# Patient Record
Sex: MALE | Race: ASIAN | URBAN - METROPOLITAN AREA
[De-identification: names, ages, dates, MRNs, and addresses within clinical notes are randomized per-mention and may not be internally consistent; named-entity substitution may affect disease eponyms.]

---

## 2022-09-09 ENCOUNTER — EMERGENCY (EMERGENCY)
Facility: HOSPITAL | Age: 42
LOS: 0 days | Discharge: HOME | End: 2022-09-09
Attending: STUDENT IN AN ORGANIZED HEALTH CARE EDUCATION/TRAINING PROGRAM

## 2022-09-09 VITALS
DIASTOLIC BLOOD PRESSURE: 86 MMHG | RESPIRATION RATE: 16 BRPM | SYSTOLIC BLOOD PRESSURE: 138 MMHG | HEART RATE: 76 BPM | OXYGEN SATURATION: 98 %

## 2022-09-09 VITALS
DIASTOLIC BLOOD PRESSURE: 106 MMHG | HEIGHT: 68 IN | TEMPERATURE: 98 F | HEART RATE: 85 BPM | OXYGEN SATURATION: 100 % | RESPIRATION RATE: 20 BRPM | SYSTOLIC BLOOD PRESSURE: 175 MMHG | WEIGHT: 210.1 LBS

## 2022-09-09 PROCEDURE — 99284 EMERGENCY DEPT VISIT MOD MDM: CPT

## 2022-09-09 NOTE — ED PROVIDER NOTE - OBJECTIVE STATEMENT
41 yo male w/ PMH of HTN, DM presents for overdose.  Snorted 3 g of ketamine, found by family member sitting on ground, no LOC.  Pt has no complaints. Denies trauma, SOB, chest pain, N/V. 43 yo male w/ PMH of HTN, DM presents for drug intoxication.  Snorted 3 g of ketamine, found by family member sitting on ground, no LOC.  Pt has no complaints. Denies trauma, SOB, chest pain, N/V.

## 2022-09-09 NOTE — ED ADULT TRIAGE NOTE - CHIEF COMPLAINT QUOTE
BIBA with complaints of overdose on Ketamine, snorted 3 gms, found by father awake but not really responding/moving. BIBA with complaints of overdose on Ketamine, snorted 3 gms bought from outside,  found by father awake but not really responding/moving. Denies SI

## 2022-09-09 NOTE — ED PROVIDER NOTE - NSFOLLOWUPCLINICS_GEN_ALL_ED_FT
[facility]  Detox Mgmt  [address] [place] [address]  Phone: [phone]  Fax:   Follow Up Time: 1-3 Days    [facility]  Medicine  [address], [place]   Phone: [phone]  Fax:   Follow Up Time: 1-3 Days

## 2022-09-09 NOTE — ED PROVIDER NOTE - ATTENDING CONTRIBUTION TO CARE
42-year-old male past medical history of diabetes presents to the emergency department for evaluation after using ketamine today.  Patient reports he used 3 g of ketamine at around 2 AM.  Patient was found by family member sitting on a chair less responsive than usual prompting ED visit.  No head trauma or LOC.  Patient currently drowsy but asymptomatic requesting detox at this time.    No fever, nausea, vomiting, chest pain, sob, palpitations, diaphoresis, cough, headache, abdominal pain, diarrhea, constipation, urinary symptoms, trauma, edema, calf pain or swelling, or rash.     Vital Signs: I have reviewed the initial vital signs.  Constitutional: Patient in no acute distress.  Integumentary: No rash.  EENT: MMM, white powder to nares bilaterally. +fatigable nystagmus   NECK: Supple.   Cardiovascular: RRR, radial pulses 2/4 bilaterally.   Respiratory: Breath sounds CTA b/l, no wheezing or crackles, good air exchange, good resp effort and excursion, no accessory muscle use, no stridor.  Gastrointestinal: Abdomen soft, non-tender, non-distended, no rebound tenderness or guarding.  Musculoskeletal: FROM, no edema, no calf pain/swelling/erythema.  Neurologic: AAOX3, moves all extremities, follows commands, cooperative. No FND.

## 2022-09-09 NOTE — ED ADULT TRIAGE NOTE - AS TEMP SITE
PMH :  CAD  No h/o HF  Handoff  MEWS Score  Aortic valve stenosis, unspecified etiology  Hyperlipidemia  Smoker  ETOH abuse  HTN (hypertension)  Aortic valve stenosis, unspecified etiology  Coronary artery disease with other form of angina pectoris  Aortic valve stenosis, unspecified etiology  Coronary artery disease with other form of angina pectoris  Ascites  Ileus  Constipation  Cirrhosis of liver not due to alcohol  ETOH abuse  CABG  AVR (aortic valve replacement)    ROS  All other systems reviewed and negative.    ICU Vital Signs Last 24 Hrs  T(C): 36.8, Max: 38.1 (06-09 @ 10:30)  T(F): 98.3, Max: 100.6 (06-09 @ 10:30)  HR: 89 (80 - 98)  BP: 97/44 (97/44 - 152/73)  BP(mean): 57 (57 - 109)  ABP: 158/66 (132/64 - 174/72)  ABP(mean): 102 (90 - 114)  RR: 16 (16 - 36)  SpO2: 100% (98% - 100%)    I&O's Summary  I & Os for 24h ending 09 Jun 2017 07:00  =============================================  IN: 2035 ml / OUT: 2830 ml / NET: -795 ml    I & Os for current day (as of 09 Jun 2017 19:53)  =============================================  IN: 700 ml / OUT: 1015 ml / NET: -315 ml      ABG - ( 09 Jun 2017 03:21 )  pH: 7.43  /  pCO2: 38    /  pO2: 144   / HCO3: 25    / Base Excess: 0.6   /  SaO2: 99                                      8.8    11.4  )-----------( 231      ( 09 Jun 2017 03:53 )             26.7     09 Jun 2017 03:53    141    |  104    |  18     ----------------------------<  97     4.0     |  23     |  0.60     Ca    9.0        09 Jun 2017 03:53  Phos  2.6       09 Jun 2017 03:53  Mg     2.0       09 Jun 2017 03:53    TPro  6.9    /  Alb  3.7    /  TBili  1.5    /  DBili  x      /  AST  50     /  ALT  25     /  AlkPhos  91     09 Jun 2017 03:53    PT/INR - ( 09 Jun 2017 03:38 )   PT: 20.4 sec;   INR: 1.82          PTT - ( 09 Jun 2017 03:38 )  PTT:33.4 sec  MEDICATIONS  (STANDING):  aspirin enteric coated 81milliGRAM(s) Oral daily  sodium chloride 0.45%. 1000milliLiter(s) IV Continuous <Continuous>  insulin lispro (HumaLOG) corrective regimen sliding scale  SubCutaneous every 6 hours  dextrose 5%. 1000milliLiter(s) IV Continuous <Continuous>  dextrose 50% Injectable 12.5Gram(s) IV Push once  dextrose 50% Injectable 25Gram(s) IV Push once  dextrose 50% Injectable 25Gram(s) IV Push once  multivitamin 1Tablet(s) Oral daily  atorvastatin 20milliGRAM(s) Oral at bedtime  heparin  Injectable 5000Unit(s) SubCutaneous every 12 hours  metoprolol 12.5milliGRAM(s) Oral every 6 hours  pantoprazole    Tablet 40milliGRAM(s) Oral before breakfast  folic acid 1milliGRAM(s) Oral daily  thiamine 100milliGRAM(s) Oral daily    PHYSICAL EXAM:  Gen : no acute distress  Neck: No LAD, No JVD  Respiratory: decreased in the bases  Cardiovascular: S1 and S2, RRR, no M/G/R  Gastrointestinal: BS+, soft, NT/ND  Extremities: No peripheral edema  Vascular: 2+ peripheral pulses  Neurological: A/O x 3, no focal deficits  Incision: clean dry/ no sign of infection  Lines: no sign of infection oral

## 2022-09-09 NOTE — ED ADULT NURSE NOTE - CHIEF COMPLAINT QUOTE
BIBA with complaints of overdose on Ketamine, snorted 3 gms bought from outside,  found by father awake but not really responding/moving. Denies SI

## 2022-09-09 NOTE — ED PROVIDER NOTE - PATIENT PORTAL LINK FT
You can access the FollowMyHealth Patient Portal offered by [facility] by registering at the following website: [email or web address]. By joining [email or web address]’s FollowMyHealth portal, you will also be able to view your health information using other applications (apps) compatible with our system.

## 2022-09-09 NOTE — ED PROVIDER NOTE - CLINICAL SUMMARY MEDICAL DECISION MAKING FREE TEXT BOX
42-year-old male presented with ketamine overdose.  Patient endorsed using 3 g of ketamine, found less responsive than baseline in a chair by family member.  No trauma.  Patient observed in the emergency department, on end-tidal CO2 monitoring, asymptomatic.  Patient ambulating without assistance.  Patient requesting to go to detox at this time, provided all information. Patient to be discharged from ED. Any available test results were discussed with patient and/or family. Verbal instructions given, including instructions to return to ED immediately for any new, worsening, or concerning symptoms. Patient endorsed understanding. Written discharge instructions additionally given, including follow-up plan. 42-year-old male presented s/p ketamine use. Patient endorsed using 3 g of ketamine, found less responsive than baseline in a chair by family member.  No trauma.  Patient observed in the emergency department, on end-tidal CO2 monitoring, asymptomatic.  Patient ambulating without assistance.  Patient requesting to go to detox at this time, provided all information. Patient to be discharged from ED. Any available test results were discussed with patient and/or family. Verbal instructions given, including instructions to return to ED immediately for any new, worsening, or concerning symptoms. Patient endorsed understanding. Written discharge instructions additionally given, including follow-up plan.

## 2022-09-09 NOTE — ED PROVIDER NOTE - CHIEF COMPLAINT
The patient is a 42y Male complaining of overdose. The patient is a 42y Male complaining of drug intoxication

## 2022-09-09 NOTE — ED ADULT NURSE NOTE - OBJECTIVE STATEMENT
Pt came s/p overdose on Ketamine, stated he snorted 3 gm that he bought outside on the street, pt has no complaints, ETCO2 is 39, RR-15, vitals are WDL, cooperative, denies trauma, SOB or chest pain.

## 2022-09-09 NOTE — ED PROVIDER NOTE - PHYSICAL EXAMINATION
GENERAL: NAD   SKIN: warm, dry  HEAD: Normocephalic; atraumatic.  EYES: PERRLA, EOMI  ENT: White powder in nares. Oropharynx WNL   CARD: S1, S2 normal; no murmurs, gallops, or rubs. Regular rate and rhythm.   RESP: LCTAB; No wheezes, rales, rhonchi, or stridor.  ABD: soft, nontender, and nondistended  NEURO: Alert, oriented. No focal deficits   PSYCH: Cooperative, appropriate. GENERAL: NAD   SKIN: warm, dry  HEAD: Normocephalic; atraumatic.  EYES: PERRLA  ENT: White powder in nares. Oropharynx WNL   CARD: S1, S2 normal; no murmurs, gallops, or rubs. Regular rate and rhythm.   RESP: LCTAB; No wheezes, rales, rhonchi, or stridor.  ABD: soft, nontender, and nondistended  NEURO: Alert, oriented. No focal deficits   PSYCH: Cooperative, appropriate.

## 2022-09-09 NOTE — ED PROVIDER NOTE - NS ED ROS FT
Constitutional: No fevers, chills, or malaise.  HEENT: No headache, visual changes   Cardiac:  No chest pain, SOB, leg edema, or leg pain.  Respiratory:  No cough, respiratory distress, or hemoptysis.  GI:  No nausea, vomiting, diarrhea, or abdominal pain.  :  No dysuria, frequency, or urgency.  MS:  No myalgia, muscle weakness, joint pain or back pain.  Neuro:  No dizziness, LOC, paralysis, or N/T.  Skin:  No skin rash.   Endocrine: No polyuria, polyphagia, or polydipsia.

## 2022-09-09 NOTE — ED PROVIDER NOTE - NSFOLLOWUPINSTRUCTIONS_ED_ALL_ED_FT
Substance Use Disorder  Substance use disorder happens when a person's repeated use of drugs or alcohol interferes with his or her ability to be productive. This disorder can cause problems with your mental and physical health. It can affect your ability to have healthy relationships, and it can keep you from being able to meet your responsibilities at work, home, or school. It can also lead to addiction.    The most commonly abused substances include:    Alcohol.  Tobacco.  Marijuana.  Stimulants, such as cocaine and methamphetamine.  Hallucinogens, such as LSD and PCP.  Opioids, such as some prescription pain medicines and heroin.    What are the causes?  This condition may develop due to social, psychological, or physical reasons. Causes include:    Stress.  Abuse.  Peer pressure.  Anxiety.  Depression.    What increases the risk?  This condition is more likely to develop in people who:    Are stressed.  Have been abused.  Have a mental health disorder, such as depression.  Are born with certain genes.    What are the signs or symptoms?  Symptoms of this condition include:    Using the substance for longer periods of time or at a higher dosage than what is normal or intended.  Having a lasting desire to use the substance.  Being unable to slow down or stop your use of the substance.  Spending an abnormal amount of time seeking the substance, using the substance, or recovering from using the substance.  Craving the substance.  Substance use that:    Interferes with your work, school, or home life.  Interferes with your personal and social relationships.  Makes you give up activities that you once enjoyed or found important.    Using the substance even though:    You know it is dangerous or bad for your health.  You know it is causing problems in your life.    Needing more and more of the substance to get the same effect (developing tolerance).  Experiencing physical symptoms if you do not use the substance (withdrawal).  Using the substance to avoid withdrawal.    How is this diagnosed?  This condition may be diagnosed based on:    Your history of substance use.  The way in which substance abuse affects your life.  Having at least two symptoms of substance use disorder within a 12-month period.    Your health care provider may also test your blood and urine for alcohol and drugs.    How is this treated?  ImageThis condition may be treated by:    Stopping substance use safely. This may require taking medicines and being closely observed for several days.  Taking part in group and individual counseling from mental health providers who help people with substance use disorder.  Staying at a residential treatment center for several days or weeks.  Attending daily counseling sessions at a treatment center.  Taking medicine as told by your health care provider:    To ease symptoms and prevent complications during withdrawal.  To treat other mental health issues, such as depression or anxiety.  To block cravings by causing the same effects as the substance.  To block the effects of the substance or replace good sensations with unpleasant ones.    Going to a support group to share your experience with others who are going through the same thing. These groups are an important part of long-term recovery for many people. They include 12-step programs like Alcoholics Anonymous and Narcotics Anonymous.    Recovery can be a long process. Many people who undergo treatment start using the substance again after stopping. This is called a relapse. If you have a relapse, that does not mean that treatment will not work.    Follow these instructions at home:  Take over-the-counter and prescription medicines only as told by your health care provider.  Do not use any drugs or alcohol.  Keep all follow-up visits as told by your health care provider. This is important. This includes continuing to work with therapists, health care providers, and support groups.  Contact a health care provider if:  You cannot take your medicines as told.  Your symptoms get worse.  You have trouble resisting the urge to use drugs or alcohol.  Get help right away if:  You have serious thoughts about hurting yourself or someone else.  You have a relapse.  This information is not intended to replace advice given to you by your health care provider. Make sure you discuss any questions you have with your health care provider.    Please check this website for help finding local Buprenorphine (Suboxone) providers or to find out if your PMD can prescribe Buprenorphine (Suboxone).  https://[email or web address]/medication-assisted-treatment/practitioner-program-data/treatment-practitioner-

## 2022-09-09 NOTE — ED PROVIDER NOTE - CARE PLAN
Assessment and plan of treatment:	plan - fs end tidal monitoring reassess   1 Principal Discharge DX:	Drug overdose  Assessment and plan of treatment:	plan - fs end tidal monitoring reassess   Principal Discharge DX:	Drug intoxication  Assessment and plan of treatment:	plan - fs end tidal monitoring reassess

## 2022-09-12 DIAGNOSIS — E11.9 TYPE 2 DIABETES MELLITUS WITHOUT COMPLICATIONS: ICD-10-CM

## 2022-09-12 DIAGNOSIS — I10 ESSENTIAL (PRIMARY) HYPERTENSION: ICD-10-CM

## 2022-09-12 DIAGNOSIS — T41.291A POISONING BY OTHER GENERAL ANESTHETICS, ACCIDENTAL (UNINTENTIONAL), INITIAL ENCOUNTER: ICD-10-CM

## 2022-09-12 DIAGNOSIS — Z86.19 PERSONAL HISTORY OF OTHER INFECTIOUS AND PARASITIC DISEASES: ICD-10-CM
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
## 2022-09-12 DIAGNOSIS — Z91.013 ALLERGY TO SEAFOOD: ICD-10-CM
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
## 2023-04-06 ENCOUNTER — EMERGENCY (EMERGENCY)
Facility: HOSPITAL | Age: 43
LOS: 0 days | Discharge: HOME | End: 2023-04-06
Attending: EMERGENCY MEDICINE
Payer: MEDICAID

## 2023-04-06 VITALS
HEART RATE: 70 BPM | DIASTOLIC BLOOD PRESSURE: 71 MMHG | SYSTOLIC BLOOD PRESSURE: 142 MMHG | WEIGHT: 210.1 LBS | TEMPERATURE: 99 F | OXYGEN SATURATION: 100 % | RESPIRATION RATE: 20 BRPM

## 2023-04-06 VITALS — HEIGHT: 68 IN

## 2023-04-06 DIAGNOSIS — Z91.013 ALLERGY TO SEAFOOD: ICD-10-CM

## 2023-04-06 DIAGNOSIS — I10 ESSENTIAL (PRIMARY) HYPERTENSION: ICD-10-CM

## 2023-04-06 DIAGNOSIS — F19.10 OTHER PSYCHOACTIVE SUBSTANCE ABUSE, UNCOMPLICATED: ICD-10-CM

## 2023-04-06 DIAGNOSIS — F17.210 NICOTINE DEPENDENCE, CIGARETTES, UNCOMPLICATED: ICD-10-CM

## 2023-04-06 DIAGNOSIS — R51.9 HEADACHE, UNSPECIFIED: ICD-10-CM

## 2023-04-06 DIAGNOSIS — Z86.19 PERSONAL HISTORY OF OTHER INFECTIOUS AND PARASITIC DISEASES: ICD-10-CM

## 2023-04-06 DIAGNOSIS — E11.9 TYPE 2 DIABETES MELLITUS WITHOUT COMPLICATIONS: ICD-10-CM

## 2023-04-06 PROBLEM — B19.10 UNSPECIFIED VIRAL HEPATITIS B WITHOUT HEPATIC COMA: Chronic | Status: ACTIVE | Noted: 2022-09-09

## 2023-04-06 LAB
ALBUMIN SERPL ELPH-MCNC: 4.2 G/DL — SIGNIFICANT CHANGE UP (ref 3.5–5.2)
ALP SERPL-CCNC: 136 U/L — HIGH (ref 30–115)
ALT FLD-CCNC: 69 U/L — HIGH (ref 0–41)
ANION GAP SERPL CALC-SCNC: 11 MMOL/L — SIGNIFICANT CHANGE UP (ref 7–14)
APTT BLD: 32.6 SEC — SIGNIFICANT CHANGE UP (ref 27–39.2)
AST SERPL-CCNC: 35 U/L — SIGNIFICANT CHANGE UP (ref 0–41)
BASOPHILS # BLD AUTO: 0.03 K/UL — SIGNIFICANT CHANGE UP (ref 0–0.2)
BASOPHILS NFR BLD AUTO: 0.3 % — SIGNIFICANT CHANGE UP (ref 0–1)
BILIRUB SERPL-MCNC: <0.2 MG/DL — SIGNIFICANT CHANGE UP (ref 0.2–1.2)
BUN SERPL-MCNC: 15 MG/DL — SIGNIFICANT CHANGE UP (ref 10–20)
CALCIUM SERPL-MCNC: 9.3 MG/DL — SIGNIFICANT CHANGE UP (ref 8.4–10.5)
CHLORIDE SERPL-SCNC: 100 MMOL/L — SIGNIFICANT CHANGE UP (ref 98–110)
CO2 SERPL-SCNC: 24 MMOL/L — SIGNIFICANT CHANGE UP (ref 17–32)
CREAT SERPL-MCNC: 0.7 MG/DL — SIGNIFICANT CHANGE UP (ref 0.7–1.5)
EGFR: 118 ML/MIN/1.73M2 — SIGNIFICANT CHANGE UP
EOSINOPHIL # BLD AUTO: 0.37 K/UL — SIGNIFICANT CHANGE UP (ref 0–0.7)
EOSINOPHIL NFR BLD AUTO: 3.8 % — SIGNIFICANT CHANGE UP (ref 0–8)
GLUCOSE SERPL-MCNC: 162 MG/DL — HIGH (ref 70–99)
HCT VFR BLD CALC: 38.2 % — LOW (ref 42–52)
HGB BLD-MCNC: 12.8 G/DL — LOW (ref 14–18)
IMM GRANULOCYTES NFR BLD AUTO: 0.5 % — HIGH (ref 0.1–0.3)
INR BLD: 0.96 RATIO — SIGNIFICANT CHANGE UP (ref 0.65–1.3)
LYMPHOCYTES # BLD AUTO: 2.1 K/UL — SIGNIFICANT CHANGE UP (ref 1.2–3.4)
LYMPHOCYTES # BLD AUTO: 21.4 % — SIGNIFICANT CHANGE UP (ref 20.5–51.1)
MCHC RBC-ENTMCNC: 31.9 PG — HIGH (ref 27–31)
MCHC RBC-ENTMCNC: 33.5 G/DL — SIGNIFICANT CHANGE UP (ref 32–37)
MCV RBC AUTO: 95.3 FL — HIGH (ref 80–94)
MONOCYTES # BLD AUTO: 0.83 K/UL — HIGH (ref 0.1–0.6)
MONOCYTES NFR BLD AUTO: 8.5 % — SIGNIFICANT CHANGE UP (ref 1.7–9.3)
NEUTROPHILS # BLD AUTO: 6.42 K/UL — SIGNIFICANT CHANGE UP (ref 1.4–6.5)
NEUTROPHILS NFR BLD AUTO: 65.5 % — SIGNIFICANT CHANGE UP (ref 42.2–75.2)
NRBC # BLD: 0 /100 WBCS — SIGNIFICANT CHANGE UP (ref 0–0)
PLATELET # BLD AUTO: 358 K/UL — SIGNIFICANT CHANGE UP (ref 130–400)
POTASSIUM SERPL-MCNC: 4.1 MMOL/L — SIGNIFICANT CHANGE UP (ref 3.5–5)
POTASSIUM SERPL-SCNC: 4.1 MMOL/L — SIGNIFICANT CHANGE UP (ref 3.5–5)
PROT SERPL-MCNC: 7.4 G/DL — SIGNIFICANT CHANGE UP (ref 6–8)
PROTHROM AB SERPL-ACNC: 10.9 SEC — SIGNIFICANT CHANGE UP (ref 9.95–12.87)
RBC # BLD: 4.01 M/UL — LOW (ref 4.7–6.1)
RBC # FLD: 12.1 % — SIGNIFICANT CHANGE UP (ref 11.5–14.5)
SODIUM SERPL-SCNC: 135 MMOL/L — SIGNIFICANT CHANGE UP (ref 135–146)
TROPONIN T SERPL-MCNC: <0.01 NG/ML — SIGNIFICANT CHANGE UP
WBC # BLD: 9.8 K/UL — SIGNIFICANT CHANGE UP (ref 4.8–10.8)
WBC # FLD AUTO: 9.8 K/UL — SIGNIFICANT CHANGE UP (ref 4.8–10.8)

## 2023-04-06 PROCEDURE — 36415 COLL VENOUS BLD VENIPUNCTURE: CPT

## 2023-04-06 PROCEDURE — 99285 EMERGENCY DEPT VISIT HI MDM: CPT | Mod: 25

## 2023-04-06 PROCEDURE — 85730 THROMBOPLASTIN TIME PARTIAL: CPT

## 2023-04-06 PROCEDURE — 99285 EMERGENCY DEPT VISIT HI MDM: CPT

## 2023-04-06 PROCEDURE — 80053 COMPREHEN METABOLIC PANEL: CPT

## 2023-04-06 PROCEDURE — 71046 X-RAY EXAM CHEST 2 VIEWS: CPT

## 2023-04-06 PROCEDURE — 93005 ELECTROCARDIOGRAM TRACING: CPT

## 2023-04-06 PROCEDURE — 93010 ELECTROCARDIOGRAM REPORT: CPT

## 2023-04-06 PROCEDURE — 71046 X-RAY EXAM CHEST 2 VIEWS: CPT | Mod: 26

## 2023-04-06 PROCEDURE — 70450 CT HEAD/BRAIN W/O DYE: CPT | Mod: 26,MA

## 2023-04-06 PROCEDURE — 84484 ASSAY OF TROPONIN QUANT: CPT

## 2023-04-06 PROCEDURE — 85025 COMPLETE CBC W/AUTO DIFF WBC: CPT

## 2023-04-06 PROCEDURE — 70450 CT HEAD/BRAIN W/O DYE: CPT | Mod: MA

## 2023-04-06 PROCEDURE — 85610 PROTHROMBIN TIME: CPT

## 2023-04-06 NOTE — SBIRT NOTE ADULT - NSSBIRTDRGBRIEFINTDET_GEN_A_CORE
Provided SBIRT services: Full screen positive. Referral to Treatment Performed. Screening results were reviewed with the patient and patient was provided information about healthy guidelines and potential negative consequences associated with level of risk. Motivation and readiness to reduce or stop use was discussed and goals and activities to make changes were suggested/offered.

## 2023-04-06 NOTE — SBIRT NOTE ADULT - NSSBIRTDRGACTIVEREFTXDET_GEN_A_CORE
Referral for complete assessment and level of care determination at a certified treatment facility was completed by contacting the treatment facility, [facility], [address] [place] [address], [phone]. Appt scheduled for 4/12/23 at 2:30 PM. Provided patient with referral information and he was in agreement with the plan. Patient provided with phone number for telephonic SBIRT ([phone]) for future follow up.

## 2023-04-06 NOTE — ED PROVIDER NOTE - PATIENT PORTAL LINK FT
You can access the FollowMyHealth Patient Portal offered by [facility] by registering at the following website: [email or web address]. By joining [employer]’s FollowMyHealth portal, you will also be able to view your health information using other applications (apps) compatible with our system.

## 2023-04-06 NOTE — ED PROVIDER NOTE - CLINICAL SUMMARY MEDICAL DECISION MAKING FREE TEXT BOX
VSS. Labs reassuring.  Neuro non focal.  Seen by SBIRT team.  Referral for complete assessment and level of care determination at a certified treatment facility was completed by contacting the treatment facility, [facility], [address] [place] [address], [phone]. Appt scheduled for 4/12/23 at 2:30 PM. Provided patient with referral information and he was in agreement with the plan.  DC home.

## 2023-04-06 NOTE — ED PROVIDER NOTE - NS ED ATTENDING STATEMENT MOD
This was a shared visit with the JONNATHAN. I reviewed and verified the documentation and independently performed the documented:

## 2023-04-06 NOTE — ED PROVIDER NOTE - NSFOLLOWUPINSTRUCTIONS_ED_ALL_ED_FT
Please follow up with your primary care physician within 24-72 hours and return immediately if symptoms worsen.    General Headache Without Cause  A headache is pain or discomfort felt around the head or neck area. The specific cause of a headache may not be found. There are many causes and types of headaches. A few common ones are:    Tension headaches.  Migraine headaches.  Cluster headaches.  Chronic daily headaches.    Follow these instructions at home:  Watch your condition for any changes. Take these steps to help with your condition:    Managing pain     Take over-the-counter and prescription medicines only as told by your health care provider.  Lie down in a dark, quiet room when you have a headache.  If directed, apply ice to the head and neck area:    Put ice in a plastic bag.  Place a towel between your skin and the bag.  Leave the ice on for 20 minutes, 2–3 times per day.    Use a heating pad or hot shower to apply heat to the head and neck area as told by your health care provider.  ImageKeep lights dim if bright lights bother you or make your headaches worse.  Eating and drinking     Eat meals on a regular schedule.  Limit alcohol use.  Decrease the amount of caffeine you drink, or stop drinking caffeine.  General instructions     Keep all follow-up visits as told by your health care provider. This is important.  Keep a headache journal to help find out what may trigger your headaches. For example, write down:    What you eat and drink.  How much sleep you get.  Any change to your diet or medicines.    Try massage or other relaxation techniques.  Limit stress.  Sit up straight, and do not tense your muscles.  Do not use tobacco products, including cigarettes, chewing tobacco, or e-cigarettes. If you need help quitting, ask your health care provider.  Exercise regularly as told by your health care provider.  ImageSleep on a regular schedule. Get 7–9 hours of sleep, or the amount recommended by your health care provider.  Contact a health care provider if:  Your symptoms are not helped by medicine.  You have a headache that is different from the usual headache.  You have nausea or you vomit.  You have a fever.  Get help right away if:  Your headache becomes severe.  You have repeated vomiting.  You have a stiff neck.  You have a loss of vision.  You have problems with speech.  You have pain in the eye or ear.  You have muscular weakness or loss of muscle control.  You lose your balance or have trouble walking.  You feel faint or pass out.  You have confusion.  This information is not intended to replace advice given to you by your health care provider. Make sure you discuss any questions you have with your health care provider.        Polysubstance Abuse    WHAT YOU NEED TO KNOW:    Polysubstance abuse is the abuse of 2 or more drugs that cause impairment or distress. Examples include alcohol, nicotine, marijuana, cocaine, heroin, methamphetamine, hallucinogens such as mushrooms, or inhalants such as paint thinner. Prescribed medicines, such as opioids for pain or benzodiazepines for anxiety, are also commonly abused.    DISCHARGE INSTRUCTIONS:    Call your local emergency number (911 in the ) if:    You feel you might harm yourself or others.    Return to the emergency department if:    You have a seizure.    You have chest pain and your heart is beating faster than usual.    You have new shortness of breath.    You are dizzy and lightheaded.  Call your doctor or therapist if:    You are using drugs and think you are pregnant.    You have withdrawal symptoms and want to start using drugs again.    You have questions or concerns about your condition or care.  Risks of polysubstance abuse:    Drug dependence is when you continue to use drugs, even when you know the risks. Polysubstance abuse can damage your heart, brain, lungs, liver, and gastrointestinal tract. You continue even when it causes problems with work, school, or relationships. You may have difficulty finding or keeping a job because of your drug dependence.    Drug tolerance is when you need to use more drugs, or use them more often, to get the effects you want. You may not be able to stop using the drugs. When you try to stop, you may have withdrawal symptoms and strong cravings for the drugs.    Drug overdose can occur when you take more drugs than your body can handle. This may be a small amount or a large amount. You can lose consciousness or have a seizure or stroke. Your heart can stop beating, or you can stop breathing. You may die from a drug overdose.  Medicines:    Withdrawal medicines may be given according to the types of drugs you are abusing. Withdrawal from drugs can cause serious, life-threatening side effects. Certain medicines can help decrease your withdrawal symptoms and your desire for the drug. Ask for more information about the withdrawal medicines you may need.    Mood stabilizers may be given to help prevent or treat depression or anxiety caused by drug abuse and withdrawal.    Take your medicine as directed. Contact your healthcare provider if you think your medicine is not helping or if you have side effects. Tell him or her if you are allergic to any medicine. Keep a list of the medicines, vitamins, and herbs you take. Include the amounts, and when and why you take them. Bring the list or the pill bottles to follow-up visits. Carry your medicine list with you in case of an emergency.  Follow up with your doctor or therapist as directed: You may be referred to a specialist to treat health conditions caused by your drug use. This includes mental health, heart, or lung specialists. Write down your questions so you remember to ask them during your visits.    Therapy: You may need therapy and support to stop using drugs:    Cognitive and behavioral therapy helps you change your thinking and behavior. It can help you develop plans to avoid the situations that make you want to use drugs. It also helps you cope with the feelings of wanting to use drugs. You may have individual or group therapy.    Contingency management helps you set drug-free goals with a therapist. You will decide ways to celebrate your success when you reach a goal.    Family therapy and support groups allow you and your family members to talk to and be encouraged by other people affected by drug abuse. You and your family members may attend together or separately. Ask your healthcare provider for information about programs in your area.  How polysubstance abuse affects unborn or  babies:    If you are pregnant or get pregnant while using drugs, you may have a miscarriage or give birth early. Your baby may be born addicted to the drugs.    Do not breastfeed your baby if you use drugs. Drugs pass from your bloodstream into your breast milk and affect your baby's health. Talk with your healthcare provider if you are using drugs and breastfeeding.

## 2023-04-06 NOTE — ED PROVIDER NOTE - NSFOLLOWUPCLINICS_GEN_ALL_ED_FT
[facility]  Neurology  [address] [place] [address], [facility] [address]  [place] [address]  Phone: [phone]  Fax:   Follow Up Time: 1-3 Days

## 2023-04-06 NOTE — ED PROVIDER NOTE - OBJECTIVE STATEMENT
41 yo male with a pmh of htn, dm, and hepB presents c/o intermittent headaches for the past month. pt states to have pulsatile pain to his temporal region with no radiation or notes aggravation/alleviating factors. 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 pt denies any other symptoms including fevers, chill, recent illness/travel, cough, abdominal pain, chest pain, or SOB.

## 2023-05-23 ENCOUNTER — EMERGENCY (EMERGENCY)
Facility: HOSPITAL | Age: 43
LOS: 0 days | Discharge: HOME | End: 2023-05-23
Attending: EMERGENCY MEDICINE
Payer: COMMERCIAL

## 2023-05-23 VITALS
RESPIRATION RATE: 16 BRPM | HEIGHT: 68 IN | OXYGEN SATURATION: 99 % | SYSTOLIC BLOOD PRESSURE: 141 MMHG | TEMPERATURE: 98 F | DIASTOLIC BLOOD PRESSURE: 88 MMHG | WEIGHT: 199.96 LBS | HEART RATE: 86 BPM

## 2023-05-23 VITALS
OXYGEN SATURATION: 98 % | TEMPERATURE: 98 F | HEART RATE: 74 BPM | RESPIRATION RATE: 17 BRPM | DIASTOLIC BLOOD PRESSURE: 65 MMHG | SYSTOLIC BLOOD PRESSURE: 125 MMHG

## 2023-05-23 DIAGNOSIS — Z91.148 PATIENT'S OTHER NONCOMPLIANCE WITH MEDICATION REGIMEN FOR OTHER REASON: ICD-10-CM

## 2023-05-23 DIAGNOSIS — R68.83 CHILLS (WITHOUT FEVER): ICD-10-CM

## 2023-05-23 DIAGNOSIS — I10 ESSENTIAL (PRIMARY) HYPERTENSION: ICD-10-CM

## 2023-05-23 DIAGNOSIS — E11.65 TYPE 2 DIABETES MELLITUS WITH HYPERGLYCEMIA: ICD-10-CM

## 2023-05-23 DIAGNOSIS — Z91.013 ALLERGY TO SEAFOOD: ICD-10-CM

## 2023-05-23 DIAGNOSIS — T38.3X6A UNDERDOSING OF INSULIN AND ORAL HYPOGLYCEMIC [ANTIDIABETIC] DRUGS, INITIAL ENCOUNTER: ICD-10-CM

## 2023-05-23 LAB
ALBUMIN SERPL ELPH-MCNC: 4 G/DL — SIGNIFICANT CHANGE UP (ref 3.5–5.2)
ALP SERPL-CCNC: 117 U/L — HIGH (ref 30–115)
ALT FLD-CCNC: 76 U/L — HIGH (ref 0–41)
ANION GAP SERPL CALC-SCNC: 9 MMOL/L — SIGNIFICANT CHANGE UP (ref 7–14)
APPEARANCE UR: CLEAR — SIGNIFICANT CHANGE UP
AST SERPL-CCNC: 67 U/L — HIGH (ref 0–41)
B-OH-BUTYR SERPL-SCNC: <0.2 MMOL/L — SIGNIFICANT CHANGE UP
BACTERIA # UR AUTO: NEGATIVE — SIGNIFICANT CHANGE UP
BASE EXCESS BLDV CALC-SCNC: 2.5 MMOL/L — SIGNIFICANT CHANGE UP (ref -2–3)
BASOPHILS # BLD AUTO: 0.04 K/UL — SIGNIFICANT CHANGE UP (ref 0–0.2)
BASOPHILS NFR BLD AUTO: 0.5 % — SIGNIFICANT CHANGE UP (ref 0–1)
BILIRUB SERPL-MCNC: <0.2 MG/DL — SIGNIFICANT CHANGE UP (ref 0.2–1.2)
BILIRUB UR-MCNC: NEGATIVE — SIGNIFICANT CHANGE UP
BUN SERPL-MCNC: 15 MG/DL — SIGNIFICANT CHANGE UP (ref 10–20)
CA-I SERPL-SCNC: 1.2 MMOL/L — SIGNIFICANT CHANGE UP (ref 1.15–1.33)
CALCIUM SERPL-MCNC: 9.3 MG/DL — SIGNIFICANT CHANGE UP (ref 8.4–10.5)
CHLORIDE SERPL-SCNC: 102 MMOL/L — SIGNIFICANT CHANGE UP (ref 98–110)
CO2 SERPL-SCNC: 24 MMOL/L — SIGNIFICANT CHANGE UP (ref 17–32)
COLOR SPEC: SIGNIFICANT CHANGE UP
CREAT SERPL-MCNC: 0.7 MG/DL — SIGNIFICANT CHANGE UP (ref 0.7–1.5)
DIFF PNL FLD: NEGATIVE — SIGNIFICANT CHANGE UP
EGFR: 117 ML/MIN/1.73M2 — SIGNIFICANT CHANGE UP
EOSINOPHIL # BLD AUTO: 0.29 K/UL — SIGNIFICANT CHANGE UP (ref 0–0.7)
EOSINOPHIL NFR BLD AUTO: 3.8 % — SIGNIFICANT CHANGE UP (ref 0–8)
EPI CELLS # UR: 1 /HPF — SIGNIFICANT CHANGE UP (ref 0–5)
GAS PNL BLDV: 134 MMOL/L — LOW (ref 136–145)
GAS PNL BLDV: SIGNIFICANT CHANGE UP
GAS PNL BLDV: SIGNIFICANT CHANGE UP
GLUCOSE SERPL-MCNC: 152 MG/DL — HIGH (ref 70–99)
GLUCOSE UR QL: ABNORMAL
HCO3 BLDV-SCNC: 28 MMOL/L — SIGNIFICANT CHANGE UP (ref 22–29)
HCT VFR BLD CALC: 40.2 % — LOW (ref 42–52)
HCT VFR BLDA CALC: 41 % — SIGNIFICANT CHANGE UP (ref 39–51)
HGB BLD CALC-MCNC: 13.8 G/DL — SIGNIFICANT CHANGE UP (ref 12.6–17.4)
HGB BLD-MCNC: 13.3 G/DL — LOW (ref 14–18)
HYALINE CASTS # UR AUTO: 1 /LPF — SIGNIFICANT CHANGE UP (ref 0–7)
IMM GRANULOCYTES NFR BLD AUTO: 0.5 % — HIGH (ref 0.1–0.3)
KETONES UR-MCNC: NEGATIVE — SIGNIFICANT CHANGE UP
LACTATE BLDV-MCNC: 1.3 MMOL/L — SIGNIFICANT CHANGE UP (ref 0.5–2)
LEUKOCYTE ESTERASE UR-ACNC: NEGATIVE — SIGNIFICANT CHANGE UP
LYMPHOCYTES # BLD AUTO: 1.95 K/UL — SIGNIFICANT CHANGE UP (ref 1.2–3.4)
LYMPHOCYTES # BLD AUTO: 25.8 % — SIGNIFICANT CHANGE UP (ref 20.5–51.1)
MCHC RBC-ENTMCNC: 30.9 PG — SIGNIFICANT CHANGE UP (ref 27–31)
MCHC RBC-ENTMCNC: 33.1 G/DL — SIGNIFICANT CHANGE UP (ref 32–37)
MCV RBC AUTO: 93.3 FL — SIGNIFICANT CHANGE UP (ref 80–94)
MONOCYTES # BLD AUTO: 0.68 K/UL — HIGH (ref 0.1–0.6)
MONOCYTES NFR BLD AUTO: 9 % — SIGNIFICANT CHANGE UP (ref 1.7–9.3)
NEUTROPHILS # BLD AUTO: 4.55 K/UL — SIGNIFICANT CHANGE UP (ref 1.4–6.5)
NEUTROPHILS NFR BLD AUTO: 60.4 % — SIGNIFICANT CHANGE UP (ref 42.2–75.2)
NITRITE UR-MCNC: NEGATIVE — SIGNIFICANT CHANGE UP
NRBC # BLD: 0 /100 WBCS — SIGNIFICANT CHANGE UP (ref 0–0)
PCO2 BLDV: 45 MMHG — SIGNIFICANT CHANGE UP (ref 42–55)
PH BLDV: 7.4 — SIGNIFICANT CHANGE UP (ref 7.32–7.43)
PH UR: 6 — SIGNIFICANT CHANGE UP (ref 5–8)
PLATELET # BLD AUTO: 299 K/UL — SIGNIFICANT CHANGE UP (ref 130–400)
PMV BLD: 9.4 FL — SIGNIFICANT CHANGE UP (ref 7.4–10.4)
PO2 BLDV: 52 MMHG — SIGNIFICANT CHANGE UP
POTASSIUM BLDV-SCNC: 5.4 MMOL/L — HIGH (ref 3.5–5.1)
POTASSIUM SERPL-MCNC: 6.6 MMOL/L — CRITICAL HIGH (ref 3.5–5)
POTASSIUM SERPL-SCNC: 6.6 MMOL/L — CRITICAL HIGH (ref 3.5–5)
PROT SERPL-MCNC: 7.3 G/DL — SIGNIFICANT CHANGE UP (ref 6–8)
PROT UR-MCNC: ABNORMAL
RBC # BLD: 4.31 M/UL — LOW (ref 4.7–6.1)
RBC # FLD: 11.8 % — SIGNIFICANT CHANGE UP (ref 11.5–14.5)
RBC CASTS # UR COMP ASSIST: 0 /HPF — SIGNIFICANT CHANGE UP (ref 0–4)
SAO2 % BLDV: 83.7 % — SIGNIFICANT CHANGE UP
SODIUM SERPL-SCNC: 135 MMOL/L — SIGNIFICANT CHANGE UP (ref 135–146)
SP GR SPEC: 1.02 — SIGNIFICANT CHANGE UP (ref 1.01–1.03)
UROBILINOGEN FLD QL: SIGNIFICANT CHANGE UP
WBC # BLD: 7.55 K/UL — SIGNIFICANT CHANGE UP (ref 4.8–10.8)
WBC # FLD AUTO: 7.55 K/UL — SIGNIFICANT CHANGE UP (ref 4.8–10.8)
WBC UR QL: 1 /HPF — SIGNIFICANT CHANGE UP (ref 0–5)

## 2023-05-23 PROCEDURE — 82010 KETONE BODYS QUAN: CPT

## 2023-05-23 PROCEDURE — 84132 ASSAY OF SERUM POTASSIUM: CPT

## 2023-05-23 PROCEDURE — 99283 EMERGENCY DEPT VISIT LOW MDM: CPT

## 2023-05-23 PROCEDURE — 83605 ASSAY OF LACTIC ACID: CPT

## 2023-05-23 PROCEDURE — 82330 ASSAY OF CALCIUM: CPT

## 2023-05-23 PROCEDURE — 36415 COLL VENOUS BLD VENIPUNCTURE: CPT

## 2023-05-23 PROCEDURE — 85025 COMPLETE CBC W/AUTO DIFF WBC: CPT

## 2023-05-23 PROCEDURE — 99284 EMERGENCY DEPT VISIT MOD MDM: CPT

## 2023-05-23 PROCEDURE — 80053 COMPREHEN METABOLIC PANEL: CPT

## 2023-05-23 PROCEDURE — 81001 URINALYSIS AUTO W/SCOPE: CPT

## 2023-05-23 PROCEDURE — 84295 ASSAY OF SERUM SODIUM: CPT

## 2023-05-23 PROCEDURE — 82803 BLOOD GASES ANY COMBINATION: CPT

## 2023-05-23 PROCEDURE — 85018 HEMOGLOBIN: CPT

## 2023-05-23 PROCEDURE — 82962 GLUCOSE BLOOD TEST: CPT

## 2023-05-23 PROCEDURE — 85014 HEMATOCRIT: CPT

## 2023-05-23 RX ORDER — SODIUM CHLORIDE 9 MG/ML
1000 INJECTION INTRAMUSCULAR; INTRAVENOUS; SUBCUTANEOUS ONCE
Refills: 0 | Status: COMPLETED | OUTPATIENT
Start: 2023-05-23 | End: 2023-05-23

## 2023-05-23 RX ORDER — METFORMIN HYDROCHLORIDE 850 MG/1
1 TABLET ORAL
Qty: 14 | Refills: 1
Start: 2023-05-23 | End: 2023-06-05

## 2023-05-23 RX ADMIN — SODIUM CHLORIDE 2000 MILLILITER(S): 9 INJECTION INTRAMUSCULAR; INTRAVENOUS; SUBCUTANEOUS at 15:44

## 2023-05-23 NOTE — ED ADULT NURSE NOTE - NSFALLUNIVINTERV_ED_ALL_ED
Bed/Stretcher in lowest position, wheels locked, appropriate side rails in place/Call bell, personal items and telephone in reach/Instruct patient to call for assistance before getting out of bed/chair/stretcher/Non-slip footwear applied when patient is off stretcher/[place] to call system/Physically safe environment - no spills, clutter or unnecessary equipment/Purposeful proactive rounding/Room/bathroom lighting operational, light cord in reach

## 2023-05-23 NOTE — ED PROVIDER NOTE - PATIENT PORTAL LINK FT
You can access the FollowMyHealth Patient Portal offered by [facility] by registering at the following website: [email or web address]. By joining [employer]’s FollowMyHealth portal, you will also be able to view your health information using other applications (apps) compatible with our system.

## 2023-05-23 NOTE — ED PROVIDER NOTE - ATTENDING CONTRIBUTION TO CARE
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  Patient reports feeling shaky, says he has not been taking his usual metformin and Lantus. On exam he is drowsy but arousable to a normal mental status, able to have a conversation. VS as noted, normotensive, not tachycardic. PERRL, neck supple, lungs clear, CV S1-S2, abdomen soft, nontender.  Neuro grossly intact.  Patient does not appear to be withdrawing, but he is hyperglycemic.  Will check labs to rule out DKA, hydrate, reassess.

## 2023-05-23 NOTE — ED PROVIDER NOTE - NSFOLLOWUPINSTRUCTIONS_ED_ALL_ED_FT
Please follow-up with PCP in 1 to 3 days. Return precautions explained in full to patient/family.    Please take metformin as prescribed.    D/C Planning:  -Discussed with patient the importance of Carb consistent diet and exercise as tolerated.   -Recommend nutritional consultation  inpt prior to dc  -Discussed glycemic goal of a1c <7% to prevent microvascular complications of diabetes mellitus and reduce the risk of macrovascular complications.  - Make sure patient knows how to inject insulin and check fingersticks with glucometer (ask bedside RN for teaching)  - Discharge on premeal insulin and Lantus. do not dc on correction scale or bedtime scale.  - At home, Patient should check FSBG premeals and bedtime. Pt should call their doctor when FSBG <70 or above >400 and or consistently above 200s as changes in the regimen will have to be made.  -pt should call PMD for DM related questions or concerns until pt is seen by CDE or endocrine  -Recommend annual podiatry and ophthalmology follow up.     -------------------------------------------------------------------------------------------------------------------------------------  DISCHARGE RX:  -Please send Lantus Solostar Pen ___ units SQ qHS as well as  Humalog Kwikpen ___units SQ TID before meals to pharmacy as test script to check insurance coverage. Ok to send with current doses and update prior to d/c  If not covered can use alternative in same class. For basal, alternatives are Basaglar,Toujeo, Tresiba.   For bolus, alternatives are Novolog, Admelog or Apidra.  -Patient will also need glucometer per insurance, test strips, lancets, alcohol pads, and BD ABY PEN NEEDLES 4 x daily    - Glucometer (ACCU_CHECK ken Conenct, Ascensia Contour Next EZ or One, Freestyle Freedome LITE or OneTouch Verio IQ)  - Glucometer test strips and lancets  (make sure compatible w glucometer), Dispense #100 (or #200) use as directed  -  BD aby 4mm pen needles 4x daily     Humalog Flexpen up to ___ dispense 15ml- can try alternating with one of following  if not covered try alternative: novolog/apidra/admelog/fiasp

## 2023-05-23 NOTE — ED PROVIDER NOTE - NSFOLLOWUPCLINICS_GEN_ALL_ED_FT
[facility]  Medicine  [address], [place]   Phone: [phone]  Fax:   Follow Up Time: 7-10 Days

## 2023-05-23 NOTE — ED ADULT TRIAGE NOTE - NSSEPSISSUSPECTED_ED_A_ED
No
Pt to maintain % po intake of meals and snacks over the next 7 days. RD to monitor diet order, energy intake, body composition, glucose profile, NFPF

## 2023-05-23 NOTE — ED PROVIDER NOTE - CLINICAL SUMMARY MEDICAL DECISION MAKING FREE TEXT BOX
Pt mildly hyperglycemic but no anion gap and not acidotic on VBG. Remained comfortable throughout ED stay. Spoke with staff at [address] [place] [address], will restart metformin, hold Lantus for now until followup established. Pt to be transferred back to [address] [place] [address]. Return precautions discussed.

## 2023-05-23 NOTE — ED PROVIDER NOTE - PHYSICAL EXAMINATION
Gen: Alert, NAD, well appearing  Head: NC, AT, EOMI, normal lids/conjunctiva,    Pulm: Bilateral BS, normal resp effort, no wheeze/stridor/retractions  CV: RRR, no M/R/G, +dist pulses  Abd: soft, NT/ND, no hepatosplenomegaly  Skin: no rash, warm/dry  Neuro: AAOx3, no sensory/motor deficits, CN 2-12 grossly intact

## 2023-05-23 NOTE — ED PROVIDER NOTE - OBJECTIVE STATEMENT
43-year-old male past medical history of DM, HTN, appetite is be, as well as drug use disorder brought in by ambulance from [address] [place] [address] with high blood glucose.  At triage fingerstick is 262.  Patient reports that he was heading to detox for ketamine abuse, last snorted 2 to 3 g yesterday, where they noted his high sugars and sent him here.  Patient denies any associated symptoms, does note some mild chills and shakes which she usually gets after not taking ketamine.  Also notes that he has taken metformin and Lantus insulin in the past, stopped taking it due to drug habit. Denies any fever, chills, nausea, vomiting, CP, SOB, changes in urination, or changes in bowel movements.

## 2023-10-19 ENCOUNTER — EMERGENCY (EMERGENCY)
Facility: HOSPITAL | Age: 43
LOS: 0 days | Discharge: HOME | End: 2023-10-19
Attending: EMERGENCY MEDICINE
Payer: MEDICAID

## 2023-10-19 VITALS
RESPIRATION RATE: 16 BRPM | TEMPERATURE: 98 F | HEART RATE: 88 BPM | DIASTOLIC BLOOD PRESSURE: 86 MMHG | WEIGHT: 184.97 LBS | SYSTOLIC BLOOD PRESSURE: 133 MMHG | OXYGEN SATURATION: 99 %

## 2023-10-19 DIAGNOSIS — R79.89 OTHER SPECIFIED ABNORMAL FINDINGS OF BLOOD CHEMISTRY: ICD-10-CM

## 2023-10-19 DIAGNOSIS — Z91.013 ALLERGY TO SEAFOOD: ICD-10-CM

## 2023-10-19 DIAGNOSIS — I10 ESSENTIAL (PRIMARY) HYPERTENSION: ICD-10-CM

## 2023-10-19 DIAGNOSIS — Z79.84 LONG TERM (CURRENT) USE OF ORAL HYPOGLYCEMIC DRUGS: ICD-10-CM

## 2023-10-19 DIAGNOSIS — E11.9 TYPE 2 DIABETES MELLITUS WITHOUT COMPLICATIONS: ICD-10-CM

## 2023-10-19 DIAGNOSIS — Z86.19 PERSONAL HISTORY OF OTHER INFECTIOUS AND PARASITIC DISEASES: ICD-10-CM

## 2023-10-19 DIAGNOSIS — Z86.59 PERSONAL HISTORY OF OTHER MENTAL AND BEHAVIORAL DISORDERS: ICD-10-CM

## 2023-10-19 PROCEDURE — 99282 EMERGENCY DEPT VISIT SF MDM: CPT

## 2023-10-19 PROCEDURE — 99284 EMERGENCY DEPT VISIT MOD MDM: CPT

## 2023-10-19 NOTE — ED PROVIDER NOTE - PATIENT PORTAL LINK FT
You can access the FollowMyHealth Patient Portal offered by [facility] by registering at the following website: [email or web address]. By joining [employer]’s FollowMyHealth portal, you will also be able to view your health information using other applications (apps) compatible with our system.

## 2023-10-19 NOTE — ED PROVIDER NOTE - ATTENDING CONTRIBUTION TO CARE
43 y.o. M, PMH of ketamine use disorder for 7 years, Hep B, DM, HTN, anxiety, depression, BIBA from [place] addiction treatment center for elevated GGT levels. Patient last used ketamine 3-4 days ago, decided he wanted treatment for his addiction, checked himself into [facility] He did blood work yesterday, results came back, has a GGT level of 1830. Otherwise patient denies any acute symptoms. Denies fevers, chills, N/V, hematuria, bloody stools, abd pain, chest pain. On exam, pt in NAD, AAOx3, head NC/AT, CN II-XII intact, PEERL, EOMi, neck (-) midline tenderness, lungs CTA B/L, CV S1S2 regular, abdomen soft/NT/ND/(+)BS, ext (-) edema, motor 5/5x4, sensation intact, ambulating with steady gait. Pt's labs reviewed. No need to work pt up emergently. Will d/c with PMD follow up.

## 2023-10-19 NOTE — ED PROVIDER NOTE - CLINICAL SUMMARY MEDICAL DECISION MAKING FREE TEXT BOX
43 y.o. M, PMH of ketamine use disorder for 7 years, Hep B, DM, HTN, anxiety, depression, BIBA from [place] addiction treatment center for elevated GGT levels. Patient last used ketamine 3-4 days ago, decided he wanted treatment for his addiction, checked himself into [facility]. He did blood work yesterday, results came back, has a GGT level of 1830. Otherwise patient denies any acute symptoms. Denies fevers, chills, N/V, hematuria, bloody stools, abd pain, chest pain. On exam, pt in NAD, AAOx3, head NC/AT, CN II-XII intact, PEERL, EOMi, neck (-) midline tenderness, lungs CTA B/L, CV S1S2 regular, abdomen soft/NT/ND/(+)BS, ext (-) edema, motor 5/5x4, sensation intact, ambulating with steady gait. Pt's labs reviewed. No need to work pt up emergently. Will d/c with PMD follow up.

## 2023-10-19 NOTE — ED PROVIDER NOTE - PROGRESS NOTE DETAILS
BT: Attempted to reach out to [facility] in [place] via two phone numbers provided multiple times, no response. Pt denies any acute symptoms. Will dc

## 2023-10-19 NOTE — ED PROVIDER NOTE - TEST CONSIDERED BUT NOT PERFORMED
labs, RUQ US but work up is not emergent. Will d/c with PMD follow up. Tests Considered But Not Performed

## 2023-10-19 NOTE — ED ADULT NURSE NOTE - NSFALLRISKINTERV_ED_ALL_ED

## 2023-10-19 NOTE — ED PROVIDER NOTE - PHYSICAL EXAMINATION
GENERAL: Well-developed; well-nourished; in no acute distress.  SKIN: warm, well perfused  HEAD: Normocephalic; atraumatic.  EYES: PERRLA, no conjunctival erythema, ocular motions intact and appropriate, no nystagmus  ENT: No nasal discharge; airway clear.  CARD: S1, S2 normal; no murmurs, gallops, or rubs. Regular rate and rhythm.   RESP: LCTAB; No wheezes, rales, rhonchi, or stridor.  ABD: soft, nontender, and nondistended.  Upper EXT: Normal ROM. Rad pulses 2+ symm, cap refill <2 secs, sensation intact and symm,  strength 5/5  Lower EXT: no edema, strength 5/5  PSYCH: Cooperative, appropriate.

## 2023-10-19 NOTE — ED PROVIDER NOTE - OBJECTIVE STATEMENT
Patient is a 42 y/o M, pmhx of ketamine use disorder for 7 years, Hep B, DM, HTN, anxiety, depression, BIBA from [place] addiction treatment center for elevated GGT levels. Patient last used ketamine 3-4 days ago, decided he wanted treatment for his addiction, checked himself into [facility]. He did blood work yesterday, results came back, has a GGT level of 1830. Otherwise patient denies any acute symptoms. Denies fevers, chills, N/V, hematuria, bloody stools, abd pain, chest pain.

## 2024-07-23 NOTE — ED ADULT NURSE NOTE - NS_SISCREENINGSR_GEN_ALL_ED
"HPI:  Dante Crawford is a 24-year-old male who presents today with a few year history of bilateral lumbar radicular pain that occurs once or twice a month.  He states once or twice a month is \"legs turn to Jelly\"and he has pain that extends from the buttocks to slightly below the knee.  This seems to resolve after a few days at a time.  He has numbness and tingling in the same distribution and it feels like \"I am walking on electricity \".  He just started physical therapy yesterday.  He has tried over-the-counter pain medications which do not help.  He denies any neurologic deficit.  No other questions or concerns at time of visit.    ROS:  Reviewed on EMR and patient intake sheet.    PMH/SH:  Reviewed on EMR and patient intake sheet.    Exam:  MSK: Full strength range of motion of lower extremities bilaterally.  Negative straight leg raise bilaterally.  General: No acute distress. Awake and conversant.  Eyes: Normal conjunctiva, anicteric. Round symmetric pupils.  ENT: Hearing grossly intact. No nasal discharge.  Neck: Neck is supple. No masses or thyromegaly.  Respiratory: Respirations are non-labored. No wheezing.  Skin: Warm. No rashes or ulcers.  Psych: Alert and oriented. Cooperative, appropriate mood and affect, normal judgement.  CV: No lower extremity edema.  Neuro: Sensation and CN II-XII grossly normal.    Radiology:     X-rays personally reviewed and demonstrate normal alignment lumbar spine.  No acute fractures or dislocations.  Disc height well-maintained.    Diagnosis:    Lumbar radiculopathy    Assessment and Plan:  Patient was seen today for discussion of lumbar radicular symptoms that have not improved on their own. We discussed conservative management and the patient should continue PT with core strengthening today. We discussed pharmacologic management of Gabapentin. The patient was recommended to follow up in 4-6 weeks after completion of PT or if their symptoms suddenly worsen. I recommended OTC " Negative Tylenol/NSAIDs for pain relief. The next step would be an MRI to further investigate lumbar spine. At that point, we will also discuss pain management versus surgical options. Patient agrees to the plan above.     David Fisher PA-C  Department of Orthopaedic Surgery  9:39 AM  07/23/24    [address] [place] [address]    Voicemail: [phone]   Appts: [phone]  Fax: [phone]

## 2024-11-18 NOTE — ED ADULT TRIAGE NOTE - DOMESTIC TRAVEL HIGH RISK QUESTION
Follow up admission CBC/CMP and p:c ratio unable to calculate  BP monitoring  Systolic (12hrs), Av , Min:111 , Max:160   Diastolic (12hrs), Av, Min:58, Max:82        No

## 2024-12-26 ENCOUNTER — INPATIENT (INPATIENT)
Facility: HOSPITAL | Age: 44
LOS: 20 days | Discharge: HOME | DRG: 190 | End: 2025-01-16
Attending: INTERNAL MEDICINE | Admitting: INTERNAL MEDICINE
Payer: MEDICAID

## 2024-12-26 VITALS
DIASTOLIC BLOOD PRESSURE: 98 MMHG | HEART RATE: 110 BPM | TEMPERATURE: 99 F | RESPIRATION RATE: 26 BRPM | HEIGHT: 68 IN | WEIGHT: 210.1 LBS | OXYGEN SATURATION: 92 % | SYSTOLIC BLOOD PRESSURE: 138 MMHG

## 2024-12-26 DIAGNOSIS — I24.9 ACUTE ISCHEMIC HEART DISEASE, UNSPECIFIED: ICD-10-CM

## 2024-12-26 LAB
ALBUMIN SERPL ELPH-MCNC: 2.8 G/DL — LOW (ref 3.5–5.2)
ALP SERPL-CCNC: 157 U/L — HIGH (ref 30–115)
ALT FLD-CCNC: 30 U/L — SIGNIFICANT CHANGE UP (ref 0–41)
ANION GAP SERPL CALC-SCNC: 11 MMOL/L — SIGNIFICANT CHANGE UP (ref 7–14)
AST SERPL-CCNC: 37 U/L — SIGNIFICANT CHANGE UP (ref 0–41)
BASOPHILS # BLD AUTO: 0.04 K/UL — SIGNIFICANT CHANGE UP (ref 0–0.2)
BASOPHILS NFR BLD AUTO: 0.3 % — SIGNIFICANT CHANGE UP (ref 0–1)
BILIRUB SERPL-MCNC: 0.2 MG/DL — SIGNIFICANT CHANGE UP (ref 0.2–1.2)
BUN SERPL-MCNC: 12 MG/DL — SIGNIFICANT CHANGE UP (ref 10–20)
CALCIUM SERPL-MCNC: 8.2 MG/DL — LOW (ref 8.4–10.5)
CHLORIDE SERPL-SCNC: 98 MMOL/L — SIGNIFICANT CHANGE UP (ref 98–110)
CO2 SERPL-SCNC: 25 MMOL/L — SIGNIFICANT CHANGE UP (ref 17–32)
CREAT SERPL-MCNC: 0.6 MG/DL — LOW (ref 0.7–1.5)
CRP SERPL-MCNC: 91 MG/L — HIGH
D DIMER BLD IA.RAPID-MCNC: 543 NG/ML DDU — HIGH
EGFR: 122 ML/MIN/1.73M2 — SIGNIFICANT CHANGE UP
EOSINOPHIL # BLD AUTO: 0.21 K/UL — SIGNIFICANT CHANGE UP (ref 0–0.7)
EOSINOPHIL NFR BLD AUTO: 1.8 % — SIGNIFICANT CHANGE UP (ref 0–8)
FLUAV AG NPH QL: SIGNIFICANT CHANGE UP
FLUBV AG NPH QL: SIGNIFICANT CHANGE UP
GAS PNL BLDV: SIGNIFICANT CHANGE UP
GLUCOSE SERPL-MCNC: 334 MG/DL — HIGH (ref 70–99)
HCT VFR BLD CALC: 38.1 % — LOW (ref 42–52)
HGB BLD-MCNC: 12.9 G/DL — LOW (ref 14–18)
IMM GRANULOCYTES NFR BLD AUTO: 0.8 % — HIGH (ref 0.1–0.3)
LYMPHOCYTES # BLD AUTO: 1.9 K/UL — SIGNIFICANT CHANGE UP (ref 1.2–3.4)
LYMPHOCYTES # BLD AUTO: 16.1 % — LOW (ref 20.5–51.1)
MCHC RBC-ENTMCNC: 31.5 PG — HIGH (ref 27–31)
MCHC RBC-ENTMCNC: 33.9 G/DL — SIGNIFICANT CHANGE UP (ref 32–37)
MCV RBC AUTO: 93.2 FL — SIGNIFICANT CHANGE UP (ref 80–94)
MONOCYTES # BLD AUTO: 0.77 K/UL — HIGH (ref 0.1–0.6)
MONOCYTES NFR BLD AUTO: 6.5 % — SIGNIFICANT CHANGE UP (ref 1.7–9.3)
NEUTROPHILS # BLD AUTO: 8.76 K/UL — HIGH (ref 1.4–6.5)
NEUTROPHILS NFR BLD AUTO: 74.5 % — SIGNIFICANT CHANGE UP (ref 42.2–75.2)
NRBC # BLD: 0 /100 WBCS — SIGNIFICANT CHANGE UP (ref 0–0)
NT-PROBNP SERPL-SCNC: 4126 PG/ML — HIGH (ref 0–300)
PLATELET # BLD AUTO: 560 K/UL — HIGH (ref 130–400)
PMV BLD: 9.3 FL — SIGNIFICANT CHANGE UP (ref 7.4–10.4)
POTASSIUM SERPL-MCNC: 3.9 MMOL/L — SIGNIFICANT CHANGE UP (ref 3.5–5)
POTASSIUM SERPL-SCNC: 3.9 MMOL/L — SIGNIFICANT CHANGE UP (ref 3.5–5)
PROT SERPL-MCNC: 6.3 G/DL — SIGNIFICANT CHANGE UP (ref 6–8)
RBC # BLD: 4.09 M/UL — LOW (ref 4.7–6.1)
RBC # FLD: 12.4 % — SIGNIFICANT CHANGE UP (ref 11.5–14.5)
RSV RNA NPH QL NAA+NON-PROBE: SIGNIFICANT CHANGE UP
SARS-COV-2 RNA SPEC QL NAA+PROBE: DETECTED
SODIUM SERPL-SCNC: 134 MMOL/L — LOW (ref 135–146)
WBC # BLD: 11.77 K/UL — HIGH (ref 4.8–10.8)
WBC # FLD AUTO: 11.77 K/UL — HIGH (ref 4.8–10.8)

## 2024-12-26 PROCEDURE — 85014 HEMATOCRIT: CPT

## 2024-12-26 PROCEDURE — 83615 LACTATE (LD) (LDH) ENZYME: CPT

## 2024-12-26 PROCEDURE — 71045 X-RAY EXAM CHEST 1 VIEW: CPT

## 2024-12-26 PROCEDURE — 76705 ECHO EXAM OF ABDOMEN: CPT

## 2024-12-26 PROCEDURE — 94640 AIRWAY INHALATION TREATMENT: CPT

## 2024-12-26 PROCEDURE — 86140 C-REACTIVE PROTEIN: CPT

## 2024-12-26 PROCEDURE — 94660 CPAP INITIATION&MGMT: CPT

## 2024-12-26 PROCEDURE — 82330 ASSAY OF CALCIUM: CPT

## 2024-12-26 PROCEDURE — 82803 BLOOD GASES ANY COMBINATION: CPT

## 2024-12-26 PROCEDURE — 97162 PT EVAL MOD COMPLEX 30 MIN: CPT | Mod: GP

## 2024-12-26 PROCEDURE — 84295 ASSAY OF SERUM SODIUM: CPT

## 2024-12-26 PROCEDURE — 71045 X-RAY EXAM CHEST 1 VIEW: CPT | Mod: 26

## 2024-12-26 PROCEDURE — 87641 MR-STAPH DNA AMP PROBE: CPT

## 2024-12-26 PROCEDURE — 84132 ASSAY OF SERUM POTASSIUM: CPT

## 2024-12-26 PROCEDURE — 87640 STAPH A DNA AMP PROBE: CPT

## 2024-12-26 PROCEDURE — 93005 ELECTROCARDIOGRAM TRACING: CPT

## 2024-12-26 PROCEDURE — 99291 CRITICAL CARE FIRST HOUR: CPT

## 2024-12-26 PROCEDURE — 82962 GLUCOSE BLOOD TEST: CPT

## 2024-12-26 PROCEDURE — 80048 BASIC METABOLIC PNL TOTAL CA: CPT

## 2024-12-26 PROCEDURE — 83540 ASSAY OF IRON: CPT

## 2024-12-26 PROCEDURE — 82010 KETONE BODYS QUAN: CPT

## 2024-12-26 PROCEDURE — C1894: CPT

## 2024-12-26 PROCEDURE — 93307 TTE W/O DOPPLER COMPLETE: CPT

## 2024-12-26 PROCEDURE — 83550 IRON BINDING TEST: CPT

## 2024-12-26 PROCEDURE — 85025 COMPLETE CBC W/AUTO DIFF WBC: CPT

## 2024-12-26 PROCEDURE — 83036 HEMOGLOBIN GLYCOSYLATED A1C: CPT

## 2024-12-26 PROCEDURE — 85018 HEMOGLOBIN: CPT

## 2024-12-26 PROCEDURE — 85379 FIBRIN DEGRADATION QUANT: CPT

## 2024-12-26 PROCEDURE — 86850 RBC ANTIBODY SCREEN: CPT

## 2024-12-26 PROCEDURE — 83735 ASSAY OF MAGNESIUM: CPT

## 2024-12-26 PROCEDURE — 83605 ASSAY OF LACTIC ACID: CPT

## 2024-12-26 PROCEDURE — 84100 ASSAY OF PHOSPHORUS: CPT

## 2024-12-26 PROCEDURE — A9500: CPT

## 2024-12-26 PROCEDURE — C1887: CPT

## 2024-12-26 PROCEDURE — 87040 BLOOD CULTURE FOR BACTERIA: CPT

## 2024-12-26 PROCEDURE — 93306 TTE W/DOPPLER COMPLETE: CPT

## 2024-12-26 PROCEDURE — 85027 COMPLETE CBC AUTOMATED: CPT

## 2024-12-26 PROCEDURE — 80053 COMPREHEN METABOLIC PANEL: CPT

## 2024-12-26 PROCEDURE — 71250 CT THORAX DX C-: CPT | Mod: MC

## 2024-12-26 PROCEDURE — 93010 ELECTROCARDIOGRAM REPORT: CPT

## 2024-12-26 PROCEDURE — C1769: CPT

## 2024-12-26 PROCEDURE — 84484 ASSAY OF TROPONIN QUANT: CPT

## 2024-12-26 PROCEDURE — 85610 PROTHROMBIN TIME: CPT

## 2024-12-26 PROCEDURE — 78452 HT MUSCLE IMAGE SPECT MULT: CPT

## 2024-12-26 PROCEDURE — 80061 LIPID PANEL: CPT

## 2024-12-26 PROCEDURE — P9047: CPT | Mod: JZ

## 2024-12-26 PROCEDURE — 87449 NOS EACH ORGANISM AG IA: CPT

## 2024-12-26 PROCEDURE — 84443 ASSAY THYROID STIM HORMONE: CPT

## 2024-12-26 PROCEDURE — 36415 COLL VENOUS BLD VENIPUNCTURE: CPT

## 2024-12-26 PROCEDURE — 85730 THROMBOPLASTIN TIME PARTIAL: CPT

## 2024-12-26 PROCEDURE — 93456 R HRT CORONARY ARTERY ANGIO: CPT

## 2024-12-26 PROCEDURE — 97116 GAIT TRAINING THERAPY: CPT | Mod: GP

## 2024-12-26 PROCEDURE — 86901 BLOOD TYPING SEROLOGIC RH(D): CPT

## 2024-12-26 PROCEDURE — 86900 BLOOD TYPING SEROLOGIC ABO: CPT

## 2024-12-26 PROCEDURE — 84145 PROCALCITONIN (PCT): CPT

## 2024-12-26 PROCEDURE — 82728 ASSAY OF FERRITIN: CPT

## 2024-12-26 RX ORDER — FUROSEMIDE 20 MG
80 TABLET ORAL ONCE
Refills: 0 | Status: COMPLETED | OUTPATIENT
Start: 2024-12-26 | End: 2024-12-26

## 2024-12-26 RX ORDER — KETOROLAC TROMETHAMINE 30 MG/ML
15 INJECTION INTRAMUSCULAR; INTRAVENOUS ONCE
Refills: 0 | Status: DISCONTINUED | OUTPATIENT
Start: 2024-12-26 | End: 2024-12-26

## 2024-12-26 RX ORDER — ASPIRIN 81 MG
325 TABLET, DELAYED RELEASE (ENTERIC COATED) ORAL ONCE
Refills: 0 | Status: COMPLETED | OUTPATIENT
Start: 2024-12-26 | End: 2024-12-26

## 2024-12-26 RX ORDER — ASPIRIN 81 MG
81 TABLET, DELAYED RELEASE (ENTERIC COATED) ORAL DAILY
Refills: 0 | Status: DISCONTINUED | OUTPATIENT
Start: 2024-12-26 | End: 2025-01-04

## 2024-12-26 RX ORDER — AZITHROMYCIN MONOHYDRATE 200 MG/5ML
500 POWDER, FOR SUSPENSION ORAL ONCE
Refills: 0 | Status: DISCONTINUED | OUTPATIENT
Start: 2024-12-26 | End: 2024-12-26

## 2024-12-26 RX ORDER — TICAGRELOR 60 MG/1
180 TABLET ORAL ONCE
Refills: 0 | Status: COMPLETED | OUTPATIENT
Start: 2024-12-26 | End: 2024-12-26

## 2024-12-26 RX ORDER — HEPARIN SODIUM 1000 [USP'U]/ML
INJECTION, SOLUTION INTRAVENOUS; SUBCUTANEOUS
Qty: 25000 | Refills: 0 | Status: DISCONTINUED | OUTPATIENT
Start: 2024-12-26 | End: 2024-12-27

## 2024-12-26 RX ORDER — CEFTRIAXONE SODIUM 1 G/1
2000 INJECTION, POWDER, FOR SOLUTION INTRAMUSCULAR; INTRAVENOUS ONCE
Refills: 0 | Status: DISCONTINUED | OUTPATIENT
Start: 2024-12-26 | End: 2024-12-26

## 2024-12-26 RX ORDER — IPRATROPIUM BROMIDE AND ALBUTEROL SULFATE .5; 2.5 MG/3ML; MG/3ML
3 SOLUTION RESPIRATORY (INHALATION)
Refills: 0 | Status: COMPLETED | OUTPATIENT
Start: 2024-12-26 | End: 2024-12-26

## 2024-12-26 RX ORDER — METHYLPREDNISOLONE 4 MG/1
125 TABLET ORAL ONCE
Refills: 0 | Status: COMPLETED | OUTPATIENT
Start: 2024-12-26 | End: 2024-12-26

## 2024-12-26 RX ORDER — TICAGRELOR 60 MG/1
90 TABLET ORAL EVERY 12 HOURS
Refills: 0 | Status: DISCONTINUED | OUTPATIENT
Start: 2024-12-27 | End: 2025-01-02

## 2024-12-26 RX ADMIN — METHYLPREDNISOLONE 125 MILLIGRAM(S): 4 TABLET ORAL at 22:25

## 2024-12-26 RX ADMIN — IPRATROPIUM BROMIDE AND ALBUTEROL SULFATE 3 MILLILITER(S): .5; 2.5 SOLUTION RESPIRATORY (INHALATION) at 22:26

## 2024-12-26 RX ADMIN — IPRATROPIUM BROMIDE AND ALBUTEROL SULFATE 3 MILLILITER(S): .5; 2.5 SOLUTION RESPIRATORY (INHALATION) at 22:27

## 2024-12-26 RX ADMIN — Medication 80 MILLIGRAM(S): at 23:30

## 2024-12-26 RX ADMIN — Medication 325 MILLIGRAM(S): at 23:27

## 2024-12-26 RX ADMIN — IPRATROPIUM BROMIDE AND ALBUTEROL SULFATE 3 MILLILITER(S): .5; 2.5 SOLUTION RESPIRATORY (INHALATION) at 22:22

## 2024-12-26 RX ADMIN — KETOROLAC TROMETHAMINE 15 MILLIGRAM(S): 30 INJECTION INTRAMUSCULAR; INTRAVENOUS at 22:25

## 2024-12-26 RX ADMIN — TICAGRELOR 180 MILLIGRAM(S): 60 TABLET ORAL at 23:27

## 2024-12-26 NOTE — ED PROVIDER NOTE - PROGRESS NOTE DETAILS
I reviewed the patient's EKG showing concerning ST elevations in V2 V3, nonspecific T wave abnormalities.  This is new and there are some Dynamic changes.  Posted in the STEMI chat given patient having active chest pain.  Cardiology came to bedside immediately.  Evaluating patient.  During evaluation patient's chest x-ray resulted showing bilateral consolidations consistent with pneumonia.  Giving antibiotics FF: cardio consulted and is as bedside. FF; as per cardio fellow can start pt on aspirin, brillinta, heparin drip and admit to ccu under dr. lange. also reports to cancel cta chest and will plan for cath tomorrow.

## 2024-12-26 NOTE — ED ADULT NURSE NOTE - NS ED NURSE TRANSPORT WITH
Cardiac Monitor/Defib/ACLS/Rescue Kit/O2/BVM/bipap Cardiac Monitor/Defib/ACLS/Rescue Kit/O2/BVM/oxygen/bipap

## 2024-12-26 NOTE — ED PROVIDER NOTE - CARE PLAN
1 Principal Discharge DX:	ACS (acute coronary syndrome)  Secondary Diagnosis:	PNA (pneumonia)  Secondary Diagnosis:	Pulmonary edema, acute

## 2024-12-26 NOTE — ED PROVIDER NOTE - CLINICAL SUMMARY MEDICAL DECISION MAKING FREE TEXT BOX
44-year-old male history of smoking, diabetes hypertension presenting with 1 week of shortness of breath.  Also reporting being sick 2 weeks ago with fever cough chills.  Patient reports that shortness of breath is worse when he lays flat.  It is also worse when he is walking and he gets very dyspneic when working.  He is satting well on room air with normal vital signs afebrile.  I reviewed his EKG immediately upon receiving and noted patient to have Q waves ST elevations new T waves returned to be different than his prior EKG.  Concern for acute coronary syndrome.  Activated the cardiology consultant to came to bedside immediately.  On their bedside POCUS they noted wall motion abnormalities.  Chest x-ray resulted with bilateral opacities concerning for possible pneumonia so I ordered antibiotics.  After discussing with the cardiologist it is felt this is more likely flash pulmonary edema.  Cardiology team wanted to dc the antibiotics as this is more likely CHF.  Patient has small leukocytosis no lactate no fever.  Patient will be plan for catheterization tomorrow.  Started on heparin drip possible nitro.  Admitted to the cardiology ICU 44-year-old male history of smoking, diabetes hypertension presenting with 1 week of shortness of breath.  Also reporting being sick 2 weeks ago with fever cough chills.  Patient reports that shortness of breath is worse when he lays flat.  It is also worse when he is walking and he gets very dyspneic when working.  He is satting well on room air with normal vital signs afebrile.  I reviewed his EKG immediately upon receiving and noted patient to have Q waves ST elevations new T waves returned to be different than his prior EKG.  Concern for acute coronary syndrome.  Activated the cardiology consultant to came to bedside immediately.  On their bedside POCUS they noted wall motion abnormalities.  Chest x-ray resulted with bilateral opacities concerning for possible pneumonia so I ordered antibiotics.  After discussing with the cardiologist it is felt this is more likely flash pulmonary edema.  Cardiology team wanted to dc the antibiotics as this is more likely CHF.  Patient has small leukocytosis no lactate no fever.  Patient will be plan for catheterization tomorrow.  Started on heparin drip possible nitro.  Admitted to the cardiology ICU. started on BiPAP    Attending Statement: I have personally provided the amount of critical care time documented below excluding time spent on separate procedures.     Critical Care Time Spent (min) Must be 30 or more minutes to qualify: 35.

## 2024-12-26 NOTE — ED ADULT TRIAGE NOTE - HISTORY OF COVID-19 VACCINATION
Yes Daily Assessment O-T Plasty Text: The defect edges were debeveled with a #15 scalpel blade.  Given the location of the defect, shape of the defect and the proximity to free margins an O-T plasty was deemed most appropriate.  Using a sterile surgical marker, an appropriate O-T plasty was drawn incorporating the defect and placing the expected incisions within the relaxed skin tension lines where possible.    The area thus outlined was incised deep to adipose tissue with a #15 scalpel blade.  The skin margins were undermined to an appropriate distance in all directions utilizing iris scissors.

## 2024-12-26 NOTE — ED PROVIDER NOTE - PHYSICAL EXAMINATION
Physical Exam    Vital Signs: I have reviewed the initial vital signs.  Constitutional: well-nourished, appears stated age, no acute distress  Eyes: Conjunctiva pink, Sclera clear  Cardiovascular: regular rate, regular rhythm, well-perfused extremities, radial pulses equal and 2+ b/l.   Respiratory: (+) labored respiratory effort, (+) decrease breath sounds on the right. pt is speaking full sentences. (+) accessory muscle use. pt o2 sat is 96% on RA and with exertion.   Gastrointestinal: soft, non-tender, nondistended abdomen, no pulsatile mass, no rebound, no guarding  Musculoskeletal: FROM of b/l upper and lower extremities, no lower extremity edema, no calf tenderness  Integumentary: warm, dry, no rash  Neurologic: awake, alert  Psychiatric: appropriate mood, appropriate affect

## 2024-12-26 NOTE — ED ADULT TRIAGE NOTE - CHIEF COMPLAINT QUOTE
I've been sick for about two weeks, cough, fever. I got better, then worse and now I can't breathe - patient  Patient insulin dependent diabetic, smoker

## 2024-12-26 NOTE — ED PROVIDER NOTE - OBJECTIVE STATEMENT
44-year-old male with a past medical history of diabetes, hepatitis B, and hypertension presents to the ED for evaluation of shortness of breath x 1 week.  Patient reports he has pain with inspiration.  Patient reports he has a cough with white and sometimes brown sputum.  Patient reports he has a clear runny nose.  Patient reports he has pain in his chest when he leans back that improves when he leans forward.  Patient is a current daily smoker.  Patient denies fever, chills, sore throat, back pain, neck pain, arm pain, jaw pain, abdominal pain, nausea, vomiting, diarrhea, constipation, urinary symptoms, leg pain, leg swelling, recent travel, recent trauma, recent surgeries, recent hospitalizations, history of cancer, use of hormones, history of blood clots, or family history of CAD.  Patient reports he has occasionally done nirav dust before.

## 2024-12-27 ENCOUNTER — RESULT REVIEW (OUTPATIENT)
Age: 44
End: 2024-12-27

## 2024-12-27 LAB
A1C WITH ESTIMATED AVERAGE GLUCOSE RESULT: 10.8 % — HIGH (ref 4–5.6)
ALBUMIN SERPL ELPH-MCNC: 2.7 G/DL — LOW (ref 3.5–5.2)
ALBUMIN SERPL ELPH-MCNC: 2.9 G/DL — LOW (ref 3.5–5.2)
ALP SERPL-CCNC: 142 U/L — HIGH (ref 30–115)
ALP SERPL-CCNC: 146 U/L — HIGH (ref 30–115)
ALT FLD-CCNC: 29 U/L — SIGNIFICANT CHANGE UP (ref 0–41)
ALT FLD-CCNC: 31 U/L — SIGNIFICANT CHANGE UP (ref 0–41)
ANION GAP SERPL CALC-SCNC: 12 MMOL/L — SIGNIFICANT CHANGE UP (ref 7–14)
ANION GAP SERPL CALC-SCNC: 13 MMOL/L — SIGNIFICANT CHANGE UP (ref 7–14)
APTT BLD: 29.6 SEC — SIGNIFICANT CHANGE UP (ref 27–39.2)
APTT BLD: 63.1 SEC — HIGH (ref 27–39.2)
AST SERPL-CCNC: 29 U/L — SIGNIFICANT CHANGE UP (ref 0–41)
AST SERPL-CCNC: 32 U/L — SIGNIFICANT CHANGE UP (ref 0–41)
BASOPHILS # BLD AUTO: 0.01 K/UL — SIGNIFICANT CHANGE UP (ref 0–0.2)
BASOPHILS NFR BLD AUTO: 0.1 % — SIGNIFICANT CHANGE UP (ref 0–1)
BILIRUB SERPL-MCNC: <0.2 MG/DL — SIGNIFICANT CHANGE UP (ref 0.2–1.2)
BILIRUB SERPL-MCNC: <0.2 MG/DL — SIGNIFICANT CHANGE UP (ref 0.2–1.2)
BUN SERPL-MCNC: 12 MG/DL — SIGNIFICANT CHANGE UP (ref 10–20)
BUN SERPL-MCNC: 16 MG/DL — SIGNIFICANT CHANGE UP (ref 10–20)
CALCIUM SERPL-MCNC: 8.2 MG/DL — LOW (ref 8.4–10.4)
CALCIUM SERPL-MCNC: 8.3 MG/DL — LOW (ref 8.4–10.5)
CHLORIDE SERPL-SCNC: 98 MMOL/L — SIGNIFICANT CHANGE UP (ref 98–110)
CHLORIDE SERPL-SCNC: 98 MMOL/L — SIGNIFICANT CHANGE UP (ref 98–110)
CHOLEST SERPL-MCNC: 226 MG/DL — HIGH
CO2 SERPL-SCNC: 24 MMOL/L — SIGNIFICANT CHANGE UP (ref 17–32)
CO2 SERPL-SCNC: 24 MMOL/L — SIGNIFICANT CHANGE UP (ref 17–32)
CREAT SERPL-MCNC: 0.5 MG/DL — LOW (ref 0.7–1.5)
CREAT SERPL-MCNC: 0.7 MG/DL — SIGNIFICANT CHANGE UP (ref 0.7–1.5)
EGFR: 117 ML/MIN/1.73M2 — SIGNIFICANT CHANGE UP
EGFR: 129 ML/MIN/1.73M2 — SIGNIFICANT CHANGE UP
EOSINOPHIL # BLD AUTO: 0 K/UL — SIGNIFICANT CHANGE UP (ref 0–0.7)
EOSINOPHIL NFR BLD AUTO: 0 % — SIGNIFICANT CHANGE UP (ref 0–8)
ERYTHROCYTE [SEDIMENTATION RATE] IN BLOOD: 120 MM/HR — HIGH (ref 0–10)
ESTIMATED AVERAGE GLUCOSE: 263 MG/DL — HIGH (ref 68–114)
GAS PNL BLDA: SIGNIFICANT CHANGE UP
GLUCOSE BLDC GLUCOMTR-MCNC: 110 MG/DL — HIGH (ref 70–99)
GLUCOSE BLDC GLUCOMTR-MCNC: 160 MG/DL — HIGH (ref 70–99)
GLUCOSE BLDC GLUCOMTR-MCNC: 162 MG/DL — HIGH (ref 70–99)
GLUCOSE BLDC GLUCOMTR-MCNC: 219 MG/DL — HIGH (ref 70–99)
GLUCOSE BLDC GLUCOMTR-MCNC: 258 MG/DL — HIGH (ref 70–99)
GLUCOSE BLDC GLUCOMTR-MCNC: 277 MG/DL — HIGH (ref 70–99)
GLUCOSE BLDC GLUCOMTR-MCNC: 369 MG/DL — HIGH (ref 70–99)
GLUCOSE BLDC GLUCOMTR-MCNC: 375 MG/DL — HIGH (ref 70–99)
GLUCOSE BLDC GLUCOMTR-MCNC: 406 MG/DL — HIGH (ref 70–99)
GLUCOSE SERPL-MCNC: 280 MG/DL — HIGH (ref 70–99)
GLUCOSE SERPL-MCNC: 283 MG/DL — HIGH (ref 70–99)
HCT VFR BLD CALC: 38 % — LOW (ref 42–52)
HDLC SERPL-MCNC: 50 MG/DL — SIGNIFICANT CHANGE UP
HGB BLD-MCNC: 12.9 G/DL — LOW (ref 14–18)
IMM GRANULOCYTES NFR BLD AUTO: 0.7 % — HIGH (ref 0.1–0.3)
INR BLD: 1.07 RATIO — SIGNIFICANT CHANGE UP (ref 0.65–1.3)
LIPID PNL WITH DIRECT LDL SERPL: 159 MG/DL — HIGH
LYMPHOCYTES # BLD AUTO: 0.53 K/UL — LOW (ref 1.2–3.4)
LYMPHOCYTES # BLD AUTO: 4.7 % — LOW (ref 20.5–51.1)
MAGNESIUM SERPL-MCNC: 1.8 MG/DL — SIGNIFICANT CHANGE UP (ref 1.8–2.4)
MAGNESIUM SERPL-MCNC: 1.9 MG/DL — SIGNIFICANT CHANGE UP (ref 1.8–2.4)
MAGNESIUM SERPL-MCNC: 1.9 MG/DL — SIGNIFICANT CHANGE UP (ref 1.8–2.4)
MCHC RBC-ENTMCNC: 31.6 PG — HIGH (ref 27–31)
MCHC RBC-ENTMCNC: 33.9 G/DL — SIGNIFICANT CHANGE UP (ref 32–37)
MCV RBC AUTO: 93.1 FL — SIGNIFICANT CHANGE UP (ref 80–94)
MONOCYTES # BLD AUTO: 0.11 K/UL — SIGNIFICANT CHANGE UP (ref 0.1–0.6)
MONOCYTES NFR BLD AUTO: 1 % — LOW (ref 1.7–9.3)
NEUTROPHILS # BLD AUTO: 10.58 K/UL — HIGH (ref 1.4–6.5)
NEUTROPHILS NFR BLD AUTO: 93.5 % — HIGH (ref 42.2–75.2)
NON HDL CHOLESTEROL: 176 MG/DL — HIGH
NRBC # BLD: 0 /100 WBCS — SIGNIFICANT CHANGE UP (ref 0–0)
PHOSPHATE SERPL-MCNC: 2.8 MG/DL — SIGNIFICANT CHANGE UP (ref 2.1–4.9)
PLATELET # BLD AUTO: 530 K/UL — HIGH (ref 130–400)
PMV BLD: 9.1 FL — SIGNIFICANT CHANGE UP (ref 7.4–10.4)
POTASSIUM SERPL-MCNC: 3.9 MMOL/L — SIGNIFICANT CHANGE UP (ref 3.5–5)
POTASSIUM SERPL-MCNC: 4.1 MMOL/L — SIGNIFICANT CHANGE UP (ref 3.5–5)
POTASSIUM SERPL-SCNC: 3.9 MMOL/L — SIGNIFICANT CHANGE UP (ref 3.5–5)
POTASSIUM SERPL-SCNC: 4.1 MMOL/L — SIGNIFICANT CHANGE UP (ref 3.5–5)
PROT SERPL-MCNC: 6.1 G/DL — SIGNIFICANT CHANGE UP (ref 6–8)
PROT SERPL-MCNC: 6.1 G/DL — SIGNIFICANT CHANGE UP (ref 6–8)
PROTHROM AB SERPL-ACNC: 12.6 SEC — SIGNIFICANT CHANGE UP (ref 9.95–12.87)
RBC # BLD: 4.08 M/UL — LOW (ref 4.7–6.1)
RBC # FLD: 12.3 % — SIGNIFICANT CHANGE UP (ref 11.5–14.5)
SODIUM SERPL-SCNC: 134 MMOL/L — LOW (ref 135–146)
SODIUM SERPL-SCNC: 135 MMOL/L — SIGNIFICANT CHANGE UP (ref 135–146)
TRIGL SERPL-MCNC: 84 MG/DL — SIGNIFICANT CHANGE UP
TROPONIN T, HIGH SENSITIVITY RESULT: 309 NG/L — CRITICAL HIGH (ref 6–21)
TROPONIN T, HIGH SENSITIVITY RESULT: 356 NG/L — CRITICAL HIGH (ref 6–21)
TROPONIN T, HIGH SENSITIVITY RESULT: 400 NG/L — CRITICAL HIGH (ref 6–21)
TSH SERPL-MCNC: 0.99 UIU/ML — SIGNIFICANT CHANGE UP (ref 0.27–4.2)
WBC # BLD: 11.31 K/UL — HIGH (ref 4.8–10.8)
WBC # FLD AUTO: 11.31 K/UL — HIGH (ref 4.8–10.8)

## 2024-12-27 PROCEDURE — 99291 CRITICAL CARE FIRST HOUR: CPT

## 2024-12-27 PROCEDURE — 93010 ELECTROCARDIOGRAM REPORT: CPT

## 2024-12-27 PROCEDURE — 71045 X-RAY EXAM CHEST 1 VIEW: CPT | Mod: 26,77

## 2024-12-27 PROCEDURE — 93306 TTE W/DOPPLER COMPLETE: CPT | Mod: 26

## 2024-12-27 PROCEDURE — 71045 X-RAY EXAM CHEST 1 VIEW: CPT | Mod: 26

## 2024-12-27 RX ORDER — DEXTROSE MONOHYDRATE 25 G/50ML
15 INJECTION, SOLUTION INTRAVENOUS ONCE
Refills: 0 | Status: DISCONTINUED | OUTPATIENT
Start: 2024-12-27 | End: 2024-12-30

## 2024-12-27 RX ORDER — INSULIN LISPRO 100/ML
6 VIAL (ML) SUBCUTANEOUS
Refills: 0 | Status: DISCONTINUED | OUTPATIENT
Start: 2024-12-27 | End: 2024-12-27

## 2024-12-27 RX ORDER — POTASSIUM CHLORIDE 600 MG/1
40 TABLET, FILM COATED, EXTENDED RELEASE ORAL ONCE
Refills: 0 | Status: COMPLETED | OUTPATIENT
Start: 2024-12-27 | End: 2024-12-27

## 2024-12-27 RX ORDER — INSULIN LISPRO 100/ML
VIAL (ML) SUBCUTANEOUS
Refills: 0 | Status: DISCONTINUED | OUTPATIENT
Start: 2024-12-27 | End: 2024-12-27

## 2024-12-27 RX ORDER — DEXTROSE MONOHYDRATE 25 G/50ML
25 INJECTION, SOLUTION INTRAVENOUS ONCE
Refills: 0 | Status: DISCONTINUED | OUTPATIENT
Start: 2024-12-27 | End: 2024-12-30

## 2024-12-27 RX ORDER — SODIUM CHLORIDE 9 MG/ML
1000 INJECTION, SOLUTION INTRAVENOUS
Refills: 0 | Status: DISCONTINUED | OUTPATIENT
Start: 2024-12-27 | End: 2024-12-30

## 2024-12-27 RX ORDER — HEPARIN SODIUM 1000 [USP'U]/ML
8000 INJECTION, SOLUTION INTRAVENOUS; SUBCUTANEOUS EVERY 6 HOURS
Refills: 0 | Status: DISCONTINUED | OUTPATIENT
Start: 2024-12-27 | End: 2025-01-03

## 2024-12-27 RX ORDER — FUROSEMIDE 20 MG
80 TABLET ORAL ONCE
Refills: 0 | Status: DISCONTINUED | OUTPATIENT
Start: 2024-12-27 | End: 2024-12-27

## 2024-12-27 RX ORDER — INSULIN HUMAN 100 [IU]/ML
5 INJECTION, SOLUTION SUBCUTANEOUS
Qty: 100 | Refills: 0 | Status: DISCONTINUED | OUTPATIENT
Start: 2024-12-27 | End: 2024-12-27

## 2024-12-27 RX ORDER — DEXTROSE MONOHYDRATE 25 G/50ML
12.5 INJECTION, SOLUTION INTRAVENOUS ONCE
Refills: 0 | Status: DISCONTINUED | OUTPATIENT
Start: 2024-12-27 | End: 2024-12-30

## 2024-12-27 RX ORDER — INSULIN LISPRO 100/ML
6 VIAL (ML) SUBCUTANEOUS ONCE
Refills: 0 | Status: DISCONTINUED | OUTPATIENT
Start: 2024-12-27 | End: 2024-12-27

## 2024-12-27 RX ORDER — ACETAMINOPHEN 80 MG/.8ML
650 SOLUTION/ DROPS ORAL EVERY 6 HOURS
Refills: 0 | Status: DISCONTINUED | OUTPATIENT
Start: 2024-12-27 | End: 2025-01-16

## 2024-12-27 RX ORDER — INSULIN GLARGINE-YFGN 100 [IU]/ML
18 INJECTION, SOLUTION SUBCUTANEOUS ONCE
Refills: 0 | Status: DISCONTINUED | OUTPATIENT
Start: 2024-12-27 | End: 2024-12-27

## 2024-12-27 RX ORDER — ORAL SEMAGLUTIDE 3 MG/1
2 TABLET ORAL
Refills: 0 | DISCHARGE

## 2024-12-27 RX ORDER — INSULIN LISPRO 100/ML
VIAL (ML) SUBCUTANEOUS
Refills: 0 | Status: DISCONTINUED | OUTPATIENT
Start: 2024-12-27 | End: 2025-01-08

## 2024-12-27 RX ORDER — NICOTINE POLACRILEX 4 MG/1
1 LOZENGE ORAL EVERY 24 HOURS
Refills: 0 | Status: DISCONTINUED | OUTPATIENT
Start: 2024-12-27 | End: 2025-01-16

## 2024-12-27 RX ORDER — ISOSORBIDE DINITRATE 10 MG
10 TABLET ORAL THREE TIMES A DAY
Refills: 0 | Status: DISCONTINUED | OUTPATIENT
Start: 2024-12-27 | End: 2024-12-29

## 2024-12-27 RX ORDER — INSULIN GLARGINE-YFGN 100 [IU]/ML
18 INJECTION, SOLUTION SUBCUTANEOUS AT BEDTIME
Refills: 0 | Status: DISCONTINUED | OUTPATIENT
Start: 2024-12-27 | End: 2024-12-27

## 2024-12-27 RX ORDER — FUROSEMIDE 20 MG
80 TABLET ORAL ONCE
Refills: 0 | Status: COMPLETED | OUTPATIENT
Start: 2024-12-27 | End: 2024-12-27

## 2024-12-27 RX ORDER — CHLORHEXIDINE GLUCONATE 1.2 MG/ML
1 RINSE ORAL DAILY
Refills: 0 | Status: DISCONTINUED | OUTPATIENT
Start: 2024-12-27 | End: 2025-01-16

## 2024-12-27 RX ORDER — MAGNESIUM SULFATE 500 MG/ML
2 INJECTION, SOLUTION INTRAMUSCULAR; INTRAVENOUS ONCE
Refills: 0 | Status: COMPLETED | OUTPATIENT
Start: 2024-12-27 | End: 2024-12-27

## 2024-12-27 RX ORDER — HEPARIN SODIUM 1000 [USP'U]/ML
INJECTION, SOLUTION INTRAVENOUS; SUBCUTANEOUS
Qty: 25000 | Refills: 0 | Status: DISCONTINUED | OUTPATIENT
Start: 2024-12-27 | End: 2025-01-03

## 2024-12-27 RX ORDER — LORAZEPAM 1 MG/1
0.25 TABLET ORAL ONCE
Refills: 0 | Status: DISCONTINUED | OUTPATIENT
Start: 2024-12-27 | End: 2024-12-27

## 2024-12-27 RX ORDER — HEPARIN SODIUM 1000 [USP'U]/ML
4000 INJECTION, SOLUTION INTRAVENOUS; SUBCUTANEOUS EVERY 6 HOURS
Refills: 0 | Status: DISCONTINUED | OUTPATIENT
Start: 2024-12-27 | End: 2025-01-03

## 2024-12-27 RX ORDER — GLUCAGON INJECTION, SOLUTION 0.5 MG/.1ML
1 INJECTION, SOLUTION SUBCUTANEOUS ONCE
Refills: 0 | Status: DISCONTINUED | OUTPATIENT
Start: 2024-12-27 | End: 2025-01-16

## 2024-12-27 RX ORDER — PANTOPRAZOLE 40 MG/1
40 TABLET, DELAYED RELEASE ORAL
Refills: 0 | Status: DISCONTINUED | OUTPATIENT
Start: 2024-12-27 | End: 2025-01-16

## 2024-12-27 RX ADMIN — LORAZEPAM 0.25 MILLIGRAM(S): 1 TABLET ORAL at 00:50

## 2024-12-27 RX ADMIN — TICAGRELOR 90 MILLIGRAM(S): 60 TABLET ORAL at 17:43

## 2024-12-27 RX ADMIN — POTASSIUM CHLORIDE 40 MILLIEQUIVALENT(S): 600 TABLET, FILM COATED, EXTENDED RELEASE ORAL at 23:42

## 2024-12-27 RX ADMIN — NICOTINE POLACRILEX 1 PATCH: 4 LOZENGE ORAL at 20:00

## 2024-12-27 RX ADMIN — Medication 3: at 17:50

## 2024-12-27 RX ADMIN — Medication 10 MILLIGRAM(S): at 02:00

## 2024-12-27 RX ADMIN — INSULIN HUMAN 5 UNIT(S)/HR: 100 INJECTION, SOLUTION SUBCUTANEOUS at 02:00

## 2024-12-27 RX ADMIN — Medication 25 MILLIGRAM(S): at 20:40

## 2024-12-27 RX ADMIN — HEPARIN SODIUM 1800 UNIT(S)/HR: 1000 INJECTION, SOLUTION INTRAVENOUS; SUBCUTANEOUS at 19:58

## 2024-12-27 RX ADMIN — Medication 10 MILLIGRAM(S): at 05:18

## 2024-12-27 RX ADMIN — HEPARIN SODIUM 1700 UNIT(S)/HR: 1000 INJECTION, SOLUTION INTRAVENOUS; SUBCUTANEOUS at 00:59

## 2024-12-27 RX ADMIN — Medication 10 MILLIGRAM(S): at 11:58

## 2024-12-27 RX ADMIN — TICAGRELOR 90 MILLIGRAM(S): 60 TABLET ORAL at 05:18

## 2024-12-27 RX ADMIN — HEPARIN SODIUM 1700 UNIT(S)/HR: 1000 INJECTION, SOLUTION INTRAVENOUS; SUBCUTANEOUS at 06:53

## 2024-12-27 RX ADMIN — Medication 80 MILLIGRAM(S): at 17:42

## 2024-12-27 RX ADMIN — NICOTINE POLACRILEX 1 PATCH: 4 LOZENGE ORAL at 02:00

## 2024-12-27 RX ADMIN — CHLORHEXIDINE GLUCONATE 1 APPLICATION(S): 1.2 RINSE ORAL at 12:01

## 2024-12-27 RX ADMIN — ACETAMINOPHEN 650 MILLIGRAM(S): 80 SOLUTION/ DROPS ORAL at 23:42

## 2024-12-27 RX ADMIN — Medication 10 MILLIGRAM(S): at 17:43

## 2024-12-27 RX ADMIN — POTASSIUM CHLORIDE 40 MILLIEQUIVALENT(S): 600 TABLET, FILM COATED, EXTENDED RELEASE ORAL at 02:19

## 2024-12-27 RX ADMIN — Medication 80 MILLIGRAM(S): at 04:50

## 2024-12-27 RX ADMIN — MAGNESIUM SULFATE 25 GRAM(S): 500 INJECTION, SOLUTION INTRAMUSCULAR; INTRAVENOUS at 23:42

## 2024-12-27 RX ADMIN — PANTOPRAZOLE 40 MILLIGRAM(S): 40 TABLET, DELAYED RELEASE ORAL at 11:58

## 2024-12-27 RX ADMIN — Medication 81 MILLIGRAM(S): at 11:58

## 2024-12-27 NOTE — PROGRESS NOTE ADULT - ASSESSMENT
44M with HTN, IDDM, HLD, active smoker (1/2 PPD x 28 yrs), hepatitis B (s/p treatment) presents with CC of chest pressure and SOB.    IMPRESSION:    #Delayed presentation AW MI  #HTN  #HLD  #IDDM w/ hyperglycemia  #Active smoker  #Occasional alcohol use  #Occasional Timothy Dust use    The patient is not certain about the meds he takes at home, says he takes insulin, metformin, atorvastatin, but unsure of dosages. Also says he takes a med for HTN (does not know name or dose). Please call [facility] pharmacy on [address] to obtain med list.    -atorvastatin 40 QD  -lantus glargine 85u/daily  -ozempic 2mg weekly subQ  -Lispro 40u/daily  -jardiance 25mg QD PO  -metformin 1000 BID  -Losartan 25mg QD (march)  PLAN:    CNS:   - Given one dose of 0.25mg IV lorazepam to attempt to keep BIPAP on.  - No depressants.     HEENT:   - Oral care    PULMONARY:  - VBG noted.  - The patient did not tolerate BIPAP despite counseling.  - He is stable on non-rebreather.    CARDIOVASCULAR:   - Troponin 400, f/u repeat.  - Noted moderate MR on POCUS.  - Started heparin gtt, f/u aPTT and follow ACS protocol.  - S/p ASA 325mg and ticagrelor 180mg --> continue ASA 81 QD and ticagrelor 90mg BID.  - Start isordil 10 TID.  - Avoid beta blocker.  - CXR noted w/ flash pulm edema --> s/p furosemide 80mg IV. If output < 800cc by 3:30am, will start bumetanide 1mg/hr drip.  - Monitor urine output, target -2L over 24 H.  - F/u TTE.  - F/u A1c, lipid, TSH.  - NPO for now for possible cath in AM.    GI:   - GI prophylaxis w/ pantoprazole 40mg IV QD.  - Oral diet pending possible cath.    RENAL:  - Follow up lytes. Correct as needed.    INFECTIOUS DISEASE:  - Monitor off abx.  - CHG 2% daily bath    HEMATOLOGICAL:    - Heparin drip ACS protocol.    ENDOCRINE:    - Has IDDM, unsure of dosing but thinks he takes Lantus 30 at bedtime and Lispro 12 TID pre-meals. Has NOT taken it in 2 weeks. Also, metformin 500 BID.  - Start insulin drip hyperglycemia protocol.    MUSCULOSKELETAL:   - increase ambulation as tolerated    CCU monitoring     44M with HTN, IDDM, HLD, active smoker (1/2 PPD x 28 yrs), hepatitis B (s/p treatment) presents with CC of chest pressure and SOB.    IMPRESSION:    #Delayed presentation AW MI  #HTN  #HLD  #IDDM w/ hyperglycemia  #Active smoker  #Occasional alcohol use  #Occasional Timothy Dust use    T  PLAN:    CNS:   - Given one dose of 0.25mg IV lorazepam to attempt to keep BIPAP on.  - No depressants.     HEENT:   - Oral care    PULMONARY:  - VBG noted.  - The patient did not tolerate BIPAP despite counseling.  - He is stable on non-rebreather.    CARDIOVASCULAR:   - Troponin 400, f/u repeat.  - Noted moderate MR on POCUS.  - Started heparin gtt, f/u aPTT and follow ACS protocol.  - S/p ASA 325mg and ticagrelor 180mg --> continue ASA 81 QD and ticagrelor 90mg BID.  - Start isordil 10 TID.  - Avoid beta blocker.  - CXR noted w/ flash pulm edema --> s/p furosemide 80mg IV. If output < 800cc by 3:30am, will start bumetanide 1mg/hr drip.  - Monitor urine output, target -2L over 24 H.  - F/u TTE.  - F/u A1c, lipid, TSH.  - can eat, NPO once cath is planned    GI:   - GI prophylaxis w/ pantoprazole 40mg IV QD.    RENAL:  - Follow up lytes. Correct as needed.    INFECTIOUS DISEASE:  - Monitor off abx.  - CHG 2% daily bath    HEMATOLOGICAL:    - Heparin drip ACS protocol.    ENDOCRINE:    - Has IDDM, unsure of dosing but thinks he takes Lantus 30 at bedtime and Lispro 12 TID pre-meals. Has NOT taken it in 2 weeks. Also, metformin 500 BID.  - Start insulin drip hyperglycemia protocol.    MUSCULOSKELETAL:   - increase ambulation as tolerated    CCU monitoring

## 2024-12-27 NOTE — H&P ADULT - ASSESSMENT
44M with HTN, IDDM, HLD, active smoker (1/2 PPD x 28 yrs), hepatitis B (s/p treatment) presents with CC of chest pressure and SOB.    IMPRESSION:    #Delayed presentation AW MI  #HTN  #HLD  #IDDM w/ hyperglycemia  #Active smoker  #Occasional alcohol use  #Occasional Timothy Dust use    PLAN:    CNS:   - Given one dose of 0.25mg IV lorazepam to attempt to keep BIPAP on.  - No depressants.     HEENT:   - Oral care    PULMONARY:  - VBG noted.  - The patient did not tolerate BIPAP despite counseling.  - He is stable on non-rebreather.    CARDIOVASCULAR:   - Troponin 400, f/u repeat.  - Noted moderate MR on POCUS.  - Started heparin gtt, f/u aPTT and follow ACS protocol.  - S/p ASA 325mg and ticagrelor 180mg --> continue ASA 81 QD and ticagrelor 90mg BID.  - Start isordil 10 TID.  - Avoid beta blocker.  - CXR noted w/ flash pulm edema --> s/p furosemide 80mg IV. If output < 800cc by 3:30am, will start bumetanide 1mg/hr drip.  - Monitor urine output, target -2L over 24 H.  - F/u TTE.  - F/u A1c, lipid, TSH.  - NPO for now for possible cath in AM.    GI:   - GI prophylaxis w/ pantoprazole 40mg IV QD.  - Oral diet pending possible cath.    RENAL:  - Follow up lytes. Correct as needed.    INFECTIOUS DISEASE:  - Monitor off abx.  - CHG 2% daily bath    HEMATOLOGICAL:    - Heparin drip ACS protocol.    ENDOCRINE:    - Has IDDM, unsure of dosing but thinks he takes Lantus 30 at bedtime and Lispro 12 TID pre-meals. Has NOT taken it in 2 weeks. Also, metformin 500 BID.  - Start insulin drip hyperglycemia protocol.    MUSCULOSKELETAL:   - increase ambulation as tolerated    CCU monitoring 44M with HTN, IDDM, HLD, active smoker (1/2 PPD x 28 yrs), hepatitis B (s/p treatment) presents with CC of chest pressure and SOB.    IMPRESSION:    #Delayed presentation AW MI  #HTN  #HLD  #IDDM w/ hyperglycemia  #Active smoker  #Occasional alcohol use  #Occasional Timothy Dust use    The patient is not certain about the meds he takes at home, says he takes insulin, metformin, atorvastatin, but unsure of dosages. Also says he takes a med for HTN (does not know name or dose). Please call [facility] pharmacy on [address] to obtain med list.    PLAN:    CNS:   - Given one dose of 0.25mg IV lorazepam to attempt to keep BIPAP on.  - No depressants.     HEENT:   - Oral care    PULMONARY:  - VBG noted.  - The patient did not tolerate BIPAP despite counseling.  - He is stable on non-rebreather.    CARDIOVASCULAR:   - Troponin 400, f/u repeat.  - Noted moderate MR on POCUS.  - Started heparin gtt, f/u aPTT and follow ACS protocol.  - S/p ASA 325mg and ticagrelor 180mg --> continue ASA 81 QD and ticagrelor 90mg BID.  - Start isordil 10 TID.  - Avoid beta blocker.  - CXR noted w/ flash pulm edema --> s/p furosemide 80mg IV. If output < 800cc by 3:30am, will start bumetanide 1mg/hr drip.  - Monitor urine output, target -2L over 24 H.  - F/u TTE.  - F/u A1c, lipid, TSH.  - NPO for now for possible cath in AM.    GI:   - GI prophylaxis w/ pantoprazole 40mg IV QD.  - Oral diet pending possible cath.    RENAL:  - Follow up lytes. Correct as needed.    INFECTIOUS DISEASE:  - Monitor off abx.  - CHG 2% daily bath    HEMATOLOGICAL:    - Heparin drip ACS protocol.    ENDOCRINE:    - Has IDDM, unsure of dosing but thinks he takes Lantus 30 at bedtime and Lispro 12 TID pre-meals. Has NOT taken it in 2 weeks. Also, metformin 500 BID.  - Start insulin drip hyperglycemia protocol.    MUSCULOSKELETAL:   - increase ambulation as tolerated    CCU monitoring

## 2024-12-27 NOTE — PROGRESS NOTE ADULT - SUBJECTIVE AND OBJECTIVE BOX
SARAH FARMER 44y Male  MRN#: [id number]     Hospital Day: 1d    Pt is currently admitted with the primary diagnosis of  Acute ischemic heart disease        SUBJECTIVE     Overnight events  None    Subjective complaints  Pt was evaluated this am. Patient denied any active complaints and per patient his symptoms are improving                                            ----------------------------------------------------------  OBJECTIVE  PAST MEDICAL & SURGICAL HISTORY  HTN (hypertension)    Hepatitis B    DM (diabetes mellitus)                                              -----------------------------------------------------------  ALLERGIES:  Seafood (Urticaria)  No Known Drug Allergies  shellfish (Urticaria; Rash; Short breath)                                            ------------------------------------------------------------    HOME MEDICATIONS  Home Medications:                           MEDICATIONS:  STANDING MEDICATIONS  aspirin  chewable 81 milliGRAM(s) Oral daily  chlorhexidine 2% Cloths 1 Application(s) Topical daily  dextrose 5%. 1000 milliLiter(s) IV Continuous <Continuous>  dextrose 5%. 1000 milliLiter(s) IV Continuous <Continuous>  dextrose 50% Injectable 25 Gram(s) IV Push once  dextrose 50% Injectable 12.5 Gram(s) IV Push once  dextrose 50% Injectable 25 Gram(s) IV Push once  glucagon  Injectable 1 milliGRAM(s) IntraMuscular once  insulin lispro (ADMELOG) corrective regimen sliding scale   SubCutaneous three times a day before meals  isosorbide   dinitrate Tablet (ISORDIL) 10 milliGRAM(s) Oral three times a day  nicotine -  14 mG/24Hr(s) Patch 1 Patch Transdermal every 24 hours  pantoprazole  Injectable 40 milliGRAM(s) IV Push with breakfast  ticagrelor 90 milliGRAM(s) Oral every 12 hours    PRN MEDICATIONS  acetaminophen     Tablet .. 650 milliGRAM(s) Oral every 6 hours PRN  dextrose Oral Gel 15 Gram(s) Oral once PRN  dextrose Oral Gel 15 Gram(s) Oral once PRN                                            ------------------------------------------------------------  VITAL SIGNS: Last 24 Hours  T(C): 36.2 (27 Dec 2024 08:00), Max: 37 (26 Dec 2024 21:15)  T(F): 97.1 (27 Dec 2024 08:00), Max: 98.6 (26 Dec 2024 21:15)  HR: 105 (27 Dec 2024 14:00) (58 - 110)  BP: 126/69 (27 Dec 2024 14:00) (103/65 - 148/114)  BP(mean): 90 (27 Dec 2024 14:00) (79 - 128)  RR: 35 (27 Dec 2024 14:00) (26 - 45)  SpO2: 93% (27 Dec 2024 14:00) (92% - 100%)      12-26-24 @ 07:01  -  12-27-24 @ 07:00  --------------------------------------------------------  IN: 171 mL / OUT: 3250 mL / NET: -3079 mL    12-27-24 @ 07:01  -  12-27-24 @ 14:26  --------------------------------------------------------  IN: 54 mL / OUT: 325 mL / NET: -271 mL                                             --------------------------------------------------------------  LABS:                        12.9   11.31 )-----------( 530      ( 27 Dec 2024 04:59 )             38.0     12-27    134[L]  |  98  |  12  ----------------------------<  280[H]  4.1   |  24  |  0.5[L]    Ca    8.3[L]      27 Dec 2024 04:59  Phos  2.8     12-27  Mg     1.9     12-27    TPro  6.1  /  Alb  2.9[L]  /  TBili  <0.2  /  DBili  x   /  AST  29  /  ALT  29  /  AlkPhos  146[H]  12-27    PTT - ( 27 Dec 2024 04:59 )  PTT:63.1 sec  Urinalysis Basic - ( 27 Dec 2024 04:59 )    Color: x / Appearance: x / SG: x / pH: x  Gluc: 280 mg/dL / Ketone: x  / Bili: x / Urobili: x   Blood: x / Protein: x / Nitrite: x   Leuk Esterase: x / RBC: x / WBC x   Sq Epi: x / Non Sq Epi: x / Bacteria: x      ABG - ( 27 Dec 2024 12:15 )  pH, Arterial: 7.52  pH, Blood: x     /  pCO2: 34    /  pO2: 160   / HCO3: 28    / Base Excess: 5.1   /  SaO2: 99.0              Sedimentation Rate, Erythrocyte: 120 mm/Hr *H* (12-26-24 @ 22:35)                                                      -------------------------------------------------------------  RADIOLOGY:  CXR (12.27.24 @ 07:02)   Slightly less prominent right-sided opacities. Otherwise, extensive   bilateral opacities.                                                --------------------------------------------------------------    PHYSICAL EXAM:   GENERAL: NAD, lying in bed comfortably  HEAD:  Atraumatic, normocephalic  EYES: EOMI, PERRL  HEART: Regular rate and rhythm  LUNGS: No increased work of breathing. Some coarse crackles b/l. No wheeze.  ABDOMEN: Soft, nontender, nondistended  EXTREMITIES: trace b/l edema  NERVOUS SYSTEM: A&Ox3, moving all extremities, no focal deficits                                           --------------------------------------------------------------

## 2024-12-27 NOTE — H&P ADULT - NSHPPHYSICALEXAM_GEN_ALL_CORE
GENERAL: NAD, lying in bed comfortably  HEAD:  Atraumatic, normocephalic  EYES: EOMI, PERRL  HEART: Regular rate and rhythm  LUNGS: No increased work of breathing. Some coarse crackles b/l. No wheeze.  ABDOMEN: Soft, nontender, nondistended  EXTREMITIES: trace b/l edema  NERVOUS SYSTEM: A&Ox3, moving all extremities, no focal deficits

## 2024-12-27 NOTE — H&P ADULT - ATTENDING COMMENTS
No cardiac history.    Multiple comorbidities as above.  Notably, longstanding DM (on insulin).    Late presentation anterior MI (q-waves / markedly elevated troponin)  Antecedent systemic symptoms / COVID positive    Hemodynamics stable  Volume overload  Acute hypoxic respiratory failure / BiPAP intolerant / initially on NRB    Initial CXR demonstrated bilateral opacities with cephalization and hilar infiltrates without significant effusions (acute pulmonary edema v. viral PNA).  Concern for ARS,    Good response to IV Lasix with 4L diuresis and improvement in hypoxia (weaned to 5L NC).    ECHO demonstrates severe anterior hypokinesis.  LV thrombus.  Mild MR.    IMPRESSION:  1. Late presentation anterior MI    2. Ischemic cardiomyopathy (severe LV dysfunction)    3. Acute decompensated systolic CHF (volume overload)    4. COVID 19 infection (onset likely > 5-days)    5. Acute hypoxic respirator failure (secondary to above)    6. IDDM    PLAN:  Dual-antiplatelet therapy  Heparin for LV thrombus  Continue LV Lasix  Continue nitrate  Wean O2 as tolerated  CT chest if CXR / hypoxia do not improve with diuresis.  Cath when compensated

## 2024-12-27 NOTE — PATIENT PROFILE ADULT - FLU SEASON?
PAST MEDICAL HISTORY:  Anxiety and depression     Cerebrovascular accident (CVA) Multiple    CHF (congestive heart failure)     CKD (chronic kidney disease), stage II     COPD, severe     Hyperlipidemia     Hypertension     Type 2 diabetes mellitus      Yes...

## 2024-12-27 NOTE — H&P ADULT - HISTORY OF PRESENT ILLNESS
Triage VS: /98, , RR 26, SpO2 92% on RA, T 98.6F.  Labs: WBC 11.77k, D-dimer 543, CRP 91, HS troponin 400, pro-BNP 4126.  UA w/ 100 protein, 500 glucose, otherwise negative.  SARS-CoV-2 positive.  CXR w/ bilateral opacities.    ED interventions: ASA 325mg, ticagrelor 180mg, DuoNeb, ketorolac, 125mg IV methylprednisolone, 80mg IV furosemide. 44M with HTN, IDDM, HLD, active smoker (1/2 PPD x 28 yrs), hepatitis B (s/p treatment) presents with CC of chest pressure and SOB. 2 weeks ago, he started having cough w/ yellow and white sputum runny nose, generalized body aches. Then started developing REGAN about a week ago along with left sided chest pressure, initially about 6-7/10. The chest pressure/SOB are worse with laying flat, improve with leaning forward. No nausea vomiting, reports loose BM 1-2 per day x 2 weeks. Reports burning at end of urination about a week ago which has resolved.  His last alcoholic drink was 3 weeks ago. Also uses Timothy Dust occasionally, last use few weeks ago. Denies any injected drugs or cocaine.  Has not taken his meds including insulin in the last 2 weeks due to feeling sick and not eating much.    Triage VS: /98, , RR 26, SpO2 92% on RA, T 98.6F.  Labs: WBC 11.77k, D-dimer 543, CRP 91, HS troponin 400, pro-BNP 4126.  UA w/ 100 protein, 500 glucose, otherwise negative.  SARS-CoV-2 positive.  CXR w/ bilateral opacities.    ED interventions: ASA 325mg, ticagrelor 180mg, DuoNeb, ketorolac, 125mg IV methylprednisolone, 80mg IV furosemide. Was also placed on BIPAP which he did not tolerate (made him very anxious), despite repeated counseling.

## 2024-12-27 NOTE — CONSULT NOTE ADULT - ASSESSMENT
ASSESSMENT  44M with HTN, IDDM, HLD, active smoker (1/2 PPD x 28 yrs), hepatitis B (s/p treatment) presents with CC of chest pressure and SOB.      IMPRESSION  #ACS  #Acute Hypoxemic Respiratory failure  #Pulmonary Edema  #COVID-19, recent infection   #DM II  #Hx of Hep B     #Obesity BMI (kg/m2): 32.2  #Abx allergy: No Known Drug Allergies    RECOMMENDATIONS  - COVID-19 positive, although likely from recent infection with URI symptoms 2 weeks ago; not much benefit for RDV   - CXR with bilateral opacities, likely pulmonary edema rather than severe COVIN   - ongoing diuresis and work-up of ACS by primary   - agree with monitoring off antibiotics    Please call or message on [employer] Teams if with any questions.  Spectra [phone]

## 2024-12-27 NOTE — CONSULT NOTE ADULT - SUBJECTIVE AND OBJECTIVE BOX
SARAH FARMER  44y, Male  Allergy: Seafood (Urticaria)  No Known Drug Allergies  shellfish (Urticaria; Rash; Short breath)      CHIEF COMPLAINT:     LOS  1d    HPI:  44M with HTN, IDDM, HLD, active smoker (1/2 PPD x 28 yrs), hepatitis B (s/p treatment) presents with CC of chest pressure and SOB. 2 weeks ago, he started having cough w/ yellow and white sputum runny nose, generalized body aches. Then started developing REGAN about a week ago along with left sided chest pressure, initially about 6-7/10. The chest pressure/SOB are worse with laying flat, improve with leaning forward. No nausea vomiting, reports loose BM 1-2 per day x 2 weeks. Reports burning at end of urination about a week ago which has resolved.  His last alcoholic drink was 3 weeks ago. Also uses Timothy Dust occasionally, last use few weeks ago. Denies any injected drugs or cocaine.  Has not taken his meds including insulin in the last 2 weeks due to feeling sick and not eating much.    Triage VS: /98, , RR 26, SpO2 92% on RA, T 98.6F.  Labs: WBC 11.77k, D-dimer 543, CRP 91, HS troponin 400, pro-BNP 4126.  UA w/ 100 protein, 500 glucose, otherwise negative.  SARS-CoV-2 positive.  CXR w/ bilateral opacities.    ED interventions: ASA 325mg, ticagrelor 180mg, DuoNeb, ketorolac, 125mg IV methylprednisolone, 80mg IV furosemide. Was also placed on BIPAP which he did not tolerate (made him very anxious), despite repeated counseling. (27 Dec 2024 00:27)      INFECTIOUS DISEASE HISTORY:  History as above.  Reports having Flu/Cold like symptoms about 2 weeks ago.   Started to feel better, but then started to develop chest pain and worsening REGAN 1 week ago.   Found to be COVID positive here  Currently on non-rebreather     PAST MEDICAL & SURGICAL HISTORY:  HTN (hypertension)      Hepatitis B      DM (diabetes mellitus)          FAMILY HISTORY  Family history reviewed and non-contributory      SOCIAL HISTORY  Social History:  as above  Recent PCP use   Active Smoker     ROS  General: Denies rigors, nightsweats  HEENT: Denies headache, rhinorrhea, sore throat, eye pain  CV: Denies CP, palpitations  PULM: Denies wheezing, hemoptysis  GI: Denies hematemesis, hematochezia, melena  : Denies discharge, hematuria  MSK: Denies arthralgias, myalgias  SKIN: Denies rash, lesions  NEURO: Denies paresthesias, weakness  PSYCH: Denies depression, anxiety    VITALS:  T(F): 97.1, Max: 98.6 (12-26-24 @ 21:15)  HR: 97  BP: 122/79  RR: 28Vital Signs Last 24 Hrs  T(C): 36.2 (27 Dec 2024 08:00), Max: 37 (26 Dec 2024 21:15)  T(F): 97.1 (27 Dec 2024 08:00), Max: 98.6 (26 Dec 2024 21:15)  HR: 97 (27 Dec 2024 08:00) (58 - 110)  BP: 122/79 (27 Dec 2024 08:00) (115/71 - 148/114)  BP(mean): 95 (27 Dec 2024 08:00) (88 - 128)  RR: 28 (27 Dec 2024 08:00) (26 - 45)  SpO2: 97% (27 Dec 2024 08:00) (92% - 100%)    Parameters below as of 27 Dec 2024 08:00  Patient On (Oxygen Delivery Method): mask, nonrebreather  O2 Flow (L/min): 15      PHYSICAL EXAM:  Gen: NAD, resting in bed  HEENT: Normocephalic, atraumatic  Neck: supple, no lymphadenopathy  CV: Regular rate & regular rhythm  Lungs: decreased BS at bases, no fremitus; crackles at basis   Abdomen: Soft, BS present  Ext: Warm, well perfused  Neuro: non focal, awake  Skin: no rash, no erythema  Lines: no phlebitis    TESTS & MEASUREMENTS:                        12.9   11.31 )-----------( 530      ( 27 Dec 2024 04:59 )             38.0     12-27    134[L]  |  98  |  12  ----------------------------<  280[H]  4.1   |  24  |  0.5[L]    Ca    8.3[L]      27 Dec 2024 04:59  Phos  2.8     12-27  Mg     1.9     12-27    TPro  6.1  /  Alb  2.9[L]  /  TBili  <0.2  /  DBili  x   /  AST  29  /  ALT  29  /  AlkPhos  146[H]  12-27      LIVER FUNCTIONS - ( 27 Dec 2024 04:59 )  Alb: 2.9 g/dL / Pro: 6.1 g/dL / ALK PHOS: 146 U/L / ALT: 29 U/L / AST: 29 U/L / GGT: x           Urinalysis Basic - ( 27 Dec 2024 04:59 )    Color: x / Appearance: x / SG: x / pH: x  Gluc: 280 mg/dL / Ketone: x  / Bili: x / Urobili: x   Blood: x / Protein: x / Nitrite: x   Leuk Esterase: x / RBC: x / WBC x   Sq Epi: x / Non Sq Epi: x / Bacteria: x          Blood Gas Venous - Lactate: 1.6 mmol/L (12-26-24 @ 22:20)      INFECTIOUS DISEASES TESTING      RADIOLOGY & ADDITIONAL TESTS:  I have personally reviewed the last Chest xray  CXR  Xray Chest 1 View- PORTABLE-Urgent:   ACC: [id number] EXAM:  XR CHEST PORTABLE URGENT 1V   ORDERED BY: SALMA GUTIERREZ     PROCEDURE DATE:  12/26/2024          INTERPRETATION:  CLINICAL HISTORY: Shortness of breath.    COMPARISON: April 6, 2023.    TECHNIQUE: Portable frontal chest radiograph. Low lung volume.    FINDINGS:    Support devices: None.    Cardiac/mediastinum/hilum: Stable.    Lung parenchyma/Pleura: Bilateral opacities. No pneumothorax is seen.    Skeleton/soft tissues: Stable.      IMPRESSION: Low lung volume.    Bilateral opacities.    --- End of Report ---            KEL GARCIA MD; Attending Radiologist  This document has been electronically signed. Dec 26 2024 10:25PM (12-26-24 @ 22:17)      CT      CARDIOLOGY TESTING      MEDICATIONS  aspirin  chewable 81 Oral daily  chlorhexidine 2% Cloths 1 Topical daily  dextrose 5%. 1000 IV Continuous <Continuous>  dextrose 5%. 1000 IV Continuous <Continuous>  dextrose 50% Injectable 25 IV Push once  dextrose 50% Injectable 12.5 IV Push once  dextrose 50% Injectable 25 IV Push once  glucagon  Injectable 1 IntraMuscular once  heparin  Infusion.  IV Continuous <Continuous>  insulin regular Infusion 5 IV Continuous <Continuous>  isosorbide   dinitrate Tablet (ISORDIL) 10 Oral three times a day  nicotine -  14 mG/24Hr(s) Patch 1 Transdermal every 24 hours  pantoprazole  Injectable 40 IV Push with breakfast  ticagrelor 90 Oral every 12 hours      Weight  Weight (kg): 96 (12-27-24 @ 01:00)    ANTIBIOTICS:      ALLERGIES:  Seafood (Urticaria)  No Known Drug Allergies  shellfish (Urticaria; Rash; Short breath)

## 2024-12-28 LAB
ALBUMIN SERPL ELPH-MCNC: 2.9 G/DL — LOW (ref 3.5–5.2)
ALBUMIN SERPL ELPH-MCNC: 2.9 G/DL — LOW (ref 3.5–5.2)
ALP SERPL-CCNC: 146 U/L — HIGH (ref 30–115)
ALP SERPL-CCNC: 150 U/L — HIGH (ref 30–115)
ALT FLD-CCNC: 34 U/L — SIGNIFICANT CHANGE UP (ref 0–41)
ALT FLD-CCNC: 35 U/L — SIGNIFICANT CHANGE UP (ref 0–41)
ANION GAP SERPL CALC-SCNC: 14 MMOL/L — SIGNIFICANT CHANGE UP (ref 7–14)
ANION GAP SERPL CALC-SCNC: 15 MMOL/L — HIGH (ref 7–14)
APTT BLD: 63 SEC — HIGH (ref 27–39.2)
APTT BLD: 67.7 SEC — HIGH (ref 27–39.2)
AST SERPL-CCNC: 38 U/L — SIGNIFICANT CHANGE UP (ref 0–41)
AST SERPL-CCNC: 44 U/L — HIGH (ref 0–41)
BASOPHILS # BLD AUTO: 0.03 K/UL — SIGNIFICANT CHANGE UP (ref 0–0.2)
BASOPHILS # BLD AUTO: 0.05 K/UL — SIGNIFICANT CHANGE UP (ref 0–0.2)
BASOPHILS NFR BLD AUTO: 0.2 % — SIGNIFICANT CHANGE UP (ref 0–1)
BASOPHILS NFR BLD AUTO: 0.3 % — SIGNIFICANT CHANGE UP (ref 0–1)
BILIRUB SERPL-MCNC: <0.2 MG/DL — SIGNIFICANT CHANGE UP (ref 0.2–1.2)
BILIRUB SERPL-MCNC: <0.2 MG/DL — SIGNIFICANT CHANGE UP (ref 0.2–1.2)
BUN SERPL-MCNC: 18 MG/DL — SIGNIFICANT CHANGE UP (ref 10–20)
BUN SERPL-MCNC: 19 MG/DL — SIGNIFICANT CHANGE UP (ref 10–20)
CALCIUM SERPL-MCNC: 7.8 MG/DL — LOW (ref 8.4–10.5)
CALCIUM SERPL-MCNC: 8 MG/DL — LOW (ref 8.4–10.5)
CHLORIDE SERPL-SCNC: 97 MMOL/L — LOW (ref 98–110)
CHLORIDE SERPL-SCNC: 97 MMOL/L — LOW (ref 98–110)
CO2 SERPL-SCNC: 23 MMOL/L — SIGNIFICANT CHANGE UP (ref 17–32)
CO2 SERPL-SCNC: 23 MMOL/L — SIGNIFICANT CHANGE UP (ref 17–32)
CREAT SERPL-MCNC: 0.8 MG/DL — SIGNIFICANT CHANGE UP (ref 0.7–1.5)
CREAT SERPL-MCNC: 0.8 MG/DL — SIGNIFICANT CHANGE UP (ref 0.7–1.5)
EGFR: 112 ML/MIN/1.73M2 — SIGNIFICANT CHANGE UP
EGFR: 112 ML/MIN/1.73M2 — SIGNIFICANT CHANGE UP
EOSINOPHIL # BLD AUTO: 0.14 K/UL — SIGNIFICANT CHANGE UP (ref 0–0.7)
EOSINOPHIL # BLD AUTO: 0.15 K/UL — SIGNIFICANT CHANGE UP (ref 0–0.7)
EOSINOPHIL NFR BLD AUTO: 0.8 % — SIGNIFICANT CHANGE UP (ref 0–8)
EOSINOPHIL NFR BLD AUTO: 0.9 % — SIGNIFICANT CHANGE UP (ref 0–8)
GAS PNL BLDA: SIGNIFICANT CHANGE UP
GLUCOSE BLDC GLUCOMTR-MCNC: 167 MG/DL — HIGH (ref 70–99)
GLUCOSE BLDC GLUCOMTR-MCNC: 199 MG/DL — HIGH (ref 70–99)
GLUCOSE BLDC GLUCOMTR-MCNC: 217 MG/DL — HIGH (ref 70–99)
GLUCOSE BLDC GLUCOMTR-MCNC: 240 MG/DL — HIGH (ref 70–99)
GLUCOSE BLDC GLUCOMTR-MCNC: 268 MG/DL — HIGH (ref 70–99)
GLUCOSE BLDC GLUCOMTR-MCNC: 399 MG/DL — HIGH (ref 70–99)
GLUCOSE SERPL-MCNC: 416 MG/DL — HIGH (ref 70–99)
GLUCOSE SERPL-MCNC: 436 MG/DL — HIGH (ref 70–99)
HCT VFR BLD CALC: 37 % — LOW (ref 42–52)
HCT VFR BLD CALC: 38.8 % — LOW (ref 42–52)
HGB BLD-MCNC: 12.3 G/DL — LOW (ref 14–18)
HGB BLD-MCNC: 12.6 G/DL — LOW (ref 14–18)
IMM GRANULOCYTES NFR BLD AUTO: 0.6 % — HIGH (ref 0.1–0.3)
IMM GRANULOCYTES NFR BLD AUTO: 0.7 % — HIGH (ref 0.1–0.3)
INR BLD: 1.07 RATIO — SIGNIFICANT CHANGE UP (ref 0.65–1.3)
LYMPHOCYTES # BLD AUTO: 12.6 % — LOW (ref 20.5–51.1)
LYMPHOCYTES # BLD AUTO: 13.7 % — LOW (ref 20.5–51.1)
LYMPHOCYTES # BLD AUTO: 2.22 K/UL — SIGNIFICANT CHANGE UP (ref 1.2–3.4)
LYMPHOCYTES # BLD AUTO: 2.26 K/UL — SIGNIFICANT CHANGE UP (ref 1.2–3.4)
MAGNESIUM SERPL-MCNC: 2.2 MG/DL — SIGNIFICANT CHANGE UP (ref 1.8–2.4)
MAGNESIUM SERPL-MCNC: 2.4 MG/DL — SIGNIFICANT CHANGE UP (ref 1.8–2.4)
MCHC RBC-ENTMCNC: 31 PG — SIGNIFICANT CHANGE UP (ref 27–31)
MCHC RBC-ENTMCNC: 31.4 PG — HIGH (ref 27–31)
MCHC RBC-ENTMCNC: 32.5 G/DL — SIGNIFICANT CHANGE UP (ref 32–37)
MCHC RBC-ENTMCNC: 33.2 G/DL — SIGNIFICANT CHANGE UP (ref 32–37)
MCV RBC AUTO: 94.4 FL — HIGH (ref 80–94)
MCV RBC AUTO: 95.6 FL — HIGH (ref 80–94)
MONOCYTES # BLD AUTO: 1.15 K/UL — HIGH (ref 0.1–0.6)
MONOCYTES # BLD AUTO: 1.16 K/UL — HIGH (ref 0.1–0.6)
MONOCYTES NFR BLD AUTO: 6.6 % — SIGNIFICANT CHANGE UP (ref 1.7–9.3)
MONOCYTES NFR BLD AUTO: 7 % — SIGNIFICANT CHANGE UP (ref 1.7–9.3)
NEUTROPHILS # BLD AUTO: 12.8 K/UL — HIGH (ref 1.4–6.5)
NEUTROPHILS # BLD AUTO: 13.9 K/UL — HIGH (ref 1.4–6.5)
NEUTROPHILS NFR BLD AUTO: 77.4 % — HIGH (ref 42.2–75.2)
NEUTROPHILS NFR BLD AUTO: 79.2 % — HIGH (ref 42.2–75.2)
NRBC # BLD: 0 /100 WBCS — SIGNIFICANT CHANGE UP (ref 0–0)
NRBC # BLD: 0 /100 WBCS — SIGNIFICANT CHANGE UP (ref 0–0)
PLATELET # BLD AUTO: 578 K/UL — HIGH (ref 130–400)
PLATELET # BLD AUTO: 588 K/UL — HIGH (ref 130–400)
PMV BLD: 9.3 FL — SIGNIFICANT CHANGE UP (ref 7.4–10.4)
PMV BLD: 9.6 FL — SIGNIFICANT CHANGE UP (ref 7.4–10.4)
POTASSIUM SERPL-MCNC: 4.5 MMOL/L — SIGNIFICANT CHANGE UP (ref 3.5–5)
POTASSIUM SERPL-MCNC: 4.5 MMOL/L — SIGNIFICANT CHANGE UP (ref 3.5–5)
POTASSIUM SERPL-SCNC: 4.5 MMOL/L — SIGNIFICANT CHANGE UP (ref 3.5–5)
POTASSIUM SERPL-SCNC: 4.5 MMOL/L — SIGNIFICANT CHANGE UP (ref 3.5–5)
PROT SERPL-MCNC: 6 G/DL — SIGNIFICANT CHANGE UP (ref 6–8)
PROT SERPL-MCNC: 6.2 G/DL — SIGNIFICANT CHANGE UP (ref 6–8)
PROTHROM AB SERPL-ACNC: 12.7 SEC — SIGNIFICANT CHANGE UP (ref 9.95–12.87)
RBC # BLD: 3.92 M/UL — LOW (ref 4.7–6.1)
RBC # BLD: 4.06 M/UL — LOW (ref 4.7–6.1)
RBC # FLD: 12.5 % — SIGNIFICANT CHANGE UP (ref 11.5–14.5)
RBC # FLD: 12.5 % — SIGNIFICANT CHANGE UP (ref 11.5–14.5)
SODIUM SERPL-SCNC: 134 MMOL/L — LOW (ref 135–146)
SODIUM SERPL-SCNC: 135 MMOL/L — SIGNIFICANT CHANGE UP (ref 135–146)
TROPONIN T, HIGH SENSITIVITY RESULT: 410 NG/L — CRITICAL HIGH (ref 6–21)
WBC # BLD: 16.53 K/UL — HIGH (ref 4.8–10.8)
WBC # BLD: 17.55 K/UL — HIGH (ref 4.8–10.8)
WBC # FLD AUTO: 16.53 K/UL — HIGH (ref 4.8–10.8)
WBC # FLD AUTO: 17.55 K/UL — HIGH (ref 4.8–10.8)

## 2024-12-28 PROCEDURE — 93010 ELECTROCARDIOGRAM REPORT: CPT

## 2024-12-28 PROCEDURE — 99291 CRITICAL CARE FIRST HOUR: CPT

## 2024-12-28 PROCEDURE — 71045 X-RAY EXAM CHEST 1 VIEW: CPT | Mod: 26

## 2024-12-28 PROCEDURE — 71250 CT THORAX DX C-: CPT | Mod: 26

## 2024-12-28 RX ORDER — AMIODARONE HYDROCHLORIDE 200 MG/1
150 TABLET ORAL ONCE
Refills: 0 | Status: COMPLETED | OUTPATIENT
Start: 2024-12-28 | End: 2024-12-28

## 2024-12-28 RX ORDER — METOPROLOL TARTRATE 50 MG
12.5 TABLET ORAL EVERY 6 HOURS
Refills: 0 | Status: DISCONTINUED | OUTPATIENT
Start: 2024-12-28 | End: 2025-01-10

## 2024-12-28 RX ORDER — INSULIN HUMAN 100 [IU]/ML
5 INJECTION, SOLUTION SUBCUTANEOUS
Qty: 100 | Refills: 0 | Status: DISCONTINUED | OUTPATIENT
Start: 2024-12-28 | End: 2024-12-28

## 2024-12-28 RX ORDER — INSULIN HUMAN 100 [IU]/ML
1 INJECTION, SOLUTION SUBCUTANEOUS ONCE
Refills: 0 | Status: DISCONTINUED | OUTPATIENT
Start: 2024-12-28 | End: 2024-12-28

## 2024-12-28 RX ORDER — AMIODARONE HYDROCHLORIDE 200 MG/1
0.5 TABLET ORAL
Qty: 450 | Refills: 0 | Status: DISCONTINUED | OUTPATIENT
Start: 2024-12-28 | End: 2024-12-29

## 2024-12-28 RX ORDER — FUROSEMIDE 20 MG
40 TABLET ORAL ONCE
Refills: 0 | Status: COMPLETED | OUTPATIENT
Start: 2024-12-28 | End: 2024-12-28

## 2024-12-28 RX ORDER — LIDOCAINE HYDROCHLORIDE 10 MG/ML
1 INJECTION INFILTRATION; PERINEURAL
Qty: 2 | Refills: 0 | Status: DISCONTINUED | OUTPATIENT
Start: 2024-12-28 | End: 2024-12-29

## 2024-12-28 RX ORDER — INSULIN HUMAN 100 [IU]/ML
5 INJECTION, SOLUTION SUBCUTANEOUS ONCE
Refills: 0 | Status: COMPLETED | OUTPATIENT
Start: 2024-12-28 | End: 2024-12-28

## 2024-12-28 RX ORDER — FUROSEMIDE 20 MG
80 TABLET ORAL ONCE
Refills: 0 | Status: COMPLETED | OUTPATIENT
Start: 2024-12-28 | End: 2024-12-28

## 2024-12-28 RX ORDER — AMIODARONE HYDROCHLORIDE 200 MG/1
1 TABLET ORAL
Qty: 450 | Refills: 0 | Status: DISCONTINUED | OUTPATIENT
Start: 2024-12-28 | End: 2024-12-29

## 2024-12-28 RX ORDER — MORPHINE SULFATE 15 MG
2 TABLET, EXTENDED RELEASE ORAL ONCE
Refills: 0 | Status: DISCONTINUED | OUTPATIENT
Start: 2024-12-28 | End: 2024-12-28

## 2024-12-28 RX ORDER — INSULIN GLARGINE-YFGN 100 [IU]/ML
25 INJECTION, SOLUTION SUBCUTANEOUS AT BEDTIME
Refills: 0 | Status: DISCONTINUED | OUTPATIENT
Start: 2024-12-28 | End: 2024-12-30

## 2024-12-28 RX ORDER — GINKGO BILOBA 40 MG
3 CAPSULE ORAL AT BEDTIME
Refills: 0 | Status: COMPLETED | OUTPATIENT
Start: 2024-12-28 | End: 2024-12-28

## 2024-12-28 RX ORDER — MAGNESIUM SULFATE 500 MG/ML
2 INJECTION, SOLUTION INTRAMUSCULAR; INTRAVENOUS ONCE
Refills: 0 | Status: COMPLETED | OUTPATIENT
Start: 2024-12-28 | End: 2024-12-28

## 2024-12-28 RX ORDER — LIDOCAINE HYDROCHLORIDE 10 MG/ML
100 INJECTION INFILTRATION; PERINEURAL ONCE
Refills: 0 | Status: COMPLETED | OUTPATIENT
Start: 2024-12-28 | End: 2024-12-28

## 2024-12-28 RX ORDER — ACETAMINOPHEN 80 MG/.8ML
650 SOLUTION/ DROPS ORAL ONCE
Refills: 0 | Status: COMPLETED | OUTPATIENT
Start: 2024-12-28 | End: 2024-12-28

## 2024-12-28 RX ORDER — INSULIN GLARGINE-YFGN 100 [IU]/ML
25 INJECTION, SOLUTION SUBCUTANEOUS ONCE
Refills: 0 | Status: COMPLETED | OUTPATIENT
Start: 2024-12-28 | End: 2024-12-28

## 2024-12-28 RX ORDER — INSULIN LISPRO 100/ML
8 VIAL (ML) SUBCUTANEOUS
Refills: 0 | Status: DISCONTINUED | OUTPATIENT
Start: 2024-12-28 | End: 2024-12-30

## 2024-12-28 RX ADMIN — Medication 12.5 MILLIGRAM(S): at 07:37

## 2024-12-28 RX ADMIN — Medication 2: at 12:25

## 2024-12-28 RX ADMIN — LIDOCAINE HYDROCHLORIDE 7.5 MG/MIN: 10 INJECTION INFILTRATION; PERINEURAL at 03:20

## 2024-12-28 RX ADMIN — Medication 12.5 MILLIGRAM(S): at 12:17

## 2024-12-28 RX ADMIN — Medication 3 MILLIGRAM(S): at 21:35

## 2024-12-28 RX ADMIN — Medication 81 MILLIGRAM(S): at 12:17

## 2024-12-28 RX ADMIN — NICOTINE POLACRILEX 1 PATCH: 4 LOZENGE ORAL at 00:09

## 2024-12-28 RX ADMIN — HEPARIN SODIUM 1800 UNIT(S)/HR: 1000 INJECTION, SOLUTION INTRAVENOUS; SUBCUTANEOUS at 03:15

## 2024-12-28 RX ADMIN — PANTOPRAZOLE 40 MILLIGRAM(S): 40 TABLET, DELAYED RELEASE ORAL at 07:37

## 2024-12-28 RX ADMIN — ACETAMINOPHEN 650 MILLIGRAM(S): 80 SOLUTION/ DROPS ORAL at 19:05

## 2024-12-28 RX ADMIN — NICOTINE POLACRILEX 1 PATCH: 4 LOZENGE ORAL at 07:00

## 2024-12-28 RX ADMIN — Medication 2 MILLIGRAM(S): at 02:30

## 2024-12-28 RX ADMIN — TICAGRELOR 90 MILLIGRAM(S): 60 TABLET ORAL at 05:40

## 2024-12-28 RX ADMIN — Medication 10 MILLIGRAM(S): at 05:40

## 2024-12-28 RX ADMIN — Medication 8 UNIT(S): at 18:07

## 2024-12-28 RX ADMIN — INSULIN HUMAN 5 UNIT(S)/HR: 100 INJECTION, SOLUTION SUBCUTANEOUS at 07:34

## 2024-12-28 RX ADMIN — Medication 80 MILLIGRAM(S): at 01:45

## 2024-12-28 RX ADMIN — Medication 10 MILLIGRAM(S): at 12:17

## 2024-12-28 RX ADMIN — Medication 10 MILLIGRAM(S): at 18:06

## 2024-12-28 RX ADMIN — AMIODARONE HYDROCHLORIDE 33.3 MG/MIN: 200 TABLET ORAL at 00:43

## 2024-12-28 RX ADMIN — LIDOCAINE HYDROCHLORIDE 100 MILLIGRAM(S): 10 INJECTION INFILTRATION; PERINEURAL at 02:20

## 2024-12-28 RX ADMIN — INSULIN GLARGINE-YFGN 25 UNIT(S): 100 INJECTION, SOLUTION SUBCUTANEOUS at 21:55

## 2024-12-28 RX ADMIN — ACETAMINOPHEN 650 MILLIGRAM(S): 80 SOLUTION/ DROPS ORAL at 18:34

## 2024-12-28 RX ADMIN — NICOTINE POLACRILEX 1 PATCH: 4 LOZENGE ORAL at 20:48

## 2024-12-28 RX ADMIN — Medication 40 MILLIGRAM(S): at 19:20

## 2024-12-28 RX ADMIN — INSULIN HUMAN 5 UNIT(S): 100 INJECTION, SOLUTION SUBCUTANEOUS at 07:35

## 2024-12-28 RX ADMIN — ACETAMINOPHEN 650 MILLIGRAM(S): 80 SOLUTION/ DROPS ORAL at 00:09

## 2024-12-28 RX ADMIN — NICOTINE POLACRILEX 1 PATCH: 4 LOZENGE ORAL at 23:52

## 2024-12-28 RX ADMIN — CHLORHEXIDINE GLUCONATE 1 APPLICATION(S): 1.2 RINSE ORAL at 12:24

## 2024-12-28 RX ADMIN — MAGNESIUM SULFATE 25 GRAM(S): 500 INJECTION, SOLUTION INTRAMUSCULAR; INTRAVENOUS at 03:32

## 2024-12-28 RX ADMIN — Medication 12.5 MILLIGRAM(S): at 23:13

## 2024-12-28 RX ADMIN — INSULIN GLARGINE-YFGN 25 UNIT(S): 100 INJECTION, SOLUTION SUBCUTANEOUS at 08:21

## 2024-12-28 RX ADMIN — Medication 2: at 18:07

## 2024-12-28 RX ADMIN — Medication 8 UNIT(S): at 12:25

## 2024-12-28 RX ADMIN — Medication 12.5 MILLIGRAM(S): at 18:06

## 2024-12-28 RX ADMIN — Medication 25 MILLIGRAM(S): at 21:55

## 2024-12-28 RX ADMIN — TICAGRELOR 90 MILLIGRAM(S): 60 TABLET ORAL at 18:06

## 2024-12-28 RX ADMIN — AMIODARONE HYDROCHLORIDE 600 MILLIGRAM(S): 200 TABLET ORAL at 00:43

## 2024-12-28 RX ADMIN — Medication 2 MILLIGRAM(S): at 03:14

## 2024-12-28 NOTE — CHART NOTE - NSCHARTNOTEFT_GEN_A_CORE
Patient was found to have periods of NSVT complaining of dizziness and palpitations. Decision was initially made to start patient on amiodarone with loading bolus and then drip. After completing the bolus and being in the first phase of the loading drip patient had sustained vtach to the 140s. With cardio fellow at bedside, lidocaine push administered, followed by drip and then patient was shocked with 220J. Patient converted to sinus after interventions

## 2024-12-28 NOTE — DIETITIAN INITIAL EVALUATION ADULT - NAME AND PHONE
Goal: 1.Continue to consume/tolerate 75% of meals in 7-10 days (Low Risk)  Intervention: 1.Meals and Snacks   Monitor/Evaluate: Diet order, energy intake, nutrition focused physical findings, Na, CL, glucose and anemia profile

## 2024-12-28 NOTE — DIETITIAN INITIAL EVALUATION ADULT - OTHER CALCULATIONS
Estimated Calorie Needs: [id number] x AF 1-1.6=[id number]/day -Due to obesity   Estimated Protein Needs: 70-91grams/day (1-1.3grams/kg of IBW-70kg) -Due to obesity and infection  Estimated Fluid Needs: 1827-2010mL/day (1mL/kcal) vs Fluids per CCU Team

## 2024-12-28 NOTE — CHART NOTE - NSCHARTNOTEFT_GEN_A_CORE
IVC 2 cm   Collapsible    Hold Diuretics IVC 2 cm   Collapsible    CT CHest  ARDS  with pleural effusions b/l    1 dose Lasix 40 mg stat

## 2024-12-28 NOTE — PROGRESS NOTE ADULT - ASSESSMENT
**INCOMPLETE NOTE**     45 YO M M with PMHx of HTN, IDDM, HLD, active smoker (1/2 PPD x 28 yrs), hepatitis B (s/p treatment) presented with CC chest pressure and SOB.    TTE 12/27/24: EF 25-30%. Multiple RWMA. GIIDD. Small, fixed, anteroapical left ventricular thrombus. Right ventricular apical hypokinesis. Mild MR.     IMPRESSION  #Late presentation anterior MI   #Sustained Vtach  #COVID +ve  #HTN  #HLD  #IDDM w/ hyperglycemia  #Active smoker  #Alcohol and drug (Timothy dust) abuse       PLAN    CNS:   - Avoid CNS depressants     HEENT:   - Oral care    PULMONARY:  - Currently on NC 5L.     CARDIOVASCULAR:   - HS Troponin trend: 400   (26 Dec 2024 22:30), 309   (27 Dec 2024 04:59), 356   (27 Dec 2024 17:10), 410   (28 Dec 2024 05:30)  - Continue heparin gtt. Requires long term anticoagulation for LV thrombus   - S/p ASA 325mg and ticagrelor 180mg; continue ASA 81 QD and ticagrelor 90mg BID.  - Isordil 10 TID.   - Lasix 80mg IV 12/28. Monitor UO. 1.1L net -ve in past 24 hours.   - Sustained Vtach overnight on 12/28. Continue lidocaine @ 1mg/min and amiodarone @ 0.5 mg/min    GI:   - Pantoprazole 40mg IV QD.    RENAL:  - Follow up lytes. Correct as needed.    INFECTIOUS DISEASE:  - Monitor off abx.  - CHG 2% daily bath    HEMATOLOGICAL:    - Heparin drip ACS protocol.    ENDOCRINE:    - Continue insulin gtt. Monitor FS. Keep b/w 140 - 180.    MUSCULOSKELETAL:   - AAT    Disposition: CCU **INCOMPLETE NOTE**     43 YO M with PMHx of HTN, IDDM, HLD, active smoker (1/2 PPD x 28 yrs), hepatitis B (s/p treatment) presented with CC chest pressure and SOB.    TTE 12/27/24: EF 25-30%. Multiple RWMA. GIIDD. Small, fixed, anteroapical left ventricular thrombus. Right ventricular apical hypokinesis. Mild MR.     IMPRESSION  #Late presentation anterior MI   #Sustained Vtach  #COVID +ve  #HTN  #HLD  #IDDM w/ hyperglycemia  #Active smoker  #Alcohol and drug (Timothy dust) abuse     PLAN    CNS:   - Avoid CNS depressants     HEENT:   - Oral care    PULMONARY:  - Currently on NC 5L. Morning ABG with predominantly respiratory alkalosis.   - Smoking cessation    CARDIOVASCULAR:   - HS Troponin trend: 400   (26 Dec 2024 22:30), 309   (27 Dec 2024 04:59), 356   (27 Dec 2024 17:10), 410   (28 Dec 2024 05:30)  - Continue heparin gtt. Requires long term anticoagulation for LV thrombus   - S/p ASA 325mg and ticagrelor 180mg; continue ASA 81 QD and ticagrelor 90mg BID.  - Isordil 10 TID. Metoprolol 12.5mg PO Q6  - Lasix 80mg IV 12/28. Monitor UO. 1.1L net -ve in past 24 hours.   - Sustained Vtach overnight on 12/28. Continue lidocaine @ 1mg/min and amiodarone @ 0.5 mg/min. EP evaluation.    GI:   - Pantoprazole 40mg IV QD.    RENAL:  - Follow up lytes. Correct as needed.    INFECTIOUS DISEASE:  - Monitor off abx.  - CHG 2% daily bath.    HEMATOLOGICAL:    - Heparin drip ACS protocol.    ENDOCRINE:    - Continue insulin gtt. Monitor FS. Keep b/w 140 - 180.    MUSCULOSKELETAL:   - AAT    Disposition: CCU 43 YO M with PMHx of HTN, IDDM, HLD, active smoker (1/2 PPD x 28 yrs), hepatitis B (s/p treatment) presented with CC chest pressure and SOB.    TTE 12/27/24: EF 25-30%. Multiple RWMA. GIIDD. Small, fixed, anteroapical left ventricular thrombus. Right ventricular apical hypokinesis. Mild MR.     IMPRESSION  #Late presentation anterior MI   #Sustained Vtach; 12 min overnight on 12/28 with rate ~ 140s  #COVID +ve  #HTN  #HLD  #IDDM w/ hyperglycemia  #Active smoker  #Alcohol and drug (Timothy dust) abuse     PLAN    CNS:   - Avoid CNS depressants     HEENT:   - Oral care    PULMONARY:  - Currently on NC 5L. Morning ABG with predominantly respiratory alkalosis.   - Smoking cessation  - CT chest non-contrast    CARDIOVASCULAR:   - HS Troponin trend: 400   (26 Dec 2024 22:30), 309   (27 Dec 2024 04:59), 356   (27 Dec 2024 17:10), 410   (28 Dec 2024 05:30)  - Continue heparin gtt. Requires long term anticoagulation for LV thrombus   - S/p ASA 325mg and ticagrelor 180mg; continue ASA 81 QD and ticagrelor 90mg BID.  - Isordil 10 TID. Metoprolol 12.5mg PO Q6  - Lasix 80mg IV 12/28. Monitor UO. 1.1L net -ve in past 24 hours. Check IVC.  - Sustained Vtach overnight on 12/28. Continue lidocaine @ 1mg/min and amiodarone @ 0.5 mg/min. EP evaluation.    GI:   - Pantoprazole 40mg IV QD.    RENAL:  - Follow up lytes. Correct as needed.    INFECTIOUS DISEASE:  - Monitor off abx.  - CHG 2% daily bath.    HEMATOLOGICAL:    - Heparin drip ACS protocol.    ENDOCRINE:    - Continue insulin gtt. Monitor FS. Keep b/w 140 - 180.    MUSCULOSKELETAL:   - AAT    Disposition: CCU

## 2024-12-28 NOTE — DIETITIAN INITIAL EVALUATION ADULT - PERTINENT MEDS FT
MEDICATIONS  (STANDING):  aMIOdarone Infusion 1 mG/Min (33.3 mL/Hr) IV Continuous <Continuous>  aMIOdarone Infusion 0.5 mG/Min (16.7 mL/Hr) IV Continuous <Continuous>  aspirin  chewable 81 milliGRAM(s) Oral daily  chlorhexidine 2% Cloths 1 Application(s) Topical daily  dextrose 5%. 1000 milliLiter(s) (50 mL/Hr) IV Continuous <Continuous>  dextrose 5%. 1000 milliLiter(s) (100 mL/Hr) IV Continuous <Continuous>  dextrose 50% Injectable 25 Gram(s) IV Push once  dextrose 50% Injectable 12.5 Gram(s) IV Push once  dextrose 50% Injectable 25 Gram(s) IV Push once  glucagon  Injectable 1 milliGRAM(s) IntraMuscular once  heparin  Infusion.  Unit(s)/Hr (18 mL/Hr) IV Continuous <Continuous>  insulin glargine Injectable (LANTUS) 25 Unit(s) SubCutaneous at bedtime  insulin lispro (ADMELOG) corrective regimen sliding scale   SubCutaneous three times a day before meals  insulin lispro Injectable (ADMELOG) 8 Unit(s) SubCutaneous three times a day before meals  isosorbide   dinitrate Tablet (ISORDIL) 10 milliGRAM(s) Oral three times a day  lidocaine   Infusion 1 mG/Min (7.5 mL/Hr) IV Continuous <Continuous>  metoprolol tartrate 12.5 milliGRAM(s) Oral every 6 hours  nicotine -  14 mG/24Hr(s) Patch 1 Patch Transdermal every 24 hours  pantoprazole  Injectable 40 milliGRAM(s) IV Push with breakfast  ticagrelor 90 milliGRAM(s) Oral every 12 hours    MEDICATIONS  (PRN):  acetaminophen     Tablet .. 650 milliGRAM(s) Oral every 6 hours PRN Mild Pain (1 - 3)  dextrose Oral Gel 15 Gram(s) Oral once PRN Blood Glucose LESS THAN 70 milliGRAM(s)/deciliter  dextrose Oral Gel 15 Gram(s) Oral once PRN Blood Glucose LESS THAN 70 milliGRAM(s)/deciliter  heparin   Injectable 8000 Unit(s) IV Push every 6 hours PRN For aPTT less than 40  heparin   Injectable 4000 Unit(s) IV Push every 6 hours PRN For aPTT between 40 - 57

## 2024-12-28 NOTE — DIETITIAN INITIAL EVALUATION ADULT - NSFNSGIIOFT_GEN_A_CORE
Dx: 43y/o male with h/o HTN, IDDM, HLD, active smoker (1/2 PPD x 28 yrs) and HBV (s/p treatment), presented with CC chest pressure and SOB. S/p TTE (12/27), results show an EF of 25-30%, multiple RWMA, GIIDD, small, fixed, anteroapical left ventricular thrombus, right ventricular apical hypokinesis and mild MR.  Found to have periods of NSVT complaining of dizziness and palpitations. Hospital course is complicated by ACS, acute hypoxemic respiratory failure, pulmonary edema and COVID-19 infection. MAP-[id number].

## 2024-12-28 NOTE — DIETITIAN INITIAL EVALUATION ADULT - LITERATURE/VIDEOS GIVEN
Nutrition education is deferred since the patient is asleep. When/if the patient is awake, nutrition education will be completed.

## 2024-12-28 NOTE — DIETITIAN INITIAL EVALUATION ADULT - REASON
A comprehensive nutrition focused physical examination could not be completed since the patient is asleep. However, at a glance, the patient appears well nourished. When/if the patient is awake, comprehensive nutrition focused physical examination will be reattempted.

## 2024-12-28 NOTE — DIETITIAN INITIAL EVALUATION ADULT - PERTINENT LABORATORY DATA
12-28    134[L]  |  97[L]  |  18  ----------------------------<  436[H]  4.5   |  23  |  0.8    Ca    7.8[L]      28 Dec 2024 05:30  Phos  2.8     12-27  Mg     2.4     12-28    TPro  6.2  /  Alb  2.9[L]  /  TBili  <0.2  /  DBili  x   /  AST  38  /  ALT  35  /  AlkPhos  146[H]  12-28  POCT Blood Glucose.: 167 mg/dL (12-28-24 @ 21:38)  A1C with Estimated Average Glucose Result: 10.8 % (12-27-24 @ 04:59)

## 2024-12-28 NOTE — PROGRESS NOTE ADULT - SUBJECTIVE AND OBJECTIVE BOX
Date of Admission: 24    HISTORY OF PRESENT ILLNESS:    44M with HTN, IDDM, HLD, active smoker (1/2 PPD x 28 yrs), hepatitis B (s/p treatment) presents with CC of chest pressure and SOB. 2 weeks ago, he started having cough w/ yellow and white sputum runny nose, generalized body aches. Then started developing REGAN about a week ago along with left sided chest pressure, initially about 6-7/10. The chest pressure/SOB are worse with laying flat, improve with leaning forward. No nausea vomiting, reports loose BM 1-2 per day x 2 weeks. Reports burning at end of urination about a week ago which has resolved.  His last alcoholic drink was 3 weeks ago. Also uses Timothy Dust occasionally, last use few weeks ago. Denies any injected drugs or cocaine.  Has not taken his meds including insulin in the last 2 weeks due to feeling sick and not eating much.    Triage VS: /98, , RR 26, SpO2 92% on RA, T 98.6F.  Labs: WBC 11.77k, D-dimer 543, CRP 91, HS troponin 400, pro-BNP 4126.  UA w/ 100 protein, 500 glucose, otherwise negative.  SARS-CoV-2 positive.  CXR w/ bilateral opacities.    ED interventions: ASA 325mg, ticagrelor 180mg, DuoNeb, ketorolac, 125mg IV methylprednisolone, 80mg IV furosemide. Was also placed on BIPAP which he did not tolerate (made him very anxious), despite repeated counseling. (27 Dec 2024 00:27)      PAST MEDICAL & SURGICAL HISTORY  HTN (hypertension)    Hepatitis B    DM (diabetes mellitus)      VITALS:  T(C): 36.7 (24 @ 08:00), Max: 36.7 (24 @ 16:00)  HR: 91 (24 @ 08:00) (91 - 146)  BP: 112/70 (24 @ 08:00) (103/65 - 145/80)  RR: 52 (24 @ 08:00) (24 - 52)  SpO2: 98% (24 @ 08:00) (90% - 99%)  Wt(kg): --  Daily     Daily Weight in k (28 Dec 2024 06:00)    PHYSICAL EXAM:  GEN: Not in acute distress  HEENT: EOMI  LUNGS: b/l crackles   CARDIOVASCULAR: RRR, S1/S2 present  ABD: Soft, non-tender, non-distended  EXT: No RADHA  SKIN: Warm  NEURO: AAOx3    LABS:	 	                        12.6   16.53 )-----------( 588      ( 28 Dec 2024 05:30 )             38.8         134[L]  |  97[L]  |  18  ----------------------------<  436[H]  4.5   |  23  |  0.8    Ca    7.8[L]      28 Dec 2024 05:30  Phos  2.8       Mg     2.4         TPro  6.2  /  Alb  2.9[L]  /  TBili  <0.2  /  DBili  x   /  AST  38  /  ALT  35  /  AlkPhos  146[H]          PT/INR - ( 28 Dec 2024 02:17 )   PT: 12.70 sec;   INR: 1.07 ratio         PTT - ( 28 Dec 2024 02:17 )  PTT:63.0 sec    Intake and Output:    24 @ 07:01  -  24 @ 07:00  --------------------------------------------------------  IN: 1077.7 mL / OUT: 2150 mL / NET: -1072.3 mL        CARDIAC MARKERS:  Troponin T, High Sensitivity Result: 410 ng/L (24 @ 05:30)  Troponin T, High Sensitivity Result: 356 ng/L (24 @ 17:10)  Troponin T, High Sensitivity Result: 309 ng/L (24 @ 04:59)  Troponin T, High Sensitivity Result: 400 ng/L (24 @ 22:30)      TELEMETRY EVENTS:    ECG:    RADIOLOGY:    OTHER:    Echocardiogram:  < from: TTE Echo Complete w/ Contrast w/o Doppler (24 @ 11:20) >   1. Endocardial visualization was enhanced with intravenous echo contrast.   2. Left ventricular ejection fraction, by visual estimation, is 25 to   30%.  3. Severely decreased global left ventricular systolic function.   4. Multiple left ventricular regional wall motion abnormalities exist.   See wall motion findings.   5. Spectral Doppler shows pseudonormal pattern of left ventricular   myocardial filling (Grade II diastolic dysfunction).   6. Small, fixed, anteroapical left ventricular thrombus.   7. Right ventricular apical hypokinesis.   8. Mild mitral regurgitation.   9. Findings discussed with CCU team.        < end of copied text >    Catheterization:    Stress Test: 	    Home Medications:  atorvastatin 40 mg oral tablet: 1 tab(s) orally once a day (at bedtime) (27 Dec 2024 14:48)  Insulin Glargine: 85 unit(s) subcutaneous once a day (27 Dec 2024 14:52)  Insulin Lispro: 40 unit(s) subcutaneous once a day (27 Dec 2024 14:57)  Jardiance 25 mg oral tablet: 1 tab(s) orally once a day (27 Dec 2024 14:59)  losartan 25 mg oral tablet: 1 tab(s) orally once a day (27 Dec 2024 14:59)  metFORMIN 1000 mg oral tablet: 1 tab(s) orally 2 times a day (27 Dec 2024 14:59)  Ozempic 2 mg/1.5 mL (1 mg dose) subcutaneous solution: 2 milligram(s) subcutaneous once a week (27 Dec 2024 14:59)    MEDICATIONS  (STANDING):  aMIOdarone Infusion 1 mG/Min (33.3 mL/Hr) IV Continuous <Continuous>  aMIOdarone Infusion 0.5 mG/Min (16.7 mL/Hr) IV Continuous <Continuous>  aspirin  chewable 81 milliGRAM(s) Oral daily  chlorhexidine 2% Cloths 1 Application(s) Topical daily  dextrose 5%. 1000 milliLiter(s) (50 mL/Hr) IV Continuous <Continuous>  dextrose 5%. 1000 milliLiter(s) (100 mL/Hr) IV Continuous <Continuous>  dextrose 50% Injectable 25 Gram(s) IV Push once  dextrose 50% Injectable 12.5 Gram(s) IV Push once  dextrose 50% Injectable 25 Gram(s) IV Push once  glucagon  Injectable 1 milliGRAM(s) IntraMuscular once  heparin  Infusion.  Unit(s)/Hr (18 mL/Hr) IV Continuous <Continuous>  insulin glargine Injectable (LANTUS) 25 Unit(s) SubCutaneous at bedtime  insulin lispro (ADMELOG) corrective regimen sliding scale   SubCutaneous three times a day before meals  insulin lispro Injectable (ADMELOG) 8 Unit(s) SubCutaneous three times a day before meals  insulin regular Infusion 5 Unit(s)/Hr (5 mL/Hr) IV Continuous <Continuous>  isosorbide   dinitrate Tablet (ISORDIL) 10 milliGRAM(s) Oral three times a day  lidocaine   Infusion 1 mG/Min (7.5 mL/Hr) IV Continuous <Continuous>  metoprolol tartrate 12.5 milliGRAM(s) Oral every 6 hours  nicotine -  14 mG/24Hr(s) Patch 1 Patch Transdermal every 24 hours  pantoprazole  Injectable 40 milliGRAM(s) IV Push with breakfast  ticagrelor 90 milliGRAM(s) Oral every 12 hours    MEDICATIONS  (PRN):  acetaminophen     Tablet .. 650 milliGRAM(s) Oral every 6 hours PRN Mild Pain (1 - 3)  dextrose Oral Gel 15 Gram(s) Oral once PRN Blood Glucose LESS THAN 70 milliGRAM(s)/deciliter  dextrose Oral Gel 15 Gram(s) Oral once PRN Blood Glucose LESS THAN 70 milliGRAM(s)/deciliter  heparin   Injectable 8000 Unit(s) IV Push every 6 hours PRN For aPTT less than 40  heparin   Injectable 4000 Unit(s) IV Push every 6 hours PRN For aPTT between 40 - 57         Date of Admission: 24    HISTORY OF PRESENT ILLNESS:    44M with HTN, IDDM, HLD, active smoker (1/2 PPD x 28 yrs), hepatitis B (s/p treatment) presents with CC of chest pressure and SOB. 2 weeks ago, he started having cough w/ yellow and white sputum runny nose, generalized body aches. Then started developing REGAN about a week ago along with left sided chest pressure, initially about 6-7/10. The chest pressure/SOB are worse with laying flat, improve with leaning forward. No nausea vomiting, reports loose BM 1-2 per day x 2 weeks. Reports burning at end of urination about a week ago which has resolved.  His last alcoholic drink was 3 weeks ago. Also uses Timothy Dust occasionally, last use few weeks ago. Denies any injected drugs or cocaine.  Has not taken his meds including insulin in the last 2 weeks due to feeling sick and not eating much.    Triage VS: /98, , RR 26, SpO2 92% on RA, T 98.6F.  Labs: WBC 11.77k, D-dimer 543, CRP 91, HS troponin 400, pro-BNP 4126.  UA w/ 100 protein, 500 glucose, otherwise negative.  SARS-CoV-2 positive.  CXR w/ bilateral opacities.    ED interventions: ASA 325mg, ticagrelor 180mg, DuoNeb, ketorolac, 125mg IV methylprednisolone, 80mg IV furosemide. Was also placed on BIPAP which he did not tolerate (made him very anxious), despite repeated counseling. (27 Dec 2024 00:27)      PAST MEDICAL & SURGICAL HISTORY  HTN (hypertension)    Hepatitis B    DM (diabetes mellitus)      VITALS:  T(C): 36.7 (24 @ 08:00), Max: 36.7 (24 @ 16:00)  HR: 91 (24 @ 08:00) (91 - 146)  BP: 112/70 (24 @ 08:00) (103/65 - 145/80)  RR: 52 (24 @ 08:00) (24 - 52)  SpO2: 98% (24 @ 08:00) (90% - 99%)  Wt(kg): --  Daily     Daily Weight in k (28 Dec 2024 06:00)    PHYSICAL EXAM:  GEN: Mild distress  HEENT: EOMI  LUNGS: b/l crackles   CARDIOVASCULAR: RRR, S1/S2 present  ABD: Soft, non-tender, non-distended  EXT: No RADHA  SKIN: Warm  NEURO: AAOx3    LABS:	 	                        12.6   16.53 )-----------( 588      ( 28 Dec 2024 05:30 )             38.8         134[L]  |  97[L]  |  18  ----------------------------<  436[H]  4.5   |  23  |  0.8    Ca    7.8[L]      28 Dec 2024 05:30  Phos  2.8       Mg     2.4         TPro  6.2  /  Alb  2.9[L]  /  TBili  <0.2  /  DBili  x   /  AST  38  /  ALT  35  /  AlkPhos  146[H]          PT/INR - ( 28 Dec 2024 02:17 )   PT: 12.70 sec;   INR: 1.07 ratio         PTT - ( 28 Dec 2024 02:17 )  PTT:63.0 sec    Intake and Output:    24 @ 07:01  -  24 @ 07:00  --------------------------------------------------------  IN: 1077.7 mL / OUT: 2150 mL / NET: -1072.3 mL        CARDIAC MARKERS:  Troponin T, High Sensitivity Result: 410 ng/L (24 @ 05:30)  Troponin T, High Sensitivity Result: 356 ng/L (24 @ 17:10)  Troponin T, High Sensitivity Result: 309 ng/L (24 @ 04:59)  Troponin T, High Sensitivity Result: 400 ng/L (24 @ 22:30)      TELEMETRY EVENTS:    ECG:    RADIOLOGY:    OTHER:    Echocardiogram:  < from: TTE Echo Complete w/ Contrast w/o Doppler (24 @ 11:20) >   1. Endocardial visualization was enhanced with intravenous echo contrast.   2. Left ventricular ejection fraction, by visual estimation, is 25 to   30%.  3. Severely decreased global left ventricular systolic function.   4. Multiple left ventricular regional wall motion abnormalities exist.   See wall motion findings.   5. Spectral Doppler shows pseudonormal pattern of left ventricular   myocardial filling (Grade II diastolic dysfunction).   6. Small, fixed, anteroapical left ventricular thrombus.   7. Right ventricular apical hypokinesis.   8. Mild mitral regurgitation.   9. Findings discussed with CCU team.        < end of copied text >    Catheterization:    Stress Test: 	    Home Medications:  atorvastatin 40 mg oral tablet: 1 tab(s) orally once a day (at bedtime) (27 Dec 2024 14:48)  Insulin Glargine: 85 unit(s) subcutaneous once a day (27 Dec 2024 14:52)  Insulin Lispro: 40 unit(s) subcutaneous once a day (27 Dec 2024 14:57)  Jardiance 25 mg oral tablet: 1 tab(s) orally once a day (27 Dec 2024 14:59)  losartan 25 mg oral tablet: 1 tab(s) orally once a day (27 Dec 2024 14:59)  metFORMIN 1000 mg oral tablet: 1 tab(s) orally 2 times a day (27 Dec 2024 14:59)  Ozempic 2 mg/1.5 mL (1 mg dose) subcutaneous solution: 2 milligram(s) subcutaneous once a week (27 Dec 2024 14:59)    MEDICATIONS  (STANDING):  aMIOdarone Infusion 1 mG/Min (33.3 mL/Hr) IV Continuous <Continuous>  aMIOdarone Infusion 0.5 mG/Min (16.7 mL/Hr) IV Continuous <Continuous>  aspirin  chewable 81 milliGRAM(s) Oral daily  chlorhexidine 2% Cloths 1 Application(s) Topical daily  dextrose 5%. 1000 milliLiter(s) (50 mL/Hr) IV Continuous <Continuous>  dextrose 5%. 1000 milliLiter(s) (100 mL/Hr) IV Continuous <Continuous>  dextrose 50% Injectable 25 Gram(s) IV Push once  dextrose 50% Injectable 12.5 Gram(s) IV Push once  dextrose 50% Injectable 25 Gram(s) IV Push once  glucagon  Injectable 1 milliGRAM(s) IntraMuscular once  heparin  Infusion.  Unit(s)/Hr (18 mL/Hr) IV Continuous <Continuous>  insulin glargine Injectable (LANTUS) 25 Unit(s) SubCutaneous at bedtime  insulin lispro (ADMELOG) corrective regimen sliding scale   SubCutaneous three times a day before meals  insulin lispro Injectable (ADMELOG) 8 Unit(s) SubCutaneous three times a day before meals  insulin regular Infusion 5 Unit(s)/Hr (5 mL/Hr) IV Continuous <Continuous>  isosorbide   dinitrate Tablet (ISORDIL) 10 milliGRAM(s) Oral three times a day  lidocaine   Infusion 1 mG/Min (7.5 mL/Hr) IV Continuous <Continuous>  metoprolol tartrate 12.5 milliGRAM(s) Oral every 6 hours  nicotine -  14 mG/24Hr(s) Patch 1 Patch Transdermal every 24 hours  pantoprazole  Injectable 40 milliGRAM(s) IV Push with breakfast  ticagrelor 90 milliGRAM(s) Oral every 12 hours    MEDICATIONS  (PRN):  acetaminophen     Tablet .. 650 milliGRAM(s) Oral every 6 hours PRN Mild Pain (1 - 3)  dextrose Oral Gel 15 Gram(s) Oral once PRN Blood Glucose LESS THAN 70 milliGRAM(s)/deciliter  dextrose Oral Gel 15 Gram(s) Oral once PRN Blood Glucose LESS THAN 70 milliGRAM(s)/deciliter  heparin   Injectable 8000 Unit(s) IV Push every 6 hours PRN For aPTT less than 40  heparin   Injectable 4000 Unit(s) IV Push every 6 hours PRN For aPTT between 40 - 57

## 2024-12-28 NOTE — DIETITIAN INITIAL EVALUATION ADULT - ORAL INTAKE PTA/DIET HISTORY
An interview with the patient could not be completed at this time since they are asleep. When/if the patient is awake, an interview with the patient will be completed.

## 2024-12-29 LAB
ABO RH CONFIRMATION: SIGNIFICANT CHANGE UP
ALBUMIN SERPL ELPH-MCNC: 2.7 G/DL — LOW (ref 3.5–5.2)
ALP SERPL-CCNC: 133 U/L — HIGH (ref 30–115)
ALT FLD-CCNC: 28 U/L — SIGNIFICANT CHANGE UP (ref 0–41)
ANION GAP SERPL CALC-SCNC: 13 MMOL/L — SIGNIFICANT CHANGE UP (ref 7–14)
APTT BLD: 94.1 SEC — CRITICAL HIGH (ref 27–39.2)
AST SERPL-CCNC: 24 U/L — SIGNIFICANT CHANGE UP (ref 0–41)
BASOPHILS # BLD AUTO: 0.06 K/UL — SIGNIFICANT CHANGE UP (ref 0–0.2)
BASOPHILS NFR BLD AUTO: 0.4 % — SIGNIFICANT CHANGE UP (ref 0–1)
BILIRUB SERPL-MCNC: 0.3 MG/DL — SIGNIFICANT CHANGE UP (ref 0.2–1.2)
BUN SERPL-MCNC: 19 MG/DL — SIGNIFICANT CHANGE UP (ref 10–20)
CALCIUM SERPL-MCNC: 8 MG/DL — LOW (ref 8.4–10.4)
CHLORIDE SERPL-SCNC: 95 MMOL/L — LOW (ref 98–110)
CO2 SERPL-SCNC: 25 MMOL/L — SIGNIFICANT CHANGE UP (ref 17–32)
CREAT SERPL-MCNC: 0.7 MG/DL — SIGNIFICANT CHANGE UP (ref 0.7–1.5)
EGFR: 117 ML/MIN/1.73M2 — SIGNIFICANT CHANGE UP
EOSINOPHIL # BLD AUTO: 0.44 K/UL — SIGNIFICANT CHANGE UP (ref 0–0.7)
EOSINOPHIL NFR BLD AUTO: 3 % — SIGNIFICANT CHANGE UP (ref 0–8)
GLUCOSE BLDC GLUCOMTR-MCNC: 188 MG/DL — HIGH (ref 70–99)
GLUCOSE BLDC GLUCOMTR-MCNC: 209 MG/DL — HIGH (ref 70–99)
GLUCOSE BLDC GLUCOMTR-MCNC: 221 MG/DL — HIGH (ref 70–99)
GLUCOSE BLDC GLUCOMTR-MCNC: 224 MG/DL — HIGH (ref 70–99)
GLUCOSE SERPL-MCNC: 221 MG/DL — HIGH (ref 70–99)
HCT VFR BLD CALC: 36.5 % — LOW (ref 42–52)
HGB BLD-MCNC: 11.9 G/DL — LOW (ref 14–18)
IMM GRANULOCYTES NFR BLD AUTO: 0.7 % — HIGH (ref 0.1–0.3)
LYMPHOCYTES # BLD AUTO: 18.8 % — LOW (ref 20.5–51.1)
LYMPHOCYTES # BLD AUTO: 2.79 K/UL — SIGNIFICANT CHANGE UP (ref 1.2–3.4)
MAGNESIUM SERPL-MCNC: 2.6 MG/DL — HIGH (ref 1.8–2.4)
MCHC RBC-ENTMCNC: 30.9 PG — SIGNIFICANT CHANGE UP (ref 27–31)
MCHC RBC-ENTMCNC: 32.6 G/DL — SIGNIFICANT CHANGE UP (ref 32–37)
MCV RBC AUTO: 94.8 FL — HIGH (ref 80–94)
MONOCYTES # BLD AUTO: 1.33 K/UL — HIGH (ref 0.1–0.6)
MONOCYTES NFR BLD AUTO: 9 % — SIGNIFICANT CHANGE UP (ref 1.7–9.3)
NEUTROPHILS # BLD AUTO: 10.14 K/UL — HIGH (ref 1.4–6.5)
NEUTROPHILS NFR BLD AUTO: 68.1 % — SIGNIFICANT CHANGE UP (ref 42.2–75.2)
NRBC # BLD: 0 /100 WBCS — SIGNIFICANT CHANGE UP (ref 0–0)
PLATELET # BLD AUTO: 474 K/UL — HIGH (ref 130–400)
PMV BLD: 9.3 FL — SIGNIFICANT CHANGE UP (ref 7.4–10.4)
POTASSIUM SERPL-MCNC: 4.1 MMOL/L — SIGNIFICANT CHANGE UP (ref 3.5–5)
POTASSIUM SERPL-SCNC: 4.1 MMOL/L — SIGNIFICANT CHANGE UP (ref 3.5–5)
PROT SERPL-MCNC: 6.2 G/DL — SIGNIFICANT CHANGE UP (ref 6–8)
RBC # BLD: 3.85 M/UL — LOW (ref 4.7–6.1)
RBC # FLD: 12.4 % — SIGNIFICANT CHANGE UP (ref 11.5–14.5)
SODIUM SERPL-SCNC: 133 MMOL/L — LOW (ref 135–146)
WBC # BLD: 14.86 K/UL — HIGH (ref 4.8–10.8)
WBC # FLD AUTO: 14.86 K/UL — HIGH (ref 4.8–10.8)

## 2024-12-29 PROCEDURE — 93010 ELECTROCARDIOGRAM REPORT: CPT

## 2024-12-29 PROCEDURE — 99291 CRITICAL CARE FIRST HOUR: CPT

## 2024-12-29 PROCEDURE — 71045 X-RAY EXAM CHEST 1 VIEW: CPT | Mod: 26

## 2024-12-29 RX ORDER — AMIODARONE HYDROCHLORIDE 200 MG/1
400 TABLET ORAL ONCE
Refills: 0 | Status: COMPLETED | OUTPATIENT
Start: 2024-12-29 | End: 2024-12-29

## 2024-12-29 RX ORDER — FUROSEMIDE 20 MG
80 TABLET ORAL ONCE
Refills: 0 | Status: COMPLETED | OUTPATIENT
Start: 2024-12-29 | End: 2024-12-29

## 2024-12-29 RX ORDER — AMIODARONE HYDROCHLORIDE 200 MG/1
400 TABLET ORAL
Refills: 0 | Status: DISCONTINUED | OUTPATIENT
Start: 2024-12-29 | End: 2025-01-11

## 2024-12-29 RX ORDER — GINKGO BILOBA 40 MG
5 CAPSULE ORAL AT BEDTIME
Refills: 0 | Status: DISCONTINUED | OUTPATIENT
Start: 2024-12-29 | End: 2025-01-16

## 2024-12-29 RX ORDER — BENZOCAINE AND MENTHOL 15; 3.6 MG/1; MG/1
1 LOZENGE ORAL
Refills: 0 | Status: DISCONTINUED | OUTPATIENT
Start: 2024-12-29 | End: 2025-01-16

## 2024-12-29 RX ORDER — LOSARTAN POTASSIUM 100 MG/1
25 TABLET, FILM COATED ORAL DAILY
Refills: 0 | Status: DISCONTINUED | OUTPATIENT
Start: 2024-12-29 | End: 2025-01-11

## 2024-12-29 RX ORDER — GUAIFENESIN/DEXTROMETHORPHAN 200-10MG/5
10 LIQUID (ML) ORAL EVERY 4 HOURS
Refills: 0 | Status: DISCONTINUED | OUTPATIENT
Start: 2024-12-29 | End: 2025-01-06

## 2024-12-29 RX ADMIN — TICAGRELOR 90 MILLIGRAM(S): 60 TABLET ORAL at 18:49

## 2024-12-29 RX ADMIN — KETOROLAC TROMETHAMINE 15 MILLIGRAM(S): 30 INJECTION INTRAMUSCULAR; INTRAVENOUS at 19:56

## 2024-12-29 RX ADMIN — Medication 1: at 12:16

## 2024-12-29 RX ADMIN — Medication 12.5 MILLIGRAM(S): at 18:50

## 2024-12-29 RX ADMIN — NICOTINE POLACRILEX 1 PATCH: 4 LOZENGE ORAL at 19:55

## 2024-12-29 RX ADMIN — Medication 12.5 MILLIGRAM(S): at 23:23

## 2024-12-29 RX ADMIN — Medication 12.5 MILLIGRAM(S): at 05:45

## 2024-12-29 RX ADMIN — AMIODARONE HYDROCHLORIDE 400 MILLIGRAM(S): 200 TABLET ORAL at 16:34

## 2024-12-29 RX ADMIN — HEPARIN SODIUM 1800 UNIT(S)/HR: 1000 INJECTION, SOLUTION INTRAVENOUS; SUBCUTANEOUS at 06:29

## 2024-12-29 RX ADMIN — Medication 8 UNIT(S): at 16:36

## 2024-12-29 RX ADMIN — Medication 8 UNIT(S): at 08:19

## 2024-12-29 RX ADMIN — Medication 12.5 MILLIGRAM(S): at 12:06

## 2024-12-29 RX ADMIN — TICAGRELOR 90 MILLIGRAM(S): 60 TABLET ORAL at 05:45

## 2024-12-29 RX ADMIN — NICOTINE POLACRILEX 1 PATCH: 4 LOZENGE ORAL at 00:17

## 2024-12-29 RX ADMIN — CHLORHEXIDINE GLUCONATE 1 APPLICATION(S): 1.2 RINSE ORAL at 12:07

## 2024-12-29 RX ADMIN — NICOTINE POLACRILEX 1 PATCH: 4 LOZENGE ORAL at 07:16

## 2024-12-29 RX ADMIN — LOSARTAN POTASSIUM 25 MILLIGRAM(S): 100 TABLET, FILM COATED ORAL at 12:05

## 2024-12-29 RX ADMIN — Medication 10 MILLILITER(S): at 23:22

## 2024-12-29 RX ADMIN — Medication 8 UNIT(S): at 12:15

## 2024-12-29 RX ADMIN — Medication 2: at 08:18

## 2024-12-29 RX ADMIN — PANTOPRAZOLE 40 MILLIGRAM(S): 40 TABLET, DELAYED RELEASE ORAL at 08:20

## 2024-12-29 RX ADMIN — Medication 81 MILLIGRAM(S): at 12:05

## 2024-12-29 RX ADMIN — Medication 80 MILLIGRAM(S): at 18:49

## 2024-12-29 RX ADMIN — Medication 10 MILLIGRAM(S): at 05:45

## 2024-12-29 RX ADMIN — NICOTINE POLACRILEX 1 PATCH: 4 LOZENGE ORAL at 23:27

## 2024-12-29 RX ADMIN — INSULIN GLARGINE-YFGN 25 UNIT(S): 100 INJECTION, SOLUTION SUBCUTANEOUS at 21:08

## 2024-12-29 RX ADMIN — Medication 2: at 16:35

## 2024-12-29 NOTE — CHART NOTE - NSCHARTNOTEFT_GEN_A_CORE
To Whom It Concern,    Fred Pena ( 1980) is critically ill and is hospitalised at [facility] in the CCU as of 2024. He was admitted on 2024. His discharge date is not known at this point. He is currently unable to perform his work duties due to his illness. To Whom It Concern,    Fred Pena ( 1980) is critically ill and is hospitalised at [facility] in the CCU as of 2024. He was admitted on 2024. His discharge date is not known at this point. It is unsafe for him to be leave the hospital to attend the meeting for house sale. To Whom It May Concern,    Fred Pena ( 1980) is critically ill and is hospitalised at [facility] in the CCU as of 2024. He was admitted on 2024. His discharge date is not known at this point. It is unsafe for him to be leave the hospital to attend the meeting for house sale. To Whom It May Concern,    Fred Pena ( 1980) is critically ill and is hospitalised at [facility] in the CCU as of 2024. He was admitted on 2024. His discharge date is not known at this point. It is unsafe for him to leave the hospital to attend the meeting for house sale.

## 2024-12-29 NOTE — PROGRESS NOTE ADULT - ASSESSMENT
43 YO M with PMHx of HTN, IDDM, HLD, active smoker (1/2 PPD x 28 yrs), hepatitis B (s/p treatment) presented with CC chest pressure and SOB.    TTE 12/27/24: EF 25-30%. Multiple RWMA. GIIDD. Small, fixed, anteroapical left ventricular thrombus. Right ventricular apical hypokinesis. Mild MR.     IMPRESSION  #Late presentation anterior MI   #Acute HFrEF, ICM, EF 25-30%  #Sustained monomorphic slow Vtach; 12 min overnight on 12/28 with rate ~ 140s  #Lv thrombus  #Acute hypoxic resp failure /mild ARDS COVID +ve  #HTN  #HLD  #IDDM w/ hyperglycemia  #Active smoker  #Alcohol and drug (Timothy dust) abuse     PLAN        PULMONARY:  CT chest with GGO and small right pleural effusion.     -pulmonary medicine eval   - monitor off steroids as oxygen requirements are not worsening      CARDIOVASCULAR:   - Continue heparin gtt. Requires long term anticoagulation for LV thrombus   - continue ASA 81 QD and ticagrelor 90mg BID.  -start losartan 25 mg daily, discontinue Isordil  - Metoprolol 12.5mg PO Q6  - dc lidocaine  -switch to amiodarone 400 mg BID PO  -LHC once optimized       GI:   - Pantoprazole 40mg IV QD.    RENAL:  - Follow up lytes. Correct as needed.    INFECTIOUS DISEASE:  - Monitor off abx.  - CHG 2% daily bath.    HEMATOLOGICAL:    - Heparin drip full AC     ENDOCRINE:    - Continue insulin gtt. Monitor FS. Keep b/w 140 - 180.    MUSCULOSKELETAL:   - AAT    Disposition: CCU     45 YO M with PMHx of HTN, IDDM, HLD, active smoker (1/2 PPD x 28 yrs), hepatitis B (s/p treatment) presented with CC chest pressure and SOB.    TTE 12/27/24: EF 25-30%. Multiple RWMA. GIIDD. Small, fixed, anteroapical left ventricular thrombus. Right ventricular apical hypokinesis. Mild MR.     IMPRESSION  #Late presentation anterior MI   #Acute HFrEF, ICM, EF 25-30%  #Sustained monomorphic slow Vtach; 12 min overnight on 12/28 with rate ~ 140s  #Lv thrombus  #Acute hypoxic resp failure /mild ARDS COVID +ve  #HTN  #HLD  #IDDM w/ hyperglycemia  #Active smoker  #Alcohol and drug (Timothy dust) abuse     PLAN        PULMONARY:  CT chest with GGO and small right pleural effusion.     -pulmonary medicine eval   - monitor off steroids as oxygen requirements are not worsening      CARDIOVASCULAR:   - Continue heparin gtt. Requires long term anticoagulation for LV thrombus   - continue ASA 81 QD and ticagrelor 90mg BID.  -start losartan 25 mg daily, discontinue Isordil  - Metoprolol 12.5mg PO Q6  - dc lidocaine  -switch to amiodarone 400 mg BID PO  -IVC 12/29 2.6 cm and collapsing <50% , IV lasix 80 mg x 1   -LHC once optimized       GI:   - Pantoprazole 40mg IV QD.    RENAL:  - Follow up lytes. Correct as needed.    INFECTIOUS DISEASE:  - Monitor off abx.  - CHG 2% daily bath.    HEMATOLOGICAL:    - Heparin drip full AC     ENDOCRINE:    - Continue insulin gtt. Monitor FS. Keep b/w 140 - 180.    MUSCULOSKELETAL:   - AAT    Disposition: CCU

## 2024-12-29 NOTE — PROGRESS NOTE ADULT - SUBJECTIVE AND OBJECTIVE BOX
CHIEF COMPLAINT:  Patient is a 44y old  Male who presents with a chief complaint of Acute ischemic heart disease     (28 Dec 2024 22:59)      INTERVAL HISTORY/OVERNIGHT EVENTS:    No events overnight.  Requiring 2-3 L NC o2.   ======================  MEDICATIONS:  aspirin  chewable 81 milliGRAM(s) Oral daily  chlorhexidine 2% Cloths 1 Application(s) Topical daily  dextrose 50% Injectable 25 Gram(s) IV Push once  dextrose 50% Injectable 12.5 Gram(s) IV Push once  dextrose 50% Injectable 25 Gram(s) IV Push once  glucagon  Injectable 1 milliGRAM(s) IntraMuscular once  insulin glargine Injectable (LANTUS) 25 Unit(s) SubCutaneous at bedtime  insulin lispro (ADMELOG) corrective regimen sliding scale   SubCutaneous three times a day before meals  insulin lispro Injectable (ADMELOG) 8 Unit(s) SubCutaneous three times a day before meals  losartan 25 milliGRAM(s) Oral daily  metoprolol tartrate 12.5 milliGRAM(s) Oral every 6 hours  nicotine -  14 mG/24Hr(s) Patch 1 Patch Transdermal every 24 hours  pantoprazole  Injectable 40 milliGRAM(s) IV Push with breakfast  ticagrelor 90 milliGRAM(s) Oral every 12 hours    DRIPS:  aMIOdarone Infusion 1 mG/Min (33.3 mL/Hr) IV Continuous <Continuous>  aMIOdarone Infusion 0.5 mG/Min (16.7 mL/Hr) IV Continuous <Continuous>  dextrose 5%. 1000 milliLiter(s) (50 mL/Hr) IV Continuous <Continuous>  dextrose 5%. 1000 milliLiter(s) (100 mL/Hr) IV Continuous <Continuous>  heparin  Infusion.  Unit(s)/Hr (18 mL/Hr) IV Continuous <Continuous>    PRN:       ======================  PHYSICAL EXAMINATION:  GEN:  nad.   HEENT:  eomi. ncat  PULM:  b/l lung sounds   CARD: s1, s2  ABD: +bs. ntnd  EXT:  no new rashes.    NEURO:  no new focal deficits.   ======================  OBJECTIVE:        VS:  T(F): 97 (12-29 @ 12:00), Max: 100.5 (12-28 @ 18:00)  HR: 77 (12-29 @ 12:00) (67 - 109)  BP: 101/75 (12-29 @ 12:00) (93/60 - 131/78)  RR: 27 (12-29 @ 12:00) (23 - 42)  SpO2: 100% (12-29 @ 12:00) (91% - 100%)  CVP(mm Hg): --  CO: --  CI: --  PA: --  PCWP: --    I/O:      12-26 @ 07:01  -  12-27 @ 07:00  --------------------------------------------------------  IN: 171 mL / OUT: 3250 mL / NET: -3079 mL    12-27 @ 07:01  -  12-28 @ 07:00  --------------------------------------------------------  IN: 1077.7 mL / OUT: 2150 mL / NET: -1072.3 mL    12-28 @ 07:01  -  12-29 @ 07:00  --------------------------------------------------------  IN: 1212.4 mL / OUT: 1300 mL / NET: -87.6 mL    12-29 @ 07:01  -  12-29 @ 13:29  --------------------------------------------------------  IN: 133.5 mL / OUT: 0 mL / NET: 133.5 mL        Weight trend:  Weight (kg): 96 (12-27)    ======================    LABS:  ABG - ( 28 Dec 2024 04:51 )  pH, Arterial: 7.52  pH, Blood: x     /  pCO2: 29    /  pO2: 187   / HCO3: 24    / Base Excess: 1.7   /  SaO2: 100.0                                   11.9   14.86 )-----------( 474      ( 29 Dec 2024 05:37 )             36.5     12-29    133[L]  |  95[L]  |  19  ----------------------------<  221[H]  4.1   |  25  |  0.7    Ca    8.0[L]      29 Dec 2024 05:37  Mg     2.6     12-29    TPro  6.2  /  Alb  2.7[L]  /  TBili  0.3  /  DBili  x   /  AST  24  /  ALT  28  /  AlkPhos  133[H]  12-29    LIVER FUNCTIONS - ( 29 Dec 2024 05:37 )  Alb: 2.7 g/dL / Pro: 6.2 g/dL / ALK PHOS: 133 U/L / ALT: 28 U/L / AST: 24 U/L / GGT: x           PT/INR - ( 28 Dec 2024 02:17 )   PT: 12.70 sec;   INR: 1.07 ratio         PTT - ( 29 Dec 2024 05:37 )  PTT:94.1 sec          Urinalysis Basic - ( 29 Dec 2024 05:37 )    Color: x / Appearance: x / SG: x / pH: x  Gluc: 221 mg/dL / Ketone: x  / Bili: x / Urobili: x   Blood: x / Protein: x / Nitrite: x   Leuk Esterase: x / RBC: x / WBC x   Sq Epi: x / Non Sq Epi: x / Bacteria: x        Cultures:    Culture - Blood (collected 12-26)  Source: .Blood BLOOD  Preliminary Report:    No growth at 48 Hours    Culture - Blood (collected 12-26)  Source: .Blood BLOOD  Preliminary Report:    No growth at 48 Hours

## 2024-12-30 LAB
ALBUMIN SERPL ELPH-MCNC: 2.5 G/DL — LOW (ref 3.5–5.2)
ALP SERPL-CCNC: 128 U/L — HIGH (ref 30–115)
ALT FLD-CCNC: 24 U/L — SIGNIFICANT CHANGE UP (ref 0–41)
ANION GAP SERPL CALC-SCNC: 13 MMOL/L — SIGNIFICANT CHANGE UP (ref 7–14)
ANION GAP SERPL CALC-SCNC: 14 MMOL/L — SIGNIFICANT CHANGE UP (ref 7–14)
APTT BLD: 84.3 SEC — CRITICAL HIGH (ref 27–39.2)
AST SERPL-CCNC: 22 U/L — SIGNIFICANT CHANGE UP (ref 0–41)
BASOPHILS # BLD AUTO: 0.05 K/UL — SIGNIFICANT CHANGE UP (ref 0–0.2)
BASOPHILS NFR BLD AUTO: 0.4 % — SIGNIFICANT CHANGE UP (ref 0–1)
BILIRUB SERPL-MCNC: <0.2 MG/DL — SIGNIFICANT CHANGE UP (ref 0.2–1.2)
BUN SERPL-MCNC: 18 MG/DL — SIGNIFICANT CHANGE UP (ref 10–20)
BUN SERPL-MCNC: 22 MG/DL — HIGH (ref 10–20)
CALCIUM SERPL-MCNC: 8 MG/DL — LOW (ref 8.4–10.4)
CALCIUM SERPL-MCNC: 8.1 MG/DL — LOW (ref 8.4–10.5)
CHLORIDE SERPL-SCNC: 97 MMOL/L — LOW (ref 98–110)
CHLORIDE SERPL-SCNC: 97 MMOL/L — LOW (ref 98–110)
CO2 SERPL-SCNC: 23 MMOL/L — SIGNIFICANT CHANGE UP (ref 17–32)
CO2 SERPL-SCNC: 25 MMOL/L — SIGNIFICANT CHANGE UP (ref 17–32)
CREAT SERPL-MCNC: 0.7 MG/DL — SIGNIFICANT CHANGE UP (ref 0.7–1.5)
CREAT SERPL-MCNC: 0.7 MG/DL — SIGNIFICANT CHANGE UP (ref 0.7–1.5)
EGFR: 117 ML/MIN/1.73M2 — SIGNIFICANT CHANGE UP
EGFR: 117 ML/MIN/1.73M2 — SIGNIFICANT CHANGE UP
EOSINOPHIL # BLD AUTO: 0.46 K/UL — SIGNIFICANT CHANGE UP (ref 0–0.7)
EOSINOPHIL NFR BLD AUTO: 4.1 % — SIGNIFICANT CHANGE UP (ref 0–8)
GAS PNL BLDA: SIGNIFICANT CHANGE UP
GLUCOSE BLDC GLUCOMTR-MCNC: 176 MG/DL — HIGH (ref 70–99)
GLUCOSE BLDC GLUCOMTR-MCNC: 177 MG/DL — HIGH (ref 70–99)
GLUCOSE BLDC GLUCOMTR-MCNC: 183 MG/DL — HIGH (ref 70–99)
GLUCOSE BLDC GLUCOMTR-MCNC: 208 MG/DL — HIGH (ref 70–99)
GLUCOSE SERPL-MCNC: 156 MG/DL — HIGH (ref 70–99)
GLUCOSE SERPL-MCNC: 189 MG/DL — HIGH (ref 70–99)
HCT VFR BLD CALC: 36.1 % — LOW (ref 42–52)
HGB BLD-MCNC: 12 G/DL — LOW (ref 14–18)
IMM GRANULOCYTES NFR BLD AUTO: 0.6 % — HIGH (ref 0.1–0.3)
LYMPHOCYTES # BLD AUTO: 18.2 % — LOW (ref 20.5–51.1)
LYMPHOCYTES # BLD AUTO: 2.03 K/UL — SIGNIFICANT CHANGE UP (ref 1.2–3.4)
MAGNESIUM SERPL-MCNC: 1.9 MG/DL — SIGNIFICANT CHANGE UP (ref 1.8–2.4)
MAGNESIUM SERPL-MCNC: 2.3 MG/DL — SIGNIFICANT CHANGE UP (ref 1.8–2.4)
MCHC RBC-ENTMCNC: 31.3 PG — HIGH (ref 27–31)
MCHC RBC-ENTMCNC: 33.2 G/DL — SIGNIFICANT CHANGE UP (ref 32–37)
MCV RBC AUTO: 94.3 FL — HIGH (ref 80–94)
MONOCYTES # BLD AUTO: 1.03 K/UL — HIGH (ref 0.1–0.6)
MONOCYTES NFR BLD AUTO: 9.3 % — SIGNIFICANT CHANGE UP (ref 1.7–9.3)
NEUTROPHILS # BLD AUTO: 7.49 K/UL — HIGH (ref 1.4–6.5)
NEUTROPHILS NFR BLD AUTO: 67.4 % — SIGNIFICANT CHANGE UP (ref 42.2–75.2)
NRBC # BLD: 0 /100 WBCS — SIGNIFICANT CHANGE UP (ref 0–0)
PLATELET # BLD AUTO: 460 K/UL — HIGH (ref 130–400)
PMV BLD: 9.4 FL — SIGNIFICANT CHANGE UP (ref 7.4–10.4)
POTASSIUM SERPL-MCNC: 4.1 MMOL/L — SIGNIFICANT CHANGE UP (ref 3.5–5)
POTASSIUM SERPL-MCNC: 4.3 MMOL/L — SIGNIFICANT CHANGE UP (ref 3.5–5)
POTASSIUM SERPL-SCNC: 4.1 MMOL/L — SIGNIFICANT CHANGE UP (ref 3.5–5)
POTASSIUM SERPL-SCNC: 4.3 MMOL/L — SIGNIFICANT CHANGE UP (ref 3.5–5)
PROT SERPL-MCNC: 5.9 G/DL — LOW (ref 6–8)
RBC # BLD: 3.83 M/UL — LOW (ref 4.7–6.1)
RBC # FLD: 12.3 % — SIGNIFICANT CHANGE UP (ref 11.5–14.5)
SODIUM SERPL-SCNC: 134 MMOL/L — LOW (ref 135–146)
SODIUM SERPL-SCNC: 135 MMOL/L — SIGNIFICANT CHANGE UP (ref 135–146)
WBC # BLD: 11.13 K/UL — HIGH (ref 4.8–10.8)
WBC # FLD AUTO: 11.13 K/UL — HIGH (ref 4.8–10.8)

## 2024-12-30 PROCEDURE — 99291 CRITICAL CARE FIRST HOUR: CPT

## 2024-12-30 PROCEDURE — 93010 ELECTROCARDIOGRAM REPORT: CPT

## 2024-12-30 PROCEDURE — 71045 X-RAY EXAM CHEST 1 VIEW: CPT | Mod: 26

## 2024-12-30 RX ORDER — INSULIN LISPRO 100/ML
9 VIAL (ML) SUBCUTANEOUS
Refills: 0 | Status: DISCONTINUED | OUTPATIENT
Start: 2024-12-30 | End: 2025-01-08

## 2024-12-30 RX ORDER — BUMETANIDE 2 MG/1
2 TABLET ORAL EVERY 8 HOURS
Refills: 0 | Status: DISCONTINUED | OUTPATIENT
Start: 2024-12-30 | End: 2025-01-02

## 2024-12-30 RX ORDER — INSULIN GLARGINE-YFGN 100 [IU]/ML
27 INJECTION, SOLUTION SUBCUTANEOUS AT BEDTIME
Refills: 0 | Status: DISCONTINUED | OUTPATIENT
Start: 2024-12-30 | End: 2025-01-07

## 2024-12-30 RX ADMIN — Medication 10 MILLILITER(S): at 14:30

## 2024-12-30 RX ADMIN — LOSARTAN POTASSIUM 25 MILLIGRAM(S): 100 TABLET, FILM COATED ORAL at 05:23

## 2024-12-30 RX ADMIN — Medication 9 UNIT(S): at 11:46

## 2024-12-30 RX ADMIN — Medication 9 UNIT(S): at 16:20

## 2024-12-30 RX ADMIN — Medication 10 MILLILITER(S): at 11:43

## 2024-12-30 RX ADMIN — Medication 1: at 08:19

## 2024-12-30 RX ADMIN — NICOTINE POLACRILEX 1 PATCH: 4 LOZENGE ORAL at 00:49

## 2024-12-30 RX ADMIN — TICAGRELOR 90 MILLIGRAM(S): 60 TABLET ORAL at 05:23

## 2024-12-30 RX ADMIN — NICOTINE POLACRILEX 1 PATCH: 4 LOZENGE ORAL at 07:12

## 2024-12-30 RX ADMIN — TICAGRELOR 90 MILLIGRAM(S): 60 TABLET ORAL at 17:38

## 2024-12-30 RX ADMIN — Medication 1: at 16:20

## 2024-12-30 RX ADMIN — Medication 2: at 11:46

## 2024-12-30 RX ADMIN — Medication 10 MILLILITER(S): at 18:40

## 2024-12-30 RX ADMIN — AMIODARONE HYDROCHLORIDE 400 MILLIGRAM(S): 200 TABLET ORAL at 05:23

## 2024-12-30 RX ADMIN — Medication 10 MILLILITER(S): at 05:23

## 2024-12-30 RX ADMIN — HEPARIN SODIUM 1800 UNIT(S)/HR: 1000 INJECTION, SOLUTION INTRAVENOUS; SUBCUTANEOUS at 15:37

## 2024-12-30 RX ADMIN — NICOTINE POLACRILEX 1 PATCH: 4 LOZENGE ORAL at 20:05

## 2024-12-30 RX ADMIN — Medication 12.5 MILLIGRAM(S): at 11:43

## 2024-12-30 RX ADMIN — Medication 5 MILLIGRAM(S): at 21:57

## 2024-12-30 RX ADMIN — Medication 81 MILLIGRAM(S): at 11:43

## 2024-12-30 RX ADMIN — Medication 9 UNIT(S): at 08:20

## 2024-12-30 RX ADMIN — PANTOPRAZOLE 40 MILLIGRAM(S): 40 TABLET, DELAYED RELEASE ORAL at 08:16

## 2024-12-30 RX ADMIN — INSULIN GLARGINE-YFGN 27 UNIT(S): 100 INJECTION, SOLUTION SUBCUTANEOUS at 21:58

## 2024-12-30 RX ADMIN — BUMETANIDE 2 MILLIGRAM(S): 2 TABLET ORAL at 20:46

## 2024-12-30 RX ADMIN — AMIODARONE HYDROCHLORIDE 400 MILLIGRAM(S): 200 TABLET ORAL at 17:38

## 2024-12-30 RX ADMIN — Medication 12.5 MILLIGRAM(S): at 17:38

## 2024-12-30 RX ADMIN — BUMETANIDE 2 MILLIGRAM(S): 2 TABLET ORAL at 14:30

## 2024-12-30 NOTE — CONSULT NOTE ADULT - SUBJECTIVE AND OBJECTIVE BOX
Patient is a 44y old  Male who presents with a chief complaint of Acute ischemic heart disease     (28 Dec 2024 22:59)      HPI:  44M with HTN, IDDM, HLD, active smoker (1/2 PPD x 28 yrs), hepatitis B (s/p treatment) presents with CC of chest pressure and SOB. 2 weeks ago, he started having cough w/ yellow and white sputum runny nose, generalized body aches. Then started developing REGAN about a week ago along with left sided chest pressure, initially about 6-7/10. The chest pressure/SOB are worse with laying flat, improve with leaning forward. No nausea vomiting, reports loose BM 1-2 per day x 2 weeks. Reports burning at end of urination about a week ago which has resolved.  His last alcoholic drink was 3 weeks ago. Also uses Timothy Dust occasionally, last use few weeks ago. Denies any injected drugs or cocaine.  Has not taken his meds including insulin in the last 2 weeks due to feeling sick and not eating much.    Triage VS: /98, , RR 26, SpO2 92% on RA, T 98.6F.  Labs: WBC 11.77k, D-dimer 543, CRP 91, HS troponin 400, pro-BNP 4126.  UA w/ 100 protein, 500 glucose, otherwise negative.  SARS-CoV-2 positive.  CXR w/ bilateral opacities.    ED interventions: ASA 325mg, ticagrelor 180mg, DuoNeb, ketorolac, 125mg IV methylprednisolone, 80mg IV furosemide. Was also placed on BIPAP which he did not tolerate (made him very anxious), despite repeated counseling. (27 Dec 2024 00:27)      PAST MEDICAL & SURGICAL HISTORY:  HTN (hypertension)      Hepatitis B      DM (diabetes mellitus)      Smoker; PCP use    FAMILY HISTORY:  :  No known cardiovacular family hisotry     ROS:  See HPI     Allergies    Seafood (Urticaria)  No Known Drug Allergies  shellfish (Urticaria; Rash; Short breath)    Intolerances          PHYSICAL EXAM    ICU Vital Signs Last 24 Hrs  T(C): 36.8 (30 Dec 2024 08:00), Max: 37.2 (29 Dec 2024 22:00)  T(F): 98.3 (30 Dec 2024 08:00), Max: 98.9 (29 Dec 2024 22:00)  HR: 83 (30 Dec 2024 09:00) (67 - 86)  BP: 96/56 (30 Dec 2024 08:00) (82/52 - 119/72)  BP(mean): 70 (30 Dec 2024 08:00) (62 - 90)  ABP: --  ABP(mean): --  RR: 36 (30 Dec 2024 09:00) (24 - 40)  SpO2: 98% (30 Dec 2024 09:00) (97% - 100%)    O2 Parameters below as of 29 Dec 2024 10:00  Patient On (Oxygen Delivery Method): mask, nonrebreather  O2 Flow (L/min): 10          General: In NAD ; unable to lay flat   HEENT:  BART              Lymphatic system: No cervical LN   Lungs: Bilateral BS; coarse; effusions small on US, B lines diffuse   Cardiovascular: Regular  Gastrointestinal: Soft, Positive BS  Musculoskeletal: No clubbing.  Moves all extremities.  Full range of motion   Skin: Warm.  Intact  Neurological: No motor or sensory deficit       12-29-24 @ 07:01  -  12-30-24 @ 07:00  --------------------------------------------------------  IN:    Amiodarone: 133.6 mL    Heparin Infusion: 432 mL    Lidocaine: 15 mL    Oral Fluid: 220 mL  Total IN: 800.6 mL    OUT:    Voided (mL): 900 mL  Total OUT: 900 mL    Total NET: -99.4 mL          LABS:                          12.0   11.13 )-----------( 460      ( 30 Dec 2024 05:01 )             36.1                                               12-30    134[L]  |  97[L]  |  22[H]  ----------------------------<  189[H]  4.3   |  23  |  0.7    Ca    8.0[L]      30 Dec 2024 05:01  Mg     2.3     12-30    TPro  5.9[L]  /  Alb  2.5[L]  /  TBili  <0.2  /  DBili  x   /  AST  22  /  ALT  24  /  AlkPhos  128[H]  12-30      PTT - ( 30 Dec 2024 05:01 )  PTT:84.3 sec                                       Urinalysis Basic - ( 30 Dec 2024 05:01 )    Color: x / Appearance: x / SG: x / pH: x  Gluc: 189 mg/dL / Ketone: x  / Bili: x / Urobili: x   Blood: x / Protein: x / Nitrite: x   Leuk Esterase: x / RBC: x / WBC x   Sq Epi: x / Non Sq Epi: x / Bacteria: x                                                  LIVER FUNCTIONS - ( 30 Dec 2024 05:01 )  Alb: 2.5 g/dL / Pro: 5.9 g/dL / ALK PHOS: 128 U/L / ALT: 24 U/L / AST: 22 U/L / GGT: x                                                                                                                                       X-Rays bilateral infiltrates and effusions                                                                                      ECHO    MEDICATIONS  (STANDING):  aMIOdarone    Tablet 400 milliGRAM(s) Oral two times a day  aspirin  chewable 81 milliGRAM(s) Oral daily  chlorhexidine 2% Cloths 1 Application(s) Topical daily  dextrose 5%. 1000 milliLiter(s) (50 mL/Hr) IV Continuous <Continuous>  dextrose 5%. 1000 milliLiter(s) (100 mL/Hr) IV Continuous <Continuous>  dextrose 50% Injectable 25 Gram(s) IV Push once  dextrose 50% Injectable 12.5 Gram(s) IV Push once  dextrose 50% Injectable 25 Gram(s) IV Push once  glucagon  Injectable 1 milliGRAM(s) IntraMuscular once  guaifenesin/dextromethorphan Oral Liquid 10 milliLiter(s) Oral every 4 hours  heparin  Infusion.  Unit(s)/Hr (18 mL/Hr) IV Continuous <Continuous>  insulin glargine Injectable (LANTUS) 27 Unit(s) SubCutaneous at bedtime  insulin lispro (ADMELOG) corrective regimen sliding scale   SubCutaneous three times a day before meals  insulin lispro Injectable (ADMELOG) 9 Unit(s) SubCutaneous three times a day before meals  losartan 25 milliGRAM(s) Oral daily  melatonin 5 milliGRAM(s) Oral at bedtime  metoprolol tartrate 12.5 milliGRAM(s) Oral every 6 hours  nicotine -  14 mG/24Hr(s) Patch 1 Patch Transdermal every 24 hours  pantoprazole  Injectable 40 milliGRAM(s) IV Push with breakfast  ticagrelor 90 milliGRAM(s) Oral every 12 hours    MEDICATIONS  (PRN):  acetaminophen     Tablet .. 650 milliGRAM(s) Oral every 6 hours PRN Mild Pain (1 - 3)  benzocaine/menthol Lozenge 1 Lozenge Oral every 3 hours PRN Sore Throat  dextrose Oral Gel 15 Gram(s) Oral once PRN Blood Glucose LESS THAN 70 milliGRAM(s)/deciliter  dextrose Oral Gel 15 Gram(s) Oral once PRN Blood Glucose LESS THAN 70 milliGRAM(s)/deciliter  heparin   Injectable 8000 Unit(s) IV Push every 6 hours PRN For aPTT less than 40  heparin   Injectable 4000 Unit(s) IV Push every 6 hours PRN For aPTT between 40 - 57

## 2024-12-30 NOTE — PROGRESS NOTE ADULT - ASSESSMENT
45 YO M with PMHx of HTN, IDDM, HLD, active smoker (1/2 PPD x 28 yrs), hepatitis B (s/p treatment) presented with CC chest pressure and SOB.    TTE 12/27/24: EF 25-30%. Multiple RWMA. GIIDD. Small, fixed, anteroapical left ventricular thrombus. Right ventricular apical hypokinesis. Mild MR.     IMPRESSION  #Late presentation anterior MI   #Acute HFrEF, ICM, EF 25-30%  #Sustained monomorphic slow Vtach; 12 min overnight on 12/28 with rate ~ 140s  #Lv thrombus  #Acute hypoxic resp failure /mild ARDS COVID +ve  #HTN  #HLD  #IDDM w/ hyperglycemia  #Active smoker  #Alcohol and drug (Timothy dust) abuse         PLAN    CNS:   Patient anxious about moving to [place],   No pain    PULMONARY:  CT chest with GGO and small right pleural effusion.   -xray 12-30: bilateral effusion with bilateral opacities  - monitor off steroids as oxygen requirements are not worsening and had recent MI  - wean off o2 as tolerated.  - sputum cuture    CARDIOVASCULAR:   - Continue heparin gtt. Requires long term anticoagulation for LV thrombus   - continue ASA 81 QD and ticagrelor 90mg BID.  -start losartan 25 mg daily, discontinue Isordil  - Metoprolol 12.5mg PO Q6  -s/p lidocaine   -c/w amiodarone 400 mg BID PO. today day 2  - bumex 2 TID. target negative 2 L  -LHC once optimized. Planned for AM  - Needs AICD/ Life vest before DC    GI:   - Pantoprazole 40mg IV QD.    RENAL:  - Follow up lytes. Correct as needed.    INFECTIOUS DISEASE:  - Monitor off abx.  - CHG 2% daily bath.    HEMATOLOGICAL:    - Heparin drip full AC     ENDOCRINE:    -TSH 0.99  -A1c 10.8  -    MUSCULOSKELETAL:   - AAT    Disposition: CCU     43 YO M with PMHx of HTN, IDDM, HLD, active smoker (1/2 PPD x 28 yrs), hepatitis B (s/p treatment) presented with CC chest pressure and SOB.    TTE 12/27/24: EF 25-30%. Multiple RWMA. GIIDD. Small, fixed, anteroapical left ventricular thrombus. Right ventricular apical hypokinesis. Mild MR.     IMPRESSION  #Late presentation anterior MI   #Acute HFrEF, ICM, EF 25-30%  #Sustained monomorphic slow Vtach; 12 min overnight on 12/28 with rate ~ 140s  #Lv thrombus  #Acute hypoxic resp failure /mild ARDS COVID +ve  #HTN  #HLD  #IDDM w/ hyperglycemia  #Active smoker  #Alcohol and drug (Timothy dust) abuse         PLAN    CNS:   Patient anxious about moving [place],   No pain    PULMONARY:  CT chest with GGO and small right pleural effusion.   -xray 12-30: bilateral effusion with bilateral opacities  - monitor off steroids as oxygen requirements are not worsening and had recent MI  - wean off o2 as tolerated. HFNC at MN  - sputum culture    CARDIOVASCULAR:   - Continue heparin gtt. Requires long term anticoagulation for LV thrombus   - continue ASA 81 QD and ticagrelor 90mg BID.  -start losartan 25 mg daily, discontinue Isordil  - Metoprolol 12.5mg PO Q6  -s/p lidocaine   -c/w amiodarone 400 mg BID PO. today day 2  - bumex 2 TID. target negative 2 L  -LHC once optimized. Planned for AM. NPO after MN  - Needs AICD/ Life vest before DC    GI:   - Pantoprazole 40mg IV QD.    RENAL:  - Follow up lytes. Correct as needed.    INFECTIOUS DISEASE:  - Monitor off abx.  - CHG 2% daily bath.    HEMATOLOGICAL:    - Heparin drip full AC     ENDOCRINE:    -TSH 0.99  -A1c 10.8  -    MUSCULOSKELETAL:   - AAT    Disposition: CCU

## 2024-12-30 NOTE — PROGRESS NOTE ADULT - SUBJECTIVE AND OBJECTIVE BOX
Patient is a 44y old  Male who presents with a chief complaint of Acute ischemic heart disease     (28 Dec 2024 22:59)      INTERVAL HPI/OVERNIGHT EVENTS:   No overnight events. this morning patient reports some blood tinged spuum that started 3 days ago. ROS was otherwise ngative  no Vtach on tele    ICU Vital Signs Last 24 Hrs  T(C): 36.7 (30 Dec 2024 12:00), Max: 37.2 (29 Dec 2024 22:00)  T(F): 98 (30 Dec 2024 12:00), Max: 98.9 (29 Dec 2024 22:00)  HR: 80 (30 Dec 2024 15:00) (69 - 86)  BP: 103/52 (30 Dec 2024 15:00) (91/58 - 122/68)  BP(mean): 74 (30 Dec 2024 15:00) (66 - 90)  ABP: --  ABP(mean): --  RR: 31 (30 Dec 2024 15:00) (23 - 40)  SpO2: 95% (30 Dec 2024 15:00) (95% - 100%)    O2 Parameters below as of 30 Dec 2024 12:00  Patient On (Oxygen Delivery Method): nasal cannula, high flow  O2 Flow (L/min): 40  O2 Concentration (%): 40      I&O's Summary    29 Dec 2024 07:01  -  30 Dec 2024 07:00  --------------------------------------------------------  IN: 800.6 mL / OUT: 900 mL / NET: -99.4 mL    30 Dec 2024 07:01  -  30 Dec 2024 16:00  --------------------------------------------------------  IN: 162 mL / OUT: 800 mL / NET: -638 mL          LABS:                        12.0   11.13 )-----------( 460      ( 30 Dec 2024 05:01 )             36.1     12-30    134[L]  |  97[L]  |  22[H]  ----------------------------<  189[H]  4.3   |  23  |  0.7    Ca    8.0[L]      30 Dec 2024 05:01  Mg     2.3     12-30    TPro  5.9[L]  /  Alb  2.5[L]  /  TBili  <0.2  /  DBili  x   /  AST  22  /  ALT  24  /  AlkPhos  128[H]  12-30    PTT - ( 30 Dec 2024 05:01 )  PTT:84.3 sec  Urinalysis Basic - ( 30 Dec 2024 05:01 )    Color: x / Appearance: x / SG: x / pH: x  Gluc: 189 mg/dL / Ketone: x  / Bili: x / Urobili: x   Blood: x / Protein: x / Nitrite: x   Leuk Esterase: x / RBC: x / WBC x   Sq Epi: x / Non Sq Epi: x / Bacteria: x      CAPILLARY BLOOD GLUCOSE      POCT Blood Glucose.: 208 mg/dL (30 Dec 2024 11:45)  POCT Blood Glucose.: 177 mg/dL (30 Dec 2024 08:17)  POCT Blood Glucose.: 224 mg/dL (29 Dec 2024 21:08)  POCT Blood Glucose.: 221 mg/dL (29 Dec 2024 16:22)    ABG - ( 30 Dec 2024 14:42 )  pH, Arterial: 7.50  pH, Blood: x     /  pCO2: 34    /  pO2: 79    / HCO3: 26    / Base Excess: 3.6   /  SaO2: 96.6                RADIOLOGY & ADDITIONAL TESTS:    Consultant(s) Notes Reviewed:  [x ] YES  [ ] NO    MEDICATIONS  (STANDING):  aMIOdarone    Tablet 400 milliGRAM(s) Oral two times a day  aspirin  chewable 81 milliGRAM(s) Oral daily  buMETAnide Injectable 2 milliGRAM(s) IV Push every 8 hours  chlorhexidine 2% Cloths 1 Application(s) Topical daily  glucagon  Injectable 1 milliGRAM(s) IntraMuscular once  guaifenesin/dextromethorphan Oral Liquid 10 milliLiter(s) Oral every 4 hours  heparin  Infusion.  Unit(s)/Hr (18 mL/Hr) IV Continuous <Continuous>  insulin glargine Injectable (LANTUS) 27 Unit(s) SubCutaneous at bedtime  insulin lispro (ADMELOG) corrective regimen sliding scale   SubCutaneous three times a day before meals  insulin lispro Injectable (ADMELOG) 9 Unit(s) SubCutaneous three times a day before meals  losartan 25 milliGRAM(s) Oral daily  melatonin 5 milliGRAM(s) Oral at bedtime  metoprolol tartrate 12.5 milliGRAM(s) Oral every 6 hours  nicotine -  14 mG/24Hr(s) Patch 1 Patch Transdermal every 24 hours  pantoprazole  Injectable 40 milliGRAM(s) IV Push with breakfast  ticagrelor 90 milliGRAM(s) Oral every 12 hours    MEDICATIONS  (PRN):  acetaminophen     Tablet .. 650 milliGRAM(s) Oral every 6 hours PRN Mild Pain (1 - 3)  benzocaine/menthol Lozenge 1 Lozenge Oral every 3 hours PRN Sore Throat  heparin   Injectable 8000 Unit(s) IV Push every 6 hours PRN For aPTT less than 40  heparin   Injectable 4000 Unit(s) IV Push every 6 hours PRN For aPTT between 40 - 57      PHYSICAL EXAM:  GENERAL:   HEAD:  Atraumatic, Normocephalic  EYES: EOMI, PERRLA, conjunctiva and sclera clear  NECK: Supple, No JVD, Normal thyroid, no enlarged nodes  NERVOUS SYSTEM:  Alert & Awake.   CHEST/LUNG: B/L good air entry; No rales, rhonchi, or wheezing  HEART: S1S2 normal, no S3, Regular rate and rhythm; No murmurs  ABDOMEN: Soft, Nontender, Nondistended; Bowel sounds present  EXTREMITIES:  2+ Peripheral Pulses, No clubbing, cyanosis, or edema  LYMPH: No lymphadenopathy noted  SKIN: No rashes or lesions    Care Discussed with Consultants/Other Providers [ x] YES  [ ] NO Patient is a 44y old  Male who presents with a chief complaint of Acute ischemic heart disease     (28 Dec 2024 22:59)      INTERVAL HPI/OVERNIGHT EVENTS:   No overnight events. this morning patient reports some blood tinged sputum that started 3 days ago. ROS was otherwise negative  no Vtach on tele    ICU Vital Signs Last 24 Hrs  T(C): 36.7 (30 Dec 2024 12:00), Max: 37.2 (29 Dec 2024 22:00)  T(F): 98 (30 Dec 2024 12:00), Max: 98.9 (29 Dec 2024 22:00)  HR: 80 (30 Dec 2024 15:00) (69 - 86)  BP: 103/52 (30 Dec 2024 15:00) (91/58 - 122/68)  BP(mean): 74 (30 Dec 2024 15:00) (66 - 90)  ABP: --  ABP(mean): --  RR: 31 (30 Dec 2024 15:00) (23 - 40)  SpO2: 95% (30 Dec 2024 15:00) (95% - 100%)    O2 Parameters below as of 30 Dec 2024 12:00  Patient On (Oxygen Delivery Method): nasal cannula, high flow  O2 Flow (L/min): 40  O2 Concentration (%): 40      I&O's Summary    29 Dec 2024 07:01  -  30 Dec 2024 07:00  --------------------------------------------------------  IN: 800.6 mL / OUT: 900 mL / NET: -99.4 mL    30 Dec 2024 07:01  -  30 Dec 2024 16:00  --------------------------------------------------------  IN: 162 mL / OUT: 800 mL / NET: -638 mL          LABS:                        12.0   11.13 )-----------( 460      ( 30 Dec 2024 05:01 )             36.1     12-30    134[L]  |  97[L]  |  22[H]  ----------------------------<  189[H]  4.3   |  23  |  0.7    Ca    8.0[L]      30 Dec 2024 05:01  Mg     2.3     12-30    TPro  5.9[L]  /  Alb  2.5[L]  /  TBili  <0.2  /  DBili  x   /  AST  22  /  ALT  24  /  AlkPhos  128[H]  12-30    PTT - ( 30 Dec 2024 05:01 )  PTT:84.3 sec  Urinalysis Basic - ( 30 Dec 2024 05:01 )    Color: x / Appearance: x / SG: x / pH: x  Gluc: 189 mg/dL / Ketone: x  / Bili: x / Urobili: x   Blood: x / Protein: x / Nitrite: x   Leuk Esterase: x / RBC: x / WBC x   Sq Epi: x / Non Sq Epi: x / Bacteria: x      CAPILLARY BLOOD GLUCOSE      POCT Blood Glucose.: 208 mg/dL (30 Dec 2024 11:45)  POCT Blood Glucose.: 177 mg/dL (30 Dec 2024 08:17)  POCT Blood Glucose.: 224 mg/dL (29 Dec 2024 21:08)  POCT Blood Glucose.: 221 mg/dL (29 Dec 2024 16:22)    ABG - ( 30 Dec 2024 14:42 )  pH, Arterial: 7.50  pH, Blood: x     /  pCO2: 34    /  pO2: 79    / HCO3: 26    / Base Excess: 3.6   /  SaO2: 96.6                RADIOLOGY & ADDITIONAL TESTS:    Consultant(s) Notes Reviewed:  [x ] YES  [ ] NO    MEDICATIONS  (STANDING):  aMIOdarone    Tablet 400 milliGRAM(s) Oral two times a day  aspirin  chewable 81 milliGRAM(s) Oral daily  buMETAnide Injectable 2 milliGRAM(s) IV Push every 8 hours  chlorhexidine 2% Cloths 1 Application(s) Topical daily  glucagon  Injectable 1 milliGRAM(s) IntraMuscular once  guaifenesin/dextromethorphan Oral Liquid 10 milliLiter(s) Oral every 4 hours  heparin  Infusion.  Unit(s)/Hr (18 mL/Hr) IV Continuous <Continuous>  insulin glargine Injectable (LANTUS) 27 Unit(s) SubCutaneous at bedtime  insulin lispro (ADMELOG) corrective regimen sliding scale   SubCutaneous three times a day before meals  insulin lispro Injectable (ADMELOG) 9 Unit(s) SubCutaneous three times a day before meals  losartan 25 milliGRAM(s) Oral daily  melatonin 5 milliGRAM(s) Oral at bedtime  metoprolol tartrate 12.5 milliGRAM(s) Oral every 6 hours  nicotine -  14 mG/24Hr(s) Patch 1 Patch Transdermal every 24 hours  pantoprazole  Injectable 40 milliGRAM(s) IV Push with breakfast  ticagrelor 90 milliGRAM(s) Oral every 12 hours    MEDICATIONS  (PRN):  acetaminophen     Tablet .. 650 milliGRAM(s) Oral every 6 hours PRN Mild Pain (1 - 3)  benzocaine/menthol Lozenge 1 Lozenge Oral every 3 hours PRN Sore Throat  heparin   Injectable 8000 Unit(s) IV Push every 6 hours PRN For aPTT less than 40  heparin   Injectable 4000 Unit(s) IV Push every 6 hours PRN For aPTT between 40 - 57      PHYSICAL EXAM:  GENERAL: awake and alert, anxious, appears tachypneic  NECK: Supple, No JVD, no HJR\  CHEST/LUNG: B/L good air entry; crackles at the left base  HEART: Regular rate and rhythm; No murmurs  ABDOMEN: Soft, Nontender, Nondistended; Bowel sounds present  EXTREMITIES:  2+ Peripheral Pulses, 1+ pitting edema      Care Discussed with Consultants/Other Providers [ x] YES  [ ] NO Patient is a 44y old  Male who presents with a chief complaint of Acute ischemic heart disease     (28 Dec 2024 22:59)      INTERVAL HPI/OVERNIGHT EVENTS:   No overnight events. this morning patient reports some blood tinged sputum that started 3 days ago. ROS was otherwise negative  no Vtach on tele    ICU Vital Signs Last 24 Hrs  T(C): 36.7 (30 Dec 2024 12:00), Max: 37.2 (29 Dec 2024 22:00)  T(F): 98 (30 Dec 2024 12:00), Max: 98.9 (29 Dec 2024 22:00)  HR: 80 (30 Dec 2024 15:00) (69 - 86)  BP: 103/52 (30 Dec 2024 15:00) (91/58 - 122/68)  BP(mean): 74 (30 Dec 2024 15:00) (66 - 90)  ABP: --  ABP(mean): --  RR: 31 (30 Dec 2024 15:00) (23 - 40)  SpO2: 95% (30 Dec 2024 15:00) (95% - 100%)    O2 Parameters below as of 30 Dec 2024 12:00  Patient On (Oxygen Delivery Method): nasal cannula, high flow  O2 Flow (L/min): 40  O2 Concentration (%): 40      I&O's Summary    29 Dec 2024 07:01  -  30 Dec 2024 07:00  --------------------------------------------------------  IN: 800.6 mL / OUT: 900 mL / NET: -99.4 mL    30 Dec 2024 07:01  -  30 Dec 2024 16:00  --------------------------------------------------------  IN: 162 mL / OUT: 800 mL / NET: -638 mL          LABS:                        12.0   11.13 )-----------( 460      ( 30 Dec 2024 05:01 )             36.1     12-30    134[L]  |  97[L]  |  22[H]  ----------------------------<  189[H]  4.3   |  23  |  0.7    Ca    8.0[L]      30 Dec 2024 05:01  Mg     2.3     12-30    TPro  5.9[L]  /  Alb  2.5[L]  /  TBili  <0.2  /  DBili  x   /  AST  22  /  ALT  24  /  AlkPhos  128[H]  12-30    PTT - ( 30 Dec 2024 05:01 )  PTT:84.3 sec  Urinalysis Basic - ( 30 Dec 2024 05:01 )    Color: x / Appearance: x / SG: x / pH: x  Gluc: 189 mg/dL / Ketone: x  / Bili: x / Urobili: x   Blood: x / Protein: x / Nitrite: x   Leuk Esterase: x / RBC: x / WBC x   Sq Epi: x / Non Sq Epi: x / Bacteria: x      CAPILLARY BLOOD GLUCOSE      POCT Blood Glucose.: 208 mg/dL (30 Dec 2024 11:45)  POCT Blood Glucose.: 177 mg/dL (30 Dec 2024 08:17)  POCT Blood Glucose.: 224 mg/dL (29 Dec 2024 21:08)  POCT Blood Glucose.: 221 mg/dL (29 Dec 2024 16:22)    ABG - ( 30 Dec 2024 14:42 )  pH, Arterial: 7.50  pH, Blood: x     /  pCO2: 34    /  pO2: 79    / HCO3: 26    / Base Excess: 3.6   /  SaO2: 96.6                RADIOLOGY & ADDITIONAL TESTS:    Consultant(s) Notes Reviewed:  [x ] YES  [ ] NO    MEDICATIONS  (STANDING):  aMIOdarone    Tablet 400 milliGRAM(s) Oral two times a day  aspirin  chewable 81 milliGRAM(s) Oral daily  buMETAnide Injectable 2 milliGRAM(s) IV Push every 8 hours  chlorhexidine 2% Cloths 1 Application(s) Topical daily  glucagon  Injectable 1 milliGRAM(s) IntraMuscular once  guaifenesin/dextromethorphan Oral Liquid 10 milliLiter(s) Oral every 4 hours  heparin  Infusion.  Unit(s)/Hr (18 mL/Hr) IV Continuous <Continuous>  insulin glargine Injectable (LANTUS) 27 Unit(s) SubCutaneous at bedtime  insulin lispro (ADMELOG) corrective regimen sliding scale   SubCutaneous three times a day before meals  insulin lispro Injectable (ADMELOG) 9 Unit(s) SubCutaneous three times a day before meals  losartan 25 milliGRAM(s) Oral daily  melatonin 5 milliGRAM(s) Oral at bedtime  metoprolol tartrate 12.5 milliGRAM(s) Oral every 6 hours  nicotine -  14 mG/24Hr(s) Patch 1 Patch Transdermal every 24 hours  pantoprazole  Injectable 40 milliGRAM(s) IV Push with breakfast  ticagrelor 90 milliGRAM(s) Oral every 12 hours    MEDICATIONS  (PRN):  acetaminophen     Tablet .. 650 milliGRAM(s) Oral every 6 hours PRN Mild Pain (1 - 3)  benzocaine/menthol Lozenge 1 Lozenge Oral every 3 hours PRN Sore Throat  heparin   Injectable 8000 Unit(s) IV Push every 6 hours PRN For aPTT less than 40  heparin   Injectable 4000 Unit(s) IV Push every 6 hours PRN For aPTT between 40 - 57      PHYSICAL EXAM:  GENERAL: awake and alert, anxious, appears tachypneic  NECK: Supple, No JVD, no HJR  CHEST/LUNG: B/L good air entry; crackles at the left base  HEART: Regular rate and rhythm; No murmurs  ABDOMEN: Soft, Nontender, Nondistended; Bowel sounds present  EXTREMITIES:  2+ Peripheral Pulses, 1+ pitting edema      Care Discussed with Consultants/Other Providers [ x] YES  [ ] NO

## 2024-12-30 NOTE — CONSULT NOTE ADULT - ASSESSMENT
Impression:     NSTEMI   HFREF with severely reduced EF   VTACH s/p cardioversion   LV thrombus   COVID pneumonia   Acute hypoxemic respiratory failure     IMPRESSION:      PLAN:    CNS: MS adequate.     HEENT: Oral care    PULMONARY:  HOB @ 45 degrees. CT noted with effusions: moderate to small; infiltrates as well. Likely combination of Fluid overload and COVID. HFNC 40/40. ABG today.     CARDIOVASCULAR: Assess bedside IVC; orthopnea is present. Bumex 2 mg IV BID for today. Negative balance daily. EF severely reduced. On Amio and BB.     GI: GI prophylaxis.  Feeding as tolerated.     RENAL:  Follow up lytes.  Correct as needed. Kidney at baseline. No davis.     INFECTIOUS DISEASE: Afebrile. blood culture negative. Check sputum culture. COVID positive. ID evaluation. Dexamethasone if No CI from cardiology. Procal.     HEMATOLOGICAL:  On therapeutic AC. No drop in hg.     ENDOCRINE:  Follow up FS.  Insulin protocol if needed.     MUSCULOSKELETAL: Out of bed.     CCU care.          Impression:     NSTEMI   HFREF with severely reduced EF   VTACH s/p cardioversion   LV thrombus   COVID pneumonia   Acute hypoxemic respiratory failure     IMPRESSION:      PLAN:    CNS: MS adequate.     HEENT: Oral care    PULMONARY:  HOB @ 45 degrees. CT noted with effusions: moderate to small; infiltrates as well. Likely combination of Fluid overload and COVID. HFNC 40/40. ABG today.     CARDIOVASCULAR: Assess bedside IVC; orthopnea is present. Bumex 2 mg IV BID for today. Negative balance daily. EF severely reduced. On Amio and BB.     GI: GI prophylaxis.  Feeding as tolerated.     RENAL:  Follow up lytes.  Correct as needed. Kidney at baseline. No davis.     INFECTIOUS DISEASE: Afebrile. blood culture negative. Check sputum culture. COVID positive. ID evaluation. Dexamethasone if No CI from cardiology: Can do 6mg BID. Procal. EMpiric Rocephin and Doxy if ID agrees.     HEMATOLOGICAL:  On therapeutic AC. No drop in hg.     ENDOCRINE:  Follow up FS.  Insulin protocol if needed.     MUSCULOSKELETAL: Out of bed.     CCU care.

## 2024-12-31 LAB
ALBUMIN SERPL ELPH-MCNC: 2.6 G/DL — LOW (ref 3.5–5.2)
ALP SERPL-CCNC: 153 U/L — HIGH (ref 30–115)
ALT FLD-CCNC: 26 U/L — SIGNIFICANT CHANGE UP (ref 0–41)
ANION GAP SERPL CALC-SCNC: 14 MMOL/L — SIGNIFICANT CHANGE UP (ref 7–14)
APTT BLD: 100.3 SEC — CRITICAL HIGH (ref 27–39.2)
APTT BLD: 63.3 SEC — HIGH (ref 27–39.2)
AST SERPL-CCNC: 27 U/L — SIGNIFICANT CHANGE UP (ref 0–41)
BASOPHILS # BLD AUTO: 0.07 K/UL — SIGNIFICANT CHANGE UP (ref 0–0.2)
BASOPHILS NFR BLD AUTO: 0.6 % — SIGNIFICANT CHANGE UP (ref 0–1)
BILIRUB SERPL-MCNC: 0.2 MG/DL — SIGNIFICANT CHANGE UP (ref 0.2–1.2)
BUN SERPL-MCNC: 15 MG/DL — SIGNIFICANT CHANGE UP (ref 10–20)
CALCIUM SERPL-MCNC: 8.2 MG/DL — LOW (ref 8.4–10.5)
CHLORIDE SERPL-SCNC: 96 MMOL/L — LOW (ref 98–110)
CO2 SERPL-SCNC: 23 MMOL/L — SIGNIFICANT CHANGE UP (ref 17–32)
CREAT SERPL-MCNC: 0.7 MG/DL — SIGNIFICANT CHANGE UP (ref 0.7–1.5)
CRP SERPL-MCNC: 101.8 MG/L — HIGH
EGFR: 117 ML/MIN/1.73M2 — SIGNIFICANT CHANGE UP
EOSINOPHIL # BLD AUTO: 0.49 K/UL — SIGNIFICANT CHANGE UP (ref 0–0.7)
EOSINOPHIL NFR BLD AUTO: 4.4 % — SIGNIFICANT CHANGE UP (ref 0–8)
GAS PNL BLDA: SIGNIFICANT CHANGE UP
GLUCOSE BLDC GLUCOMTR-MCNC: 119 MG/DL — HIGH (ref 70–99)
GLUCOSE BLDC GLUCOMTR-MCNC: 141 MG/DL — HIGH (ref 70–99)
GLUCOSE BLDC GLUCOMTR-MCNC: 144 MG/DL — HIGH (ref 70–99)
GLUCOSE BLDC GLUCOMTR-MCNC: 183 MG/DL — HIGH (ref 70–99)
GLUCOSE SERPL-MCNC: 156 MG/DL — HIGH (ref 70–99)
HCT VFR BLD CALC: 36.4 % — LOW (ref 42–52)
HGB BLD-MCNC: 12 G/DL — LOW (ref 14–18)
IMM GRANULOCYTES NFR BLD AUTO: 0.6 % — HIGH (ref 0.1–0.3)
LYMPHOCYTES # BLD AUTO: 1.98 K/UL — SIGNIFICANT CHANGE UP (ref 1.2–3.4)
LYMPHOCYTES # BLD AUTO: 17.6 % — LOW (ref 20.5–51.1)
MAGNESIUM SERPL-MCNC: 2 MG/DL — SIGNIFICANT CHANGE UP (ref 1.8–2.4)
MCHC RBC-ENTMCNC: 30.7 PG — SIGNIFICANT CHANGE UP (ref 27–31)
MCHC RBC-ENTMCNC: 33 G/DL — SIGNIFICANT CHANGE UP (ref 32–37)
MCV RBC AUTO: 93.1 FL — SIGNIFICANT CHANGE UP (ref 80–94)
MONOCYTES # BLD AUTO: 1.22 K/UL — HIGH (ref 0.1–0.6)
MONOCYTES NFR BLD AUTO: 10.8 % — HIGH (ref 1.7–9.3)
MRSA PCR RESULT.: NEGATIVE — SIGNIFICANT CHANGE UP
NEUTROPHILS # BLD AUTO: 7.42 K/UL — HIGH (ref 1.4–6.5)
NEUTROPHILS NFR BLD AUTO: 66 % — SIGNIFICANT CHANGE UP (ref 42.2–75.2)
NRBC # BLD: 0 /100 WBCS — SIGNIFICANT CHANGE UP (ref 0–0)
PHOSPHATE SERPL-MCNC: 3.9 MG/DL — SIGNIFICANT CHANGE UP (ref 2.1–4.9)
PLATELET # BLD AUTO: 461 K/UL — HIGH (ref 130–400)
PMV BLD: 9.3 FL — SIGNIFICANT CHANGE UP (ref 7.4–10.4)
POTASSIUM SERPL-MCNC: 4.2 MMOL/L — SIGNIFICANT CHANGE UP (ref 3.5–5)
POTASSIUM SERPL-SCNC: 4.2 MMOL/L — SIGNIFICANT CHANGE UP (ref 3.5–5)
PROCALCITONIN SERPL-MCNC: 0.16 NG/ML — HIGH (ref 0.02–0.1)
PROT SERPL-MCNC: 5.8 G/DL — LOW (ref 6–8)
RBC # BLD: 3.91 M/UL — LOW (ref 4.7–6.1)
RBC # FLD: 12.3 % — SIGNIFICANT CHANGE UP (ref 11.5–14.5)
SODIUM SERPL-SCNC: 133 MMOL/L — LOW (ref 135–146)
WBC # BLD: 11.25 K/UL — HIGH (ref 4.8–10.8)
WBC # FLD AUTO: 11.25 K/UL — HIGH (ref 4.8–10.8)

## 2024-12-31 PROCEDURE — 71045 X-RAY EXAM CHEST 1 VIEW: CPT | Mod: 26

## 2024-12-31 PROCEDURE — 99233 SBSQ HOSP IP/OBS HIGH 50: CPT

## 2024-12-31 PROCEDURE — 99291 CRITICAL CARE FIRST HOUR: CPT

## 2024-12-31 PROCEDURE — 93010 ELECTROCARDIOGRAM REPORT: CPT

## 2024-12-31 RX ORDER — MAGNESIUM SULFATE 500 MG/ML
1 INJECTION, SOLUTION INTRAMUSCULAR; INTRAVENOUS ONCE
Refills: 0 | Status: COMPLETED | OUTPATIENT
Start: 2024-12-31 | End: 2024-12-31

## 2024-12-31 RX ADMIN — AMIODARONE HYDROCHLORIDE 400 MILLIGRAM(S): 200 TABLET ORAL at 05:34

## 2024-12-31 RX ADMIN — Medication 5 MILLIGRAM(S): at 21:34

## 2024-12-31 RX ADMIN — NICOTINE POLACRILEX 1 PATCH: 4 LOZENGE ORAL at 00:05

## 2024-12-31 RX ADMIN — INSULIN GLARGINE-YFGN 27 UNIT(S): 100 INJECTION, SOLUTION SUBCUTANEOUS at 21:38

## 2024-12-31 RX ADMIN — NICOTINE POLACRILEX 1 PATCH: 4 LOZENGE ORAL at 06:50

## 2024-12-31 RX ADMIN — HEPARIN SODIUM 1600 UNIT(S)/HR: 1000 INJECTION, SOLUTION INTRAVENOUS; SUBCUTANEOUS at 21:39

## 2024-12-31 RX ADMIN — Medication 10 MILLILITER(S): at 17:01

## 2024-12-31 RX ADMIN — AMIODARONE HYDROCHLORIDE 400 MILLIGRAM(S): 200 TABLET ORAL at 17:01

## 2024-12-31 RX ADMIN — Medication 12.5 MILLIGRAM(S): at 17:01

## 2024-12-31 RX ADMIN — TICAGRELOR 90 MILLIGRAM(S): 60 TABLET ORAL at 05:34

## 2024-12-31 RX ADMIN — NICOTINE POLACRILEX 1 PATCH: 4 LOZENGE ORAL at 01:06

## 2024-12-31 RX ADMIN — Medication 9 UNIT(S): at 16:23

## 2024-12-31 RX ADMIN — PANTOPRAZOLE 40 MILLIGRAM(S): 40 TABLET, DELAYED RELEASE ORAL at 08:29

## 2024-12-31 RX ADMIN — BUMETANIDE 2 MILLIGRAM(S): 2 TABLET ORAL at 21:34

## 2024-12-31 RX ADMIN — MAGNESIUM SULFATE 100 GRAM(S): 500 INJECTION, SOLUTION INTRAMUSCULAR; INTRAVENOUS at 00:58

## 2024-12-31 RX ADMIN — NICOTINE POLACRILEX 1 PATCH: 4 LOZENGE ORAL at 20:15

## 2024-12-31 RX ADMIN — TICAGRELOR 90 MILLIGRAM(S): 60 TABLET ORAL at 17:01

## 2024-12-31 RX ADMIN — BUMETANIDE 2 MILLIGRAM(S): 2 TABLET ORAL at 14:16

## 2024-12-31 RX ADMIN — Medication 12.5 MILLIGRAM(S): at 00:57

## 2024-12-31 RX ADMIN — BUMETANIDE 2 MILLIGRAM(S): 2 TABLET ORAL at 05:34

## 2024-12-31 NOTE — PROGRESS NOTE ADULT - ASSESSMENT
45 YO M with PMHx of HTN, IDDM, HLD, active smoker (1/2 PPD x 28 yrs), hepatitis B (s/p treatment) presented with CC chest pressure and SOB.    TTE 12/27/24: EF 25-30%. Multiple RWMA. GIIDD. Small, fixed, anteroapical left ventricular thrombus. Right ventricular apical hypokinesis. Mild MR.     IMPRESSION  #Late presentation anterior MI   #Acute HFrEF, ICM, EF 25-30%  #Sustained monomorphic slow Vtach; 12 min overnight on 12/28 with rate ~ 140s  #Lv thrombus  #Acute hypoxic resp failure /mild ARDS COVID +ve  #HTN  #HLD  #IDDM w/ hyperglycemia  #Active smoker  #Alcohol and drug (Timothy dust) abuse         PLAN    CNS:   Patient anxious about moving to [place],   No pain    PULMONARY:  CT chest with GGO and small right pleural effusion.   -xray 12-30: bilateral effusion with bilateral opacities  - monitor off steroids as oxygen requirements are not worsening and had recent MI  - wean off o2 as tolerated. HFNC at MN  - sputum culture    CARDIOVASCULAR:   - Continue heparin gtt. Requires long term anticoagulation for LV thrombus   - continue ASA 81 QD and ticagrelor 90mg BID.  -start losartan 25 mg daily, discontinue Isordil  - Metoprolol 12.5mg PO Q6  -s/p lidocaine   -c/w amiodarone 400 mg BID PO. today day 2  - bumex 2 TID. target negative 2 L  -LHC once optimized. Planned for AM. NPO after MN  - Needs AICD/ Life vest before DC    GI:   - Pantoprazole 40mg IV QD.    RENAL:  - Follow up lytes. Correct as needed.  - continue bumex 2 TID    INFECTIOUS DISEASE:  -COVID + with ARDS  -No dexamethasone for now as patient is post MI  - Blood cultures and MRSA negative  - Monitor off abx.  -F/u CRP - ferritin    HEMATOLOGICAL:    -LV yhrombus  - Heparin drip full AC   - monitor PTT. platelets    ENDOCRINE:    -TSH 0.99  -A1c 10.8  -  - on lantus 27 and lispro of 9    MUSCULOSKELETAL:   - AAT  -OOTC    Disposition: CCU   43 YO M with PMHx of HTN, IDDM, HLD, active smoker (1/2 PPD x 28 yrs), hepatitis B (s/p treatment) presented with CC chest pressure and SOB.    TTE 12/27/24: EF 25-30%. Multiple RWMA. GIIDD. Small, fixed, anteroapical left ventricular thrombus. Right ventricular apical hypokinesis. Mild MR.     IMPRESSION  #Late presentation anterior MI   #Acute HFrEF, ICM, EF 25-30%  #Sustained monomorphic slow Vtach; 12 min overnight on 12/28 with rate ~ 140s  #Lv thrombus  #Acute hypoxic resp failure /mild ARDS COVID +ve  #HTN  #HLD  #IDDM w/ hyperglycemia  #Active smoker  #Alcohol and drug (Timothy dust) abuse         PLAN    CNS:   [place],   No pain    PULMONARY:  CT chest with GGO and small right pleural effusion.   -xray 12-30: bilateral effusion with bilateral opacities  - monitor off steroids as oxygen requirements are not worsening and had recent MI  - wean off o2 as tolerated. HFNC at MN  - sputum culture  - ABG. if paO2 worsening will consider the dexamethasone.   - Trend CRP and ferritin    CARDIOVASCULAR:   - Continue heparin gtt. Requires long term anticoagulation for LV thrombus   - continue ASA 81 QD and ticagrelor 90mg BID.  -start losartan 25 mg daily, discontinue Isordil  - Metoprolol 12.5mg PO Q6  -s/p lidocaine   -c/w amiodarone 400 mg BID PO. today day 2  - c/w bumex 2 TID. target negative 2 L  -LHC once optimized.  - Needs AICD/ Life vest before DC    GI:   - Pantoprazole 40mg IV QD.    RENAL:  - Follow up lytes. Correct as needed.  - continue bumex 2 TID    INFECTIOUS DISEASE:  -COVID + with ARDS  -No dexamethasone for now as patient is post MI  - Blood cultures and MRSA negative  - Monitor off abx.  - Trend CRP and ferritin    HEMATOLOGICAL:    -LV Thrombus  - Heparin drip full AC   - monitor PTT. platelets    ENDOCRINE:    -TSH 0.99  -A1c 10.8  -  - on lantus 27 and lispro of 9    MUSCULOSKELETAL:   - AAT  -OOTC    Disposition: CCU

## 2024-12-31 NOTE — PROGRESS NOTE ADULT - SUBJECTIVE AND OBJECTIVE BOX
Patient is a 44y old  Male who presents with a chief complaint of Acute ischemic heart disease     (28 Dec 2024 22:59)      INTERVAL HPI/OVERNIGHT EVENTS:   No overnight events. Patient Feels anxious and is still orthopneic. coughing up yellow sputum, ROS was otherwise negative. HD stable, afebrile, HFNC 40L, 40%    ICU Vital Signs Last 24 Hrs  T(C): 36 (31 Dec 2024 08:00), Max: 36.9 (30 Dec 2024 20:00)  T(F): 96.8 (31 Dec 2024 08:00), Max: 98.5 (30 Dec 2024 20:00)  HR: 73 (31 Dec 2024 10:00) (68 - 85)  BP: 102/69 (31 Dec 2024 10:00) (86/53 - 126/76)  BP(mean): 80 (31 Dec 2024 10:00) (65 - 96)  ABP: --  ABP(mean): --  RR: 24 (31 Dec 2024 10:00) (22 - 32)  SpO2: 98% (31 Dec 2024 10:00) (93% - 98%)    O2 Parameters below as of 31 Dec 2024 10:00  Patient On (Oxygen Delivery Method): nasal cannula w/ humidification  O2 Flow (L/min): 40  O2 Concentration (%): 40      I&O's Summary    30 Dec 2024 07:01  -  31 Dec 2024 07:00  --------------------------------------------------------  IN: 414 mL / OUT: 3150 mL / NET: -2736 mL    31 Dec 2024 07:01  -  31 Dec 2024 10:06  --------------------------------------------------------  IN: 18 mL / OUT: 700 mL / NET: -682 mL          LABS:                        12.0   11.25 )-----------( 461      ( 31 Dec 2024 05:20 )             36.4     12-31    133[L]  |  96[L]  |  15  ----------------------------<  156[H]  4.2   |  23  |  0.7    Ca    8.2[L]      31 Dec 2024 05:20  Phos  3.9     12-31  Mg     2.0     12-31    TPro  5.8[L]  /  Alb  2.6[L]  /  TBili  0.2  /  DBili  x   /  AST  27  /  ALT  26  /  AlkPhos  153[H]  12-31    PTT - ( 31 Dec 2024 05:20 )  PTT:100.3 sec  Urinalysis Basic - ( 31 Dec 2024 05:20 )    Color: x / Appearance: x / SG: x / pH: x  Gluc: 156 mg/dL / Ketone: x  / Bili: x / Urobili: x   Blood: x / Protein: x / Nitrite: x   Leuk Esterase: x / RBC: x / WBC x   Sq Epi: x / Non Sq Epi: x / Bacteria: x      CAPILLARY BLOOD GLUCOSE      POCT Blood Glucose.: 144 mg/dL (31 Dec 2024 06:16)  POCT Blood Glucose.: 176 mg/dL (30 Dec 2024 21:50)  POCT Blood Glucose.: 183 mg/dL (30 Dec 2024 16:14)  POCT Blood Glucose.: 208 mg/dL (30 Dec 2024 11:45)    ABG - ( 30 Dec 2024 14:42 )  pH, Arterial: 7.50  pH, Blood: x     /  pCO2: 34    /  pO2: 79    / HCO3: 26    / Base Excess: 3.6   /  SaO2: 96.6                RADIOLOGY & ADDITIONAL TESTS:    Consultant(s) Notes Reviewed:  [x ] YES  [ ] NO    MEDICATIONS  (STANDING):  aMIOdarone    Tablet 400 milliGRAM(s) Oral two times a day  aspirin  chewable 81 milliGRAM(s) Oral daily  buMETAnide Injectable 2 milliGRAM(s) IV Push every 8 hours  chlorhexidine 2% Cloths 1 Application(s) Topical daily  glucagon  Injectable 1 milliGRAM(s) IntraMuscular once  guaifenesin/dextromethorphan Oral Liquid 10 milliLiter(s) Oral every 4 hours  heparin  Infusion.  Unit(s)/Hr (18 mL/Hr) IV Continuous <Continuous>  insulin glargine Injectable (LANTUS) 27 Unit(s) SubCutaneous at bedtime  insulin lispro (ADMELOG) corrective regimen sliding scale   SubCutaneous three times a day before meals  insulin lispro Injectable (ADMELOG) 9 Unit(s) SubCutaneous three times a day before meals  losartan 25 milliGRAM(s) Oral daily  melatonin 5 milliGRAM(s) Oral at bedtime  metoprolol tartrate 12.5 milliGRAM(s) Oral every 6 hours  nicotine -  14 mG/24Hr(s) Patch 1 Patch Transdermal every 24 hours  pantoprazole  Injectable 40 milliGRAM(s) IV Push with breakfast  ticagrelor 90 milliGRAM(s) Oral every 12 hours    MEDICATIONS  (PRN):  acetaminophen     Tablet .. 650 milliGRAM(s) Oral every 6 hours PRN Mild Pain (1 - 3)  benzocaine/menthol Lozenge 1 Lozenge Oral every 3 hours PRN Sore Throat  heparin   Injectable 8000 Unit(s) IV Push every 6 hours PRN For aPTT less than 40  heparin   Injectable 4000 Unit(s) IV Push every 6 hours PRN For aPTT between 40 - 57      PHYSICAL EXAM:  GENERAL:   HEAD:  Atraumatic, Normocephalic  EYES: EOMI, PERRLA, conjunctiva and sclera clear  NECK: Supple, No JVD, Normal thyroid, no enlarged nodes  NERVOUS SYSTEM:  Alert & Awake.   CHEST/LUNG: B/L good air entry; No rales, rhonchi, or wheezing  HEART: S1S2 normal, no S3, Regular rate and rhythm; No murmurs  ABDOMEN: Soft, Nontender, Nondistended; Bowel sounds present  EXTREMITIES:  2+ Peripheral Pulses, No clubbing, cyanosis, or edema  LYMPH: No lymphadenopathy noted  SKIN: No rashes or lesions    Care Discussed with Consultants/Other Providers [ x] YES  [ ] NO Patient is a 44y old  Male who presents with a chief complaint of Acute ischemic heart disease     (28 Dec 2024 22:59)      INTERVAL HPI/OVERNIGHT EVENTS:   No overnight events. Patient Feels anxious and is still orthopneic. coughing up yellow sputum, complains of leg swelling but otherwise says his breathing is doing better. No other complaints. ROs was negative. HD stable, afebrile, HFNC 40L, 40%.      ICU Vital Signs Last 24 Hrs  T(C): 36 (31 Dec 2024 08:00), Max: 36.9 (30 Dec 2024 20:00)  T(F): 96.8 (31 Dec 2024 08:00), Max: 98.5 (30 Dec 2024 20:00)  HR: 73 (31 Dec 2024 10:00) (68 - 85)  BP: 102/69 (31 Dec 2024 10:00) (86/53 - 126/76)  BP(mean): 80 (31 Dec 2024 10:00) (65 - 96)  ABP: --  ABP(mean): --  RR: 24 (31 Dec 2024 10:00) (22 - 32)  SpO2: 98% (31 Dec 2024 10:00) (93% - 98%)    O2 Parameters below as of 31 Dec 2024 10:00  Patient On (Oxygen Delivery Method): nasal cannula w/ humidification  O2 Flow (L/min): 40  O2 Concentration (%): 40      I&O's Summary    30 Dec 2024 07:01  -  31 Dec 2024 07:00  --------------------------------------------------------  IN: 414 mL / OUT: 3150 mL / NET: -2736 mL    31 Dec 2024 07:01  -  31 Dec 2024 10:06  --------------------------------------------------------  IN: 18 mL / OUT: 700 mL / NET: -682 mL          LABS:                        12.0   11.25 )-----------( 461      ( 31 Dec 2024 05:20 )             36.4     12-31    133[L]  |  96[L]  |  15  ----------------------------<  156[H]  4.2   |  23  |  0.7    Ca    8.2[L]      31 Dec 2024 05:20  Phos  3.9     12-31  Mg     2.0     12-31    TPro  5.8[L]  /  Alb  2.6[L]  /  TBili  0.2  /  DBili  x   /  AST  27  /  ALT  26  /  AlkPhos  153[H]  12-31    PTT - ( 31 Dec 2024 05:20 )  PTT:100.3 sec  Urinalysis Basic - ( 31 Dec 2024 05:20 )    Color: x / Appearance: x / SG: x / pH: x  Gluc: 156 mg/dL / Ketone: x  / Bili: x / Urobili: x   Blood: x / Protein: x / Nitrite: x   Leuk Esterase: x / RBC: x / WBC x   Sq Epi: x / Non Sq Epi: x / Bacteria: x      CAPILLARY BLOOD GLUCOSE      POCT Blood Glucose.: 144 mg/dL (31 Dec 2024 06:16)  POCT Blood Glucose.: 176 mg/dL (30 Dec 2024 21:50)  POCT Blood Glucose.: 183 mg/dL (30 Dec 2024 16:14)  POCT Blood Glucose.: 208 mg/dL (30 Dec 2024 11:45)    ABG - ( 30 Dec 2024 14:42 )  pH, Arterial: 7.50  pH, Blood: x     /  pCO2: 34    /  pO2: 79    / HCO3: 26    / Base Excess: 3.6   /  SaO2: 96.6                RADIOLOGY & ADDITIONAL TESTS:    Consultant(s) Notes Reviewed:  [x ] YES  [ ] NO    MEDICATIONS  (STANDING):  aMIOdarone    Tablet 400 milliGRAM(s) Oral two times a day  aspirin  chewable 81 milliGRAM(s) Oral daily  buMETAnide Injectable 2 milliGRAM(s) IV Push every 8 hours  chlorhexidine 2% Cloths 1 Application(s) Topical daily  glucagon  Injectable 1 milliGRAM(s) IntraMuscular once  guaifenesin/dextromethorphan Oral Liquid 10 milliLiter(s) Oral every 4 hours  heparin  Infusion.  Unit(s)/Hr (18 mL/Hr) IV Continuous <Continuous>  insulin glargine Injectable (LANTUS) 27 Unit(s) SubCutaneous at bedtime  insulin lispro (ADMELOG) corrective regimen sliding scale   SubCutaneous three times a day before meals  insulin lispro Injectable (ADMELOG) 9 Unit(s) SubCutaneous three times a day before meals  losartan 25 milliGRAM(s) Oral daily  melatonin 5 milliGRAM(s) Oral at bedtime  metoprolol tartrate 12.5 milliGRAM(s) Oral every 6 hours  nicotine -  14 mG/24Hr(s) Patch 1 Patch Transdermal every 24 hours  pantoprazole  Injectable 40 milliGRAM(s) IV Push with breakfast  ticagrelor 90 milliGRAM(s) Oral every 12 hours    MEDICATIONS  (PRN):  acetaminophen     Tablet .. 650 milliGRAM(s) Oral every 6 hours PRN Mild Pain (1 - 3)  benzocaine/menthol Lozenge 1 Lozenge Oral every 3 hours PRN Sore Throat  heparin   Injectable 8000 Unit(s) IV Push every 6 hours PRN For aPTT less than 40  heparin   Injectable 4000 Unit(s) IV Push every 6 hours PRN For aPTT between 40 - 57      PHYSICAL EXAM:  GENERAL:   HEAD:  Atraumatic, Normocephalic  EYES: EOMI, PERRLA, conjunctiva and sclera clear  NECK: Supple, No JVD, Normal thyroid, no enlarged nodes  NERVOUS SYSTEM:  Alert & Awake.   CHEST/LUNG: B/L good air entry; No rales, rhonchi, or wheezing  HEART: S1S2 normal, no S3, Regular rate and rhythm; No murmurs  ABDOMEN: Soft, Nontender, Nondistended; Bowel sounds present  EXTREMITIES:  2+ Peripheral Pulses, No clubbing, cyanosis, or edema  LYMPH: No lymphadenopathy noted  SKIN: No rashes or lesions    Care Discussed with Consultants/Other Providers [ x] YES  [ ] NO Patient is a 44y old  Male who presents with a chief complaint of Acute ischemic heart disease     (28 Dec 2024 22:59)      INTERVAL HPI/OVERNIGHT EVENTS:   No overnight events. Patient Feels anxious and is still orthopneic. coughing up yellow sputum, complains of leg swelling but otherwise says his breathing is doing better. No other complaints. ROs was negative. HD stable, afebrile, HFNC 40L, 40%.      ICU Vital Signs Last 24 Hrs  T(C): 36 (31 Dec 2024 08:00), Max: 36.9 (30 Dec 2024 20:00)  T(F): 96.8 (31 Dec 2024 08:00), Max: 98.5 (30 Dec 2024 20:00)  HR: 73 (31 Dec 2024 10:00) (68 - 85)  BP: 102/69 (31 Dec 2024 10:00) (86/53 - 126/76)  BP(mean): 80 (31 Dec 2024 10:00) (65 - 96)  ABP: --  ABP(mean): --  RR: 24 (31 Dec 2024 10:00) (22 - 32)  SpO2: 98% (31 Dec 2024 10:00) (93% - 98%)    O2 Parameters below as of 31 Dec 2024 10:00  Patient On (Oxygen Delivery Method): nasal cannula w/ humidification  O2 Flow (L/min): 40  O2 Concentration (%): 40      I&O's Summary    30 Dec 2024 07:01  -  31 Dec 2024 07:00  --------------------------------------------------------  IN: 414 mL / OUT: 3150 mL / NET: -2736 mL    31 Dec 2024 07:01  -  31 Dec 2024 10:06  --------------------------------------------------------  IN: 18 mL / OUT: 700 mL / NET: -682 mL          LABS:                        12.0   11.25 )-----------( 461      ( 31 Dec 2024 05:20 )             36.4     12-31    133[L]  |  96[L]  |  15  ----------------------------<  156[H]  4.2   |  23  |  0.7    Ca    8.2[L]      31 Dec 2024 05:20  Phos  3.9     12-31  Mg     2.0     12-31    TPro  5.8[L]  /  Alb  2.6[L]  /  TBili  0.2  /  DBili  x   /  AST  27  /  ALT  26  /  AlkPhos  153[H]  12-31    PTT - ( 31 Dec 2024 05:20 )  PTT:100.3 sec  Urinalysis Basic - ( 31 Dec 2024 05:20 )    Color: x / Appearance: x / SG: x / pH: x  Gluc: 156 mg/dL / Ketone: x  / Bili: x / Urobili: x   Blood: x / Protein: x / Nitrite: x   Leuk Esterase: x / RBC: x / WBC x   Sq Epi: x / Non Sq Epi: x / Bacteria: x      CAPILLARY BLOOD GLUCOSE      POCT Blood Glucose.: 144 mg/dL (31 Dec 2024 06:16)  POCT Blood Glucose.: 176 mg/dL (30 Dec 2024 21:50)  POCT Blood Glucose.: 183 mg/dL (30 Dec 2024 16:14)  POCT Blood Glucose.: 208 mg/dL (30 Dec 2024 11:45)    ABG - ( 30 Dec 2024 14:42 )  pH, Arterial: 7.50  pH, Blood: x     /  pCO2: 34    /  pO2: 79    / HCO3: 26    / Base Excess: 3.6   /  SaO2: 96.6                RADIOLOGY & ADDITIONAL TESTS:    Consultant(s) Notes Reviewed:  [x ] YES  [ ] NO    MEDICATIONS  (STANDING):  aMIOdarone    Tablet 400 milliGRAM(s) Oral two times a day  aspirin  chewable 81 milliGRAM(s) Oral daily  buMETAnide Injectable 2 milliGRAM(s) IV Push every 8 hours  chlorhexidine 2% Cloths 1 Application(s) Topical daily  glucagon  Injectable 1 milliGRAM(s) IntraMuscular once  guaifenesin/dextromethorphan Oral Liquid 10 milliLiter(s) Oral every 4 hours  heparin  Infusion.  Unit(s)/Hr (18 mL/Hr) IV Continuous <Continuous>  insulin glargine Injectable (LANTUS) 27 Unit(s) SubCutaneous at bedtime  insulin lispro (ADMELOG) corrective regimen sliding scale   SubCutaneous three times a day before meals  insulin lispro Injectable (ADMELOG) 9 Unit(s) SubCutaneous three times a day before meals  losartan 25 milliGRAM(s) Oral daily  melatonin 5 milliGRAM(s) Oral at bedtime  metoprolol tartrate 12.5 milliGRAM(s) Oral every 6 hours  nicotine -  14 mG/24Hr(s) Patch 1 Patch Transdermal every 24 hours  pantoprazole  Injectable 40 milliGRAM(s) IV Push with breakfast  ticagrelor 90 milliGRAM(s) Oral every 12 hours    MEDICATIONS  (PRN):  acetaminophen     Tablet .. 650 milliGRAM(s) Oral every 6 hours PRN Mild Pain (1 - 3)  benzocaine/menthol Lozenge 1 Lozenge Oral every 3 hours PRN Sore Throat  heparin   Injectable 8000 Unit(s) IV Push every 6 hours PRN For aPTT less than 40  heparin   Injectable 4000 Unit(s) IV Push every 6 hours PRN For aPTT between 40 - 57      PHYSICAL EXAM:  GENERAL:  on the chair, comfortable, on HFNC  NECK: Supple, No JVD, No HJR  CHEST/LUNG: B/L good air entry, no crackles  HEART: S1S2 normal, Regular rate and rhythm; No murmurs  ABDOMEN: Soft, Nontender, Nondistended; Bowel sounds present  EXTREMITIES:  2+ Peripheral Pulses, 1-2+ pitting edema   Care Discussed with Consultants/Other Providers [ x] YES  [ ] NO

## 2024-12-31 NOTE — PROGRESS NOTE ADULT - ASSESSMENT
Impression:     NSTEMI   HFREF with severely reduced EF   VTACH s/p cardioversion   LV thrombus   COVID pneumonia   Acute hypoxemic respiratory failure     IMPRESSION:      PLAN:    CNS: MS adequate.     HEENT: Oral care    PULMONARY:  Comfortable on HFNC 40/40. Clinically better. CXR slightly improved.     CARDIOVASCULAR: Daily IVC. Diuretics per cardiology; possible cath today.     GI: GI prophylaxis.  Feeding as tolerated.     RENAL:  Follow up lytes.  Correct as needed. Kidney at baseline. No davis.     INFECTIOUS DISEASE: Afebrile. blood culture negative. Check sputum culture. COVID positive. ID evaluation noted. Procal 0.15. No steroids because of the late MI presentation.     HEMATOLOGICAL:  On therapeutic AC. No drop in hg.     ENDOCRINE:  Follow up FS.  Insulin protocol if needed.     MUSCULOSKELETAL: Out of bed.     CCU care.

## 2024-12-31 NOTE — PROGRESS NOTE ADULT - SUBJECTIVE AND OBJECTIVE BOX
SARAH FARMER  44y, Male  Allergy: Seafood (Urticaria)  No Known Drug Allergies  shellfish (Urticaria; Rash; Short breath)      LOS  5d    CHIEF COMPLAINT: Acute ischemic heart disease     (28 Dec 2024 22:59)      INTERVAL EVENTS/HPI  - No acute events overnight  - T(F): , Max: 98.5 (12-30-24 @ 20:00)  - weaned to nasal canula, but then started on NRB/HFNC on 12/29   - no fevers, continued coughing with now clear sputum   - WBC Count: 11.25 (12-31-24 @ 05:20)  WBC Count: 11.13 (12-30-24 @ 05:01)     - Creatinine: 0.7 (12-31-24 @ 05:20)  Creatinine: 0.7 (12-30-24 @ 21:02)       ROS  General: Denies rigors, nightsweats  HEENT: Denies headache, rhinorrhea, sore throat, eye pain  CV: Denies CP, palpitations  PULM: Denies wheezing, hemoptysis  GI: Denies hematemesis, hematochezia, melena  : Denies discharge, hematuria  MSK: Denies arthralgias, myalgias  SKIN: Denies rash, lesions  NEURO: Denies paresthesias, weakness  PSYCH: Denies depression, anxiety    VITALS:  T(F): 96.8, Max: 98.5 (12-30-24 @ 20:00)  HR: 73  BP: 97/54  RR: 22Vital Signs Last 24 Hrs  T(C): 36 (31 Dec 2024 08:00), Max: 36.9 (30 Dec 2024 20:00)  T(F): 96.8 (31 Dec 2024 08:00), Max: 98.5 (30 Dec 2024 20:00)  HR: 73 (31 Dec 2024 08:00) (68 - 85)  BP: 97/54 (31 Dec 2024 08:00) (86/53 - 126/76)  BP(mean): 71 (31 Dec 2024 08:00) (65 - 96)  RR: 22 (31 Dec 2024 08:00) (22 - 36)  SpO2: 96% (31 Dec 2024 08:00) (93% - 100%)    Parameters below as of 31 Dec 2024 09:00  Patient On (Oxygen Delivery Method): nasal cannula, high flow  O2 Flow (L/min): 40  O2 Concentration (%): 40    PHYSICAL EXAM:  Gen: NAD, resting in bed  HEENT: Normocephalic, atraumatic  Neck: supple, no lymphadenopathy  CV: Regular rate & regular rhythm  Lungs: decreased BS at bases, no fremitus  Abdomen: Soft, BS present  Ext: Warm, well perfused  Neuro: non focal, awake  Skin: no rash, no erythema  Lines: no phlebitis    FH: Non-contributory  Social Hx: Non-contributory    TESTS & MEASUREMENTS:                        12.0   11.25 )-----------( 461      ( 31 Dec 2024 05:20 )             36.4     12-31    133[L]  |  96[L]  |  15  ----------------------------<  156[H]  4.2   |  23  |  0.7    Ca    8.2[L]      31 Dec 2024 05:20  Phos  3.9     12-31  Mg     2.0     12-31    TPro  5.8[L]  /  Alb  2.6[L]  /  TBili  0.2  /  DBili  x   /  AST  27  /  ALT  26  /  AlkPhos  153[H]  12-31      LIVER FUNCTIONS - ( 31 Dec 2024 05:20 )  Alb: 2.6 g/dL / Pro: 5.8 g/dL / ALK PHOS: 153 U/L / ALT: 26 U/L / AST: 27 U/L / GGT: x           Urinalysis Basic - ( 31 Dec 2024 05:20 )    Color: x / Appearance: x / SG: x / pH: x  Gluc: 156 mg/dL / Ketone: x  / Bili: x / Urobili: x   Blood: x / Protein: x / Nitrite: x   Leuk Esterase: x / RBC: x / WBC x   Sq Epi: x / Non Sq Epi: x / Bacteria: x        Culture - Blood (collected 12-26-24 @ 23:37)  Source: .Blood BLOOD  Preliminary Report (12-31-24 @ 07:00):    No growth at 4 days    Culture - Blood (collected 12-26-24 @ 23:37)  Source: .Blood BLOOD  Preliminary Report (12-31-24 @ 07:00):    No growth at 4 days        Blood Gas Venous - Lactate: 1.6 mmol/L (12-26-24 @ 22:20)      INFECTIOUS DISEASES TESTING  MRSA PCR Result.: Negative (12-30-24 @ 22:36)  Procalcitonin: 0.16 (12-30-24 @ 10:40)      INFLAMMATORY MARKERS  Sedimentation Rate, Erythrocyte: 120 mm/Hr (12-26-24 @ 22:35)  C-Reactive Protein: 91.0 mg/L (12-26-24 @ 22:35)      RADIOLOGY & ADDITIONAL TESTS:  I have personally reviewed the last available Chest xray  CXR      CT  CT Chest No Cont:   ACC: [id number] EXAM:  CT CHEST   ORDERED BY: RENÉE BENSON     PROCEDURE DATE:  12/28/2024          INTERPRETATION:  CLINICAL INFORMATION: Respiratory distress    COMPARISON: None.    CONTRAST/COMPLICATIONS:  IV Contrast: NONE  Oral Contrast: NONE  .    No mediastinal or hilar lymphadenopathy or mass formation is seen.  There is scattered bilateral areas of consolidation with air bronchogram   formation with a background of groundglass opacification and bilateral   pleural effusions. No cyst formation abscess formation or bronchiectasis   is seen. The tracheobronchial tree appears patent. There is no air leak.   These findings are consistent with ARDS.  The bony and soft tissue structures appear intact.    IMPRESSION: Findings consistent with ARDS        --- End of Report ---            JAVIER WARNER MD; Attending Radiologist  This document has been electronically signed. Dec 28 2024  5:42PM (12-28-24 @ 16:56)      CARDIOLOGY TESTING  12 Lead ECG:   Ventricular Rate 70 BPM    Atrial Rate 70 BPM    P-R Interval 180 ms    QRS Duration 100 ms    Q-T Interval 486 ms    QTC Calculation(Bazett) 524 ms    P Axis 58 degrees    R Axis 105 degrees    T Axis 64 degrees    Diagnosis Line Normal sinus rhythm  Anterolateral infarct , age undetermined  Prolonged QT  Abnormal ECG    Confirmed by Sandra Bush MD ([id number]) on 12/29/2024 6:30:15 PM (12-29-24 @ 05:42)  12 Lead ECG:   Ventricular Rate 90 BPM    Atrial Rate 90 BPM    P-R Interval 178 ms    QRS Duration 88 ms    Q-T Interval 408 ms    QTC Calculation(Bazett) 499 ms    P Axis 69 degrees    R Axis 136 degrees    T Axis -46 degrees    Diagnosis Line Normal sinus rhythm  Anterolateral infarct , age undetermined  Prolonged QT  Abnormal ECG    Confirmed by Sandra Bush MD ([id number]) on 12/28/2024 6:48:16 PM (12-28-24 @ 08:44)      MEDICATIONS  aMIOdarone    Tablet 400 Oral two times a day  aspirin  chewable 81 Oral daily  buMETAnide Injectable 2 IV Push every 8 hours  chlorhexidine 2% Cloths 1 Topical daily  glucagon  Injectable 1 IntraMuscular once  guaifenesin/dextromethorphan Oral Liquid 10 Oral every 4 hours  heparin  Infusion.  IV Continuous <Continuous>  insulin glargine Injectable (LANTUS) 27 SubCutaneous at bedtime  insulin lispro (ADMELOG) corrective regimen sliding scale  SubCutaneous three times a day before meals  insulin lispro Injectable (ADMELOG) 9 SubCutaneous three times a day before meals  losartan 25 Oral daily  melatonin 5 Oral at bedtime  metoprolol tartrate 12.5 Oral every 6 hours  nicotine -  14 mG/24Hr(s) Patch 1 Transdermal every 24 hours  pantoprazole  Injectable 40 IV Push with breakfast  ticagrelor 90 Oral every 12 hours      WEIGHT  Weight (kg): 96 (12-27-24 @ 01:00)  Creatinine: 0.7 mg/dL (12-31-24 @ 05:20)  Creatinine: 0.7 mg/dL (12-30-24 @ 21:02)      ANTIBIOTICS:      All available historical records have been reviewed

## 2024-12-31 NOTE — PROGRESS NOTE ADULT - SUBJECTIVE AND OBJECTIVE BOX
Patient is a 44y old  Male who presents with a chief complaint of Acute ischemic heart disease     (28 Dec 2024 22:59)        Over Night Events:    No fevers   HFNC 40/40   O2 sat adequate         ROS:  See HPI    PHYSICAL EXAM    ICU Vital Signs Last 24 Hrs  T(C): 36 (31 Dec 2024 08:00), Max: 36.9 (30 Dec 2024 20:00)  T(F): 96.8 (31 Dec 2024 08:00), Max: 98.5 (30 Dec 2024 20:00)  HR: 73 (31 Dec 2024 08:00) (68 - 85)  BP: 97/54 (31 Dec 2024 08:00) (86/53 - 126/76)  BP(mean): 71 (31 Dec 2024 08:00) (65 - 96)  ABP: --  ABP(mean): --  RR: 22 (31 Dec 2024 08:00) (22 - 36)  SpO2: 96% (31 Dec 2024 08:00) (93% - 100%)    O2 Parameters below as of 31 Dec 2024 09:00  Patient On (Oxygen Delivery Method): nasal cannula, high flow  O2 Flow (L/min): 40  O2 Concentration (%): 40        General: NAD   HEENT: BART             Lymphatic system: No cervical LN   Lungs: Bilateral BS, coarse   Cardiovascular: Regular   Gastrointestinal: Soft, Positive BS  Extremities: No clubbing.  Moves extremities.  Full Range of motion   Skin: Warm, intact  Neurological: No motor or sensory deficit       12-30-24 @ 07:01  -  12-31-24 @ 07:00  --------------------------------------------------------  IN:    Heparin Infusion: 414 mL  Total IN: 414 mL    OUT:    Voided (mL): 3150 mL  Total OUT: 3150 mL    Total NET: -2736 mL      12-31-24 @ 07:01  -  12-31-24 @ 08:35  --------------------------------------------------------  IN:    Heparin Infusion: 18 mL  Total IN: 18 mL    OUT:    Voided (mL): 700 mL  Total OUT: 700 mL    Total NET: -682 mL          LABS:                            12.0   11.25 )-----------( 461      ( 31 Dec 2024 05:20 )             36.4                                               12-31    133[L]  |  96[L]  |  15  ----------------------------<  156[H]  4.2   |  23  |  0.7    Ca    8.2[L]      31 Dec 2024 05:20  Phos  3.9     12-31  Mg     2.0     12-31    TPro  5.8[L]  /  Alb  2.6[L]  /  TBili  0.2  /  DBili  x   /  AST  27  /  ALT  26  /  AlkPhos  153[H]  12-31      PTT - ( 31 Dec 2024 05:20 )  PTT:100.3 sec                                       Urinalysis Basic - ( 31 Dec 2024 05:20 )    Color: x / Appearance: x / SG: x / pH: x  Gluc: 156 mg/dL / Ketone: x  / Bili: x / Urobili: x   Blood: x / Protein: x / Nitrite: x   Leuk Esterase: x / RBC: x / WBC x   Sq Epi: x / Non Sq Epi: x / Bacteria: x                                                  LIVER FUNCTIONS - ( 31 Dec 2024 05:20 )  Alb: 2.6 g/dL / Pro: 5.8 g/dL / ALK PHOS: 153 U/L / ALT: 26 U/L / AST: 27 U/L / GGT: x                                                                                                                                   ABG - ( 30 Dec 2024 14:42 )  pH, Arterial: 7.50  pH, Blood: x     /  pCO2: 34    /  pO2: 79    / HCO3: 26    / Base Excess: 3.6   /  SaO2: 96.6                MEDICATIONS  (STANDING):  aMIOdarone    Tablet 400 milliGRAM(s) Oral two times a day  aspirin  chewable 81 milliGRAM(s) Oral daily  buMETAnide Injectable 2 milliGRAM(s) IV Push every 8 hours  chlorhexidine 2% Cloths 1 Application(s) Topical daily  glucagon  Injectable 1 milliGRAM(s) IntraMuscular once  guaifenesin/dextromethorphan Oral Liquid 10 milliLiter(s) Oral every 4 hours  heparin  Infusion.  Unit(s)/Hr (18 mL/Hr) IV Continuous <Continuous>  insulin glargine Injectable (LANTUS) 27 Unit(s) SubCutaneous at bedtime  insulin lispro (ADMELOG) corrective regimen sliding scale   SubCutaneous three times a day before meals  insulin lispro Injectable (ADMELOG) 9 Unit(s) SubCutaneous three times a day before meals  losartan 25 milliGRAM(s) Oral daily  melatonin 5 milliGRAM(s) Oral at bedtime  metoprolol tartrate 12.5 milliGRAM(s) Oral every 6 hours  nicotine -  14 mG/24Hr(s) Patch 1 Patch Transdermal every 24 hours  pantoprazole  Injectable 40 milliGRAM(s) IV Push with breakfast  ticagrelor 90 milliGRAM(s) Oral every 12 hours    MEDICATIONS  (PRN):  acetaminophen     Tablet .. 650 milliGRAM(s) Oral every 6 hours PRN Mild Pain (1 - 3)  benzocaine/menthol Lozenge 1 Lozenge Oral every 3 hours PRN Sore Throat  heparin   Injectable 8000 Unit(s) IV Push every 6 hours PRN For aPTT less than 40  heparin   Injectable 4000 Unit(s) IV Push every 6 hours PRN For aPTT between 40 - 57      Xrays:                                                                                     ECHO

## 2025-01-01 LAB
ALBUMIN SERPL ELPH-MCNC: 2.6 G/DL — LOW (ref 3.5–5.2)
ALP SERPL-CCNC: 146 U/L — HIGH (ref 30–115)
ALT FLD-CCNC: 25 U/L — SIGNIFICANT CHANGE UP (ref 0–41)
ANION GAP SERPL CALC-SCNC: 12 MMOL/L — SIGNIFICANT CHANGE UP (ref 7–14)
APTT BLD: 61.3 SEC — HIGH (ref 27–39.2)
AST SERPL-CCNC: 23 U/L — SIGNIFICANT CHANGE UP (ref 0–41)
BASOPHILS # BLD AUTO: 0.04 K/UL — SIGNIFICANT CHANGE UP (ref 0–0.2)
BASOPHILS NFR BLD AUTO: 0.4 % — SIGNIFICANT CHANGE UP (ref 0–1)
BILIRUB SERPL-MCNC: 0.2 MG/DL — SIGNIFICANT CHANGE UP (ref 0.2–1.2)
BUN SERPL-MCNC: 17 MG/DL — SIGNIFICANT CHANGE UP (ref 10–20)
CALCIUM SERPL-MCNC: 7.8 MG/DL — LOW (ref 8.4–10.4)
CHLORIDE SERPL-SCNC: 96 MMOL/L — LOW (ref 98–110)
CO2 SERPL-SCNC: 26 MMOL/L — SIGNIFICANT CHANGE UP (ref 17–32)
CREAT SERPL-MCNC: 0.7 MG/DL — SIGNIFICANT CHANGE UP (ref 0.7–1.5)
CRP SERPL-MCNC: 93.5 MG/L — HIGH
CULTURE RESULTS: SIGNIFICANT CHANGE UP
CULTURE RESULTS: SIGNIFICANT CHANGE UP
D DIMER BLD IA.RAPID-MCNC: 658 NG/ML DDU — HIGH
EGFR: 117 ML/MIN/1.73M2 — SIGNIFICANT CHANGE UP
EOSINOPHIL # BLD AUTO: 0.53 K/UL — SIGNIFICANT CHANGE UP (ref 0–0.7)
EOSINOPHIL NFR BLD AUTO: 5.4 % — SIGNIFICANT CHANGE UP (ref 0–8)
FERRITIN SERPL-MCNC: 598 NG/ML — HIGH (ref 30–400)
GAS PNL BLDA: SIGNIFICANT CHANGE UP
GLUCOSE BLDC GLUCOMTR-MCNC: 149 MG/DL — HIGH (ref 70–99)
GLUCOSE BLDC GLUCOMTR-MCNC: 153 MG/DL — HIGH (ref 70–99)
GLUCOSE BLDC GLUCOMTR-MCNC: 153 MG/DL — HIGH (ref 70–99)
GLUCOSE BLDC GLUCOMTR-MCNC: 170 MG/DL — HIGH (ref 70–99)
GLUCOSE SERPL-MCNC: 195 MG/DL — HIGH (ref 70–99)
HCT VFR BLD CALC: 37.2 % — LOW (ref 42–52)
HGB BLD-MCNC: 12.3 G/DL — LOW (ref 14–18)
IMM GRANULOCYTES NFR BLD AUTO: 0.7 % — HIGH (ref 0.1–0.3)
LYMPHOCYTES # BLD AUTO: 1.56 K/UL — SIGNIFICANT CHANGE UP (ref 1.2–3.4)
LYMPHOCYTES # BLD AUTO: 16 % — LOW (ref 20.5–51.1)
MAGNESIUM SERPL-MCNC: 1.8 MG/DL — SIGNIFICANT CHANGE UP (ref 1.8–2.4)
MCHC RBC-ENTMCNC: 30.7 PG — SIGNIFICANT CHANGE UP (ref 27–31)
MCHC RBC-ENTMCNC: 33.1 G/DL — SIGNIFICANT CHANGE UP (ref 32–37)
MCV RBC AUTO: 92.8 FL — SIGNIFICANT CHANGE UP (ref 80–94)
MONOCYTES # BLD AUTO: 1.03 K/UL — HIGH (ref 0.1–0.6)
MONOCYTES NFR BLD AUTO: 10.5 % — HIGH (ref 1.7–9.3)
NEUTROPHILS # BLD AUTO: 6.54 K/UL — HIGH (ref 1.4–6.5)
NEUTROPHILS NFR BLD AUTO: 67 % — SIGNIFICANT CHANGE UP (ref 42.2–75.2)
NRBC # BLD: 0 /100 WBCS — SIGNIFICANT CHANGE UP (ref 0–0)
PHOSPHATE SERPL-MCNC: 4.1 MG/DL — SIGNIFICANT CHANGE UP (ref 2.1–4.9)
PLATELET # BLD AUTO: 453 K/UL — HIGH (ref 130–400)
PMV BLD: 9.1 FL — SIGNIFICANT CHANGE UP (ref 7.4–10.4)
POTASSIUM SERPL-MCNC: 4.2 MMOL/L — SIGNIFICANT CHANGE UP (ref 3.5–5)
POTASSIUM SERPL-SCNC: 4.2 MMOL/L — SIGNIFICANT CHANGE UP (ref 3.5–5)
PROT SERPL-MCNC: 5.8 G/DL — LOW (ref 6–8)
RBC # BLD: 4.01 M/UL — LOW (ref 4.7–6.1)
RBC # FLD: 12.2 % — SIGNIFICANT CHANGE UP (ref 11.5–14.5)
SODIUM SERPL-SCNC: 134 MMOL/L — LOW (ref 135–146)
SPECIMEN SOURCE: SIGNIFICANT CHANGE UP
SPECIMEN SOURCE: SIGNIFICANT CHANGE UP
WBC # BLD: 9.77 K/UL — SIGNIFICANT CHANGE UP (ref 4.8–10.8)
WBC # FLD AUTO: 9.77 K/UL — SIGNIFICANT CHANGE UP (ref 4.8–10.8)

## 2025-01-01 PROCEDURE — 71045 X-RAY EXAM CHEST 1 VIEW: CPT | Mod: 26

## 2025-01-01 PROCEDURE — 99291 CRITICAL CARE FIRST HOUR: CPT

## 2025-01-01 PROCEDURE — 99233 SBSQ HOSP IP/OBS HIGH 50: CPT

## 2025-01-01 RX ORDER — MAGNESIUM SULFATE 500 MG/ML
2 INJECTION, SOLUTION INTRAMUSCULAR; INTRAVENOUS ONCE
Refills: 0 | Status: COMPLETED | OUTPATIENT
Start: 2025-01-01 | End: 2025-01-01

## 2025-01-01 RX ADMIN — BUMETANIDE 2 MILLIGRAM(S): 2 TABLET ORAL at 13:13

## 2025-01-01 RX ADMIN — Medication 0: at 17:26

## 2025-01-01 RX ADMIN — LOSARTAN POTASSIUM 25 MILLIGRAM(S): 100 TABLET, FILM COATED ORAL at 05:22

## 2025-01-01 RX ADMIN — AMIODARONE HYDROCHLORIDE 400 MILLIGRAM(S): 200 TABLET ORAL at 05:22

## 2025-01-01 RX ADMIN — NICOTINE POLACRILEX 1 PATCH: 4 LOZENGE ORAL at 07:00

## 2025-01-01 RX ADMIN — Medication 1: at 08:40

## 2025-01-01 RX ADMIN — Medication 12.5 MILLIGRAM(S): at 05:22

## 2025-01-01 RX ADMIN — BENZOCAINE AND MENTHOL 1 LOZENGE: 15; 3.6 LOZENGE ORAL at 06:15

## 2025-01-01 RX ADMIN — HEPARIN SODIUM 1600 UNIT(S)/HR: 1000 INJECTION, SOLUTION INTRAVENOUS; SUBCUTANEOUS at 05:49

## 2025-01-01 RX ADMIN — Medication 9 UNIT(S): at 13:24

## 2025-01-01 RX ADMIN — NICOTINE POLACRILEX 1 PATCH: 4 LOZENGE ORAL at 01:05

## 2025-01-01 RX ADMIN — BENZOCAINE AND MENTHOL 1 LOZENGE: 15; 3.6 LOZENGE ORAL at 22:34

## 2025-01-01 RX ADMIN — MAGNESIUM SULFATE 25 GRAM(S): 500 INJECTION, SOLUTION INTRAMUSCULAR; INTRAVENOUS at 08:25

## 2025-01-01 RX ADMIN — ACETAMINOPHEN 650 MILLIGRAM(S): 80 SOLUTION/ DROPS ORAL at 22:34

## 2025-01-01 RX ADMIN — NICOTINE POLACRILEX 1 PATCH: 4 LOZENGE ORAL at 19:45

## 2025-01-01 RX ADMIN — BUMETANIDE 2 MILLIGRAM(S): 2 TABLET ORAL at 05:22

## 2025-01-01 RX ADMIN — Medication 5 MILLIGRAM(S): at 22:19

## 2025-01-01 RX ADMIN — TICAGRELOR 90 MILLIGRAM(S): 60 TABLET ORAL at 05:22

## 2025-01-01 RX ADMIN — Medication 12.5 MILLIGRAM(S): at 01:00

## 2025-01-01 RX ADMIN — AMIODARONE HYDROCHLORIDE 400 MILLIGRAM(S): 200 TABLET ORAL at 17:27

## 2025-01-01 RX ADMIN — Medication 9 UNIT(S): at 17:26

## 2025-01-01 RX ADMIN — PANTOPRAZOLE 40 MILLIGRAM(S): 40 TABLET, DELAYED RELEASE ORAL at 08:24

## 2025-01-01 RX ADMIN — Medication 1: at 13:23

## 2025-01-01 RX ADMIN — INSULIN GLARGINE-YFGN 27 UNIT(S): 100 INJECTION, SOLUTION SUBCUTANEOUS at 22:19

## 2025-01-01 RX ADMIN — Medication 12.5 MILLIGRAM(S): at 13:13

## 2025-01-01 RX ADMIN — CHLORHEXIDINE GLUCONATE 1 APPLICATION(S): 1.2 RINSE ORAL at 13:24

## 2025-01-01 RX ADMIN — ACETAMINOPHEN 650 MILLIGRAM(S): 80 SOLUTION/ DROPS ORAL at 23:30

## 2025-01-01 RX ADMIN — Medication 81 MILLIGRAM(S): at 13:13

## 2025-01-01 RX ADMIN — Medication 10 MILLILITER(S): at 17:27

## 2025-01-01 RX ADMIN — Medication 9 UNIT(S): at 08:40

## 2025-01-01 RX ADMIN — BUMETANIDE 2 MILLIGRAM(S): 2 TABLET ORAL at 22:19

## 2025-01-01 RX ADMIN — NICOTINE POLACRILEX 1 PATCH: 4 LOZENGE ORAL at 01:01

## 2025-01-01 RX ADMIN — TICAGRELOR 90 MILLIGRAM(S): 60 TABLET ORAL at 17:27

## 2025-01-01 RX ADMIN — Medication 12.5 MILLIGRAM(S): at 17:27

## 2025-01-01 RX ADMIN — Medication 10 MILLILITER(S): at 13:13

## 2025-01-01 NOTE — PROGRESS NOTE ADULT - SUBJECTIVE AND OBJECTIVE BOX
SUBJECTIVE/OVERNIGHT EVENTS  Today is hospital day 6d. This morning patient was seen and examined at bedside, resting comfortably in bed. No acute or major events overnight.        MEDICATIONS  STANDING MEDICATIONS  aMIOdarone    Tablet 400 milliGRAM(s) Oral two times a day  aspirin  chewable 81 milliGRAM(s) Oral daily  buMETAnide Injectable 2 milliGRAM(s) IV Push every 8 hours  chlorhexidine 2% Cloths 1 Application(s) Topical daily  glucagon  Injectable 1 milliGRAM(s) IntraMuscular once  guaifenesin/dextromethorphan Oral Liquid 10 milliLiter(s) Oral every 4 hours  heparin  Infusion.  Unit(s)/Hr IV Continuous <Continuous>  insulin glargine Injectable (LANTUS) 27 Unit(s) SubCutaneous at bedtime  insulin lispro (ADMELOG) corrective regimen sliding scale   SubCutaneous three times a day before meals  insulin lispro Injectable (ADMELOG) 9 Unit(s) SubCutaneous three times a day before meals  losartan 25 milliGRAM(s) Oral daily  melatonin 5 milliGRAM(s) Oral at bedtime  metoprolol tartrate 12.5 milliGRAM(s) Oral every 6 hours  nicotine -  14 mG/24Hr(s) Patch 1 Patch Transdermal every 24 hours  pantoprazole  Injectable 40 milliGRAM(s) IV Push with breakfast  ticagrelor 90 milliGRAM(s) Oral every 12 hours    PRN MEDICATIONS  acetaminophen     Tablet .. 650 milliGRAM(s) Oral every 6 hours PRN  benzocaine/menthol Lozenge 1 Lozenge Oral every 3 hours PRN  heparin   Injectable 4000 Unit(s) IV Push every 6 hours PRN  heparin   Injectable 8000 Unit(s) IV Push every 6 hours PRN    VITALS  T(F): 98.2 (01-01-25 @ 08:00), Max: 98.5 (01-01-25 @ 00:00)  HR: 68 (01-01-25 @ 10:00) (63 - 82)  BP: 99/54 (01-01-25 @ 10:00) (82/46 - 110/72)  RR: 30 (01-01-25 @ 10:00) (21 - 43)  SpO2: 91% (01-01-25 @ 10:00) (91% - 99%)  POCT Blood Glucose.: 170 mg/dL (01-01-25 @ 08:29)  POCT Blood Glucose.: 183 mg/dL (12-31-24 @ 21:36)  POCT Blood Glucose.: 119 mg/dL (12-31-24 @ 15:24)      PHYSICAL EXAM:  GENERAL: NAD, well-groomed, well-developed  HEAD:  Atraumatic, Normocephalic  EYES: EOMI, PERRLA, conjunctiva and sclera clear  ENMT:  Moist mucous membranes  NECK: Supple, No JVD,  CHEST/LUNG: Clear to auscultation bilaterally  HEART: Regular rate and rhythm  ABDOMEN: Soft, Nontender, Nondistended; Bowel sounds present  NEURO:  MENTAL STATUS: AAOx3  LANG/SPEECH: Fluent, intact naming, repetition & comprehension  CRANIAL NERVES:  II: Pupils equal and reactive, no RAPD, normal visual field and fundus  III, IV, VI: EOM intact, no gaze preference or deviation  V: normal  VII: no facial asymmetry  VIII: normal hearing to speech  MOTOR: 5/5 in both upper and lower extremities  REFLEXES: 2/4 throughout  SENSORY: Normal to touch  COORD: Normal finger to nose   Gait:   EXTREMITIES: No LE edema, no calf tenderness  LYMPH: No lymphadenopathy noted  SKIN: No rashes or lesions         LABS             12.3   9.77  )-----------( 453      ( 01-01-25 @ 04:24 )             37.2     134  |  96  |  17  -------------------------<  195   01-01-25 @ 04:24  4.2  |  26  |  0.7    Ca      7.8     01-01-25 @ 04:24  Phos   4.1     01-01-25 @ 04:24  Mg     1.8     01-01-25 @ 04:24    TPro  5.8  /  Alb  2.6  /  TBili  0.2  /  DBili  x   /  AST  23  /  ALT  25  /  AlkPhos  146  /  GGT  x     01-01-25 @ 04:24    PTT - ( 01-01-25 @ 04:24 )  PTT:61.3 sec      Urinalysis Basic - ( 01 Jan 2025 04:24 )    Color: x / Appearance: x / SG: x / pH: x  Gluc: 195 mg/dL / Ketone: x  / Bili: x / Urobili: x   Blood: x / Protein: x / Nitrite: x   Leuk Esterase: x / RBC: x / WBC x   Sq Epi: x / Non Sq Epi: x / Bacteria: x      ABG - ( 01 Jan 2025 03:53 )  pH, Arterial: 7.50  pH, Blood: x     /  pCO2: 38    /  pO2: 88    / HCO3: 30    / Base Excess: 6.1   /  SaO2: 97.8                IMAGING      ASSESSMENT AND PLAN:

## 2025-01-01 NOTE — PROGRESS NOTE ADULT - ASSESSMENT
Impression:     NSTEMI   HFREF with severely reduced EF   VTACH s/p cardioversion   LV thrombus   COVID pneumonia   Acute hypoxemic respiratory failure     IMPRESSION:      PLAN:    CNS: MS adequate.     HEENT: Oral care    PULMONARY:  CXR with persistent bilateral infiltrates. On NC however. Clinically better.     CARDIOVASCULAR: Daily IVC assessment. Diuretics per cardiology; cath when stable. Fluid balance negative last 24 hours.     GI: GI prophylaxis.  Feeding as tolerated.     RENAL:  Follow up lytes.  Correct as needed. Kidney at baseline. No davis.     INFECTIOUS DISEASE: Afebrile. blood culture negative. Check sputum culture. COVID positive. ID evaluation noted. Procal 0.15. No steroids because of the late MI presentation. Inflammatory markers elevated.    HEMATOLOGICAL:  On therapeutic AC. No drop in hg. PTT therapeutic.     ENDOCRINE:  Follow up FS.  Insulin protocol if needed.     MUSCULOSKELETAL: Out of bed.     CCU care. Will follow.

## 2025-01-01 NOTE — PROGRESS NOTE ADULT - SUBJECTIVE AND OBJECTIVE BOX
Patient is a 44y old  Male who presents with a chief complaint of Acute ischemic heart disease     (28 Dec 2024 22:59)        Over Night Events:    On NC   Appears comfortable  Spo2 94%         ROS:  See HPI    PHYSICAL EXAM    ICU Vital Signs Last 24 Hrs  T(C): 36.8 (01 Jan 2025 08:00), Max: 36.9 (01 Jan 2025 00:00)  T(F): 98.2 (01 Jan 2025 08:00), Max: 98.5 (01 Jan 2025 00:00)  HR: 63 (01 Jan 2025 08:00) (63 - 82)  BP: 90/54 (01 Jan 2025 08:00) (82/46 - 110/72)  BP(mean): 65 (01 Jan 2025 08:00) (58 - 87)  ABP: --  ABP(mean): --  RR: 26 (01 Jan 2025 08:00) (21 - 43)  SpO2: 94% (01 Jan 2025 08:00) (94% - 99%)    O2 Parameters below as of 01 Jan 2025 08:13  Patient On (Oxygen Delivery Method): nasal cannula  O2 Flow (L/min): 4          General: NAD   HEENT: BART             Lymphatic system: No cervical LN   Lungs: Bilateral BS, crackles  Cardiovascular: Regular   Gastrointestinal: Soft, Positive BS  Extremities: No clubbing.  Moves extremities.  Full Range of motion   Skin: Warm, intact  Neurological: No motor or sensory deficit       12-31-24 @ 07:01  -  01-01-25 @ 07:00  --------------------------------------------------------  IN:    Heparin Infusion: 342 mL    Oral Fluid: 960 mL  Total IN: 1302 mL    OUT:    Voided (mL): 4275 mL  Total OUT: 4275 mL    Total NET: -2973 mL      01-01-25 @ 07:01  -  01-01-25 @ 09:06  --------------------------------------------------------  IN:    Heparin Infusion: 32 mL  Total IN: 32 mL    OUT:    Voided (mL): 800 mL  Total OUT: 800 mL    Total NET: -768 mL          LABS:                            12.3   9.77  )-----------( 453      ( 01 Jan 2025 04:24 )             37.2                                               01-01    134[L]  |  96[L]  |  17  ----------------------------<  195[H]  4.2   |  26  |  0.7    Ca    7.8[L]      01 Jan 2025 04:24  Phos  4.1     01-01  Mg     1.8     01-01    TPro  5.8[L]  /  Alb  2.6[L]  /  TBili  0.2  /  DBili  x   /  AST  23  /  ALT  25  /  AlkPhos  146[H]  01-01      PTT - ( 01 Jan 2025 04:24 )  PTT:61.3 sec                                       Urinalysis Basic - ( 01 Jan 2025 04:24 )    Color: x / Appearance: x / SG: x / pH: x  Gluc: 195 mg/dL / Ketone: x  / Bili: x / Urobili: x   Blood: x / Protein: x / Nitrite: x   Leuk Esterase: x / RBC: x / WBC x   Sq Epi: x / Non Sq Epi: x / Bacteria: x                                                  LIVER FUNCTIONS - ( 01 Jan 2025 04:24 )  Alb: 2.6 g/dL / Pro: 5.8 g/dL / ALK PHOS: 146 U/L / ALT: 25 U/L / AST: 23 U/L / GGT: x                                                                                                                                   ABG - ( 01 Jan 2025 03:53 )  pH, Arterial: 7.50  pH, Blood: x     /  pCO2: 38    /  pO2: 88    / HCO3: 30    / Base Excess: 6.1   /  SaO2: 97.8                MEDICATIONS  (STANDING):  aMIOdarone    Tablet 400 milliGRAM(s) Oral two times a day  aspirin  chewable 81 milliGRAM(s) Oral daily  buMETAnide Injectable 2 milliGRAM(s) IV Push every 8 hours  chlorhexidine 2% Cloths 1 Application(s) Topical daily  glucagon  Injectable 1 milliGRAM(s) IntraMuscular once  guaifenesin/dextromethorphan Oral Liquid 10 milliLiter(s) Oral every 4 hours  heparin  Infusion.  Unit(s)/Hr (18 mL/Hr) IV Continuous <Continuous>  insulin glargine Injectable (LANTUS) 27 Unit(s) SubCutaneous at bedtime  insulin lispro (ADMELOG) corrective regimen sliding scale   SubCutaneous three times a day before meals  insulin lispro Injectable (ADMELOG) 9 Unit(s) SubCutaneous three times a day before meals  losartan 25 milliGRAM(s) Oral daily  melatonin 5 milliGRAM(s) Oral at bedtime  metoprolol tartrate 12.5 milliGRAM(s) Oral every 6 hours  nicotine -  14 mG/24Hr(s) Patch 1 Patch Transdermal every 24 hours  pantoprazole  Injectable 40 milliGRAM(s) IV Push with breakfast  ticagrelor 90 milliGRAM(s) Oral every 12 hours    MEDICATIONS  (PRN):  acetaminophen     Tablet .. 650 milliGRAM(s) Oral every 6 hours PRN Mild Pain (1 - 3)  benzocaine/menthol Lozenge 1 Lozenge Oral every 3 hours PRN Sore Throat  heparin   Injectable 4000 Unit(s) IV Push every 6 hours PRN For aPTT between 40 - 57  heparin   Injectable 8000 Unit(s) IV Push every 6 hours PRN For aPTT less than 40      Xrays:                                                                                     ECHO

## 2025-01-01 NOTE — PROGRESS NOTE ADULT - ASSESSMENT
45 YO M with PMHx of HTN, IDDM, HLD, active smoker (1/2 PPD x 28 yrs), hepatitis B (s/p treatment) presented with CC chest pressure and SOB.    TTE 12/27/24: EF 25-30%. Multiple RWMA. GIIDD. Small, fixed, anteroapical left ventricular thrombus. Right ventricular apical hypokinesis. Mild MR.     IMPRESSION  #Late presentation anterior MI   #Acute HFrEF, ICM, EF 25-30%  #Sustained monomorphic slow Vtach; 12 min overnight on 12/28 with rate ~ 140s  #Lv thrombus  #Acute hypoxic resp failure /mild ARDS COVID +ve  #HTN  #HLD  #IDDM w/ hyperglycemia  #Active smoker  #Alcohol and drug (Timothy dust) abuse         PLAN    CNS:   [place] about moving [place],   No pain    PULMONARY:  CT chest with GGO and small right pleural effusion.   -xray 12-30: bilateral effusion with bilateral opacities  - monitor off steroids as oxygen requirements are not worsening and had recent MI  - wean off o2 as tolerated. HFNC at [place]  - sputum culture  - ABG. if paO2 worsening will consider the dexamethasone.   - Trend CRP and ferritin  -CXR AM    CARDIOVASCULAR:   - Continue heparin gtt. Requires long term anticoagulation for LV thrombus   - continue ASA 81 QD and ticagrelor 90mg BID.  -start losartan 25 mg daily, discontinue Isordil  - Metoprolol 12.5mg PO Q6  -s/p lidocaine   -c/w amiodarone 400 mg BID PO. today day 3  - c/w bumex 2 TID. target negative 2 L  -LHC once optimized.  - Needs AICD/ Life vest before DC    GI:   - Pantoprazole 40mg IV QD.    RENAL:  - Follow up lytes. Correct as needed.  - continue bumex 2 TID    INFECTIOUS DISEASE:  -COVID + with ARDS rpt d dimer, CRP ESR LDH  -No dexamethasone for now as patient is post MI  - Blood cultures and MRSA negative  - Monitor off abx.  - Trend CRP and ferritin    HEMATOLOGICAL:    -LV Thrombus  - Heparin drip full AC   - monitor PTT. platelets    ENDOCRINE:    -TSH 0.99  -A1c 10.8  -  - on lantus 27 and lispro of 9    MUSCULOSKELETAL:   - AAT  -OOTC    Disposition: CCU

## 2025-01-02 LAB
ALBUMIN SERPL ELPH-MCNC: 2.7 G/DL — LOW (ref 3.5–5.2)
ALP SERPL-CCNC: 155 U/L — HIGH (ref 30–115)
ALT FLD-CCNC: 27 U/L — SIGNIFICANT CHANGE UP (ref 0–41)
ANION GAP SERPL CALC-SCNC: 11 MMOL/L — SIGNIFICANT CHANGE UP (ref 7–14)
APTT BLD: 90.5 SEC — CRITICAL HIGH (ref 27–39.2)
AST SERPL-CCNC: 26 U/L — SIGNIFICANT CHANGE UP (ref 0–41)
BASOPHILS # BLD AUTO: 0.06 K/UL — SIGNIFICANT CHANGE UP (ref 0–0.2)
BASOPHILS NFR BLD AUTO: 0.6 % — SIGNIFICANT CHANGE UP (ref 0–1)
BILIRUB SERPL-MCNC: <0.2 MG/DL — SIGNIFICANT CHANGE UP (ref 0.2–1.2)
BUN SERPL-MCNC: 15 MG/DL — SIGNIFICANT CHANGE UP (ref 10–20)
CALCIUM SERPL-MCNC: 7.6 MG/DL — LOW (ref 8.4–10.4)
CHLORIDE SERPL-SCNC: 96 MMOL/L — LOW (ref 98–110)
CO2 SERPL-SCNC: 26 MMOL/L — SIGNIFICANT CHANGE UP (ref 17–32)
CREAT SERPL-MCNC: 0.8 MG/DL — SIGNIFICANT CHANGE UP (ref 0.7–1.5)
CRP SERPL-MCNC: 69.2 MG/L — HIGH
EGFR: 112 ML/MIN/1.73M2 — SIGNIFICANT CHANGE UP
EOSINOPHIL # BLD AUTO: 0.69 K/UL — SIGNIFICANT CHANGE UP (ref 0–0.7)
EOSINOPHIL NFR BLD AUTO: 6.3 % — SIGNIFICANT CHANGE UP (ref 0–8)
GAS PNL BLDA: SIGNIFICANT CHANGE UP
GLUCOSE BLDC GLUCOMTR-MCNC: 153 MG/DL — HIGH (ref 70–99)
GLUCOSE BLDC GLUCOMTR-MCNC: 160 MG/DL — HIGH (ref 70–99)
GLUCOSE BLDC GLUCOMTR-MCNC: 164 MG/DL — HIGH (ref 70–99)
GLUCOSE BLDC GLUCOMTR-MCNC: 187 MG/DL — HIGH (ref 70–99)
GLUCOSE SERPL-MCNC: 159 MG/DL — HIGH (ref 70–99)
HCT VFR BLD CALC: 37.2 % — LOW (ref 42–52)
HGB BLD-MCNC: 12.2 G/DL — LOW (ref 14–18)
IMM GRANULOCYTES NFR BLD AUTO: 0.8 % — HIGH (ref 0.1–0.3)
LDH SERPL L TO P-CCNC: 307 U/L — HIGH (ref 50–242)
LYMPHOCYTES # BLD AUTO: 2.32 K/UL — SIGNIFICANT CHANGE UP (ref 1.2–3.4)
LYMPHOCYTES # BLD AUTO: 21.3 % — SIGNIFICANT CHANGE UP (ref 20.5–51.1)
MAGNESIUM SERPL-MCNC: 1.9 MG/DL — SIGNIFICANT CHANGE UP (ref 1.8–2.4)
MCHC RBC-ENTMCNC: 30.6 PG — SIGNIFICANT CHANGE UP (ref 27–31)
MCHC RBC-ENTMCNC: 32.8 G/DL — SIGNIFICANT CHANGE UP (ref 32–37)
MCV RBC AUTO: 93.2 FL — SIGNIFICANT CHANGE UP (ref 80–94)
MONOCYTES # BLD AUTO: 0.98 K/UL — HIGH (ref 0.1–0.6)
MONOCYTES NFR BLD AUTO: 9 % — SIGNIFICANT CHANGE UP (ref 1.7–9.3)
NEUTROPHILS # BLD AUTO: 6.74 K/UL — HIGH (ref 1.4–6.5)
NEUTROPHILS NFR BLD AUTO: 62 % — SIGNIFICANT CHANGE UP (ref 42.2–75.2)
NRBC # BLD: 0 /100 WBCS — SIGNIFICANT CHANGE UP (ref 0–0)
PHOSPHATE SERPL-MCNC: 4.1 MG/DL — SIGNIFICANT CHANGE UP (ref 2.1–4.9)
PLATELET # BLD AUTO: 427 K/UL — HIGH (ref 130–400)
PMV BLD: 9.1 FL — SIGNIFICANT CHANGE UP (ref 7.4–10.4)
POTASSIUM SERPL-MCNC: 3.9 MMOL/L — SIGNIFICANT CHANGE UP (ref 3.5–5)
POTASSIUM SERPL-SCNC: 3.9 MMOL/L — SIGNIFICANT CHANGE UP (ref 3.5–5)
PROT SERPL-MCNC: 5.7 G/DL — LOW (ref 6–8)
RBC # BLD: 3.99 M/UL — LOW (ref 4.7–6.1)
RBC # FLD: 12.3 % — SIGNIFICANT CHANGE UP (ref 11.5–14.5)
SODIUM SERPL-SCNC: 133 MMOL/L — LOW (ref 135–146)
WBC # BLD: 10.88 K/UL — HIGH (ref 4.8–10.8)
WBC # FLD AUTO: 10.88 K/UL — HIGH (ref 4.8–10.8)

## 2025-01-02 PROCEDURE — 71045 X-RAY EXAM CHEST 1 VIEW: CPT | Mod: 26

## 2025-01-02 PROCEDURE — 78452 HT MUSCLE IMAGE SPECT MULT: CPT | Mod: 26

## 2025-01-02 PROCEDURE — 99291 CRITICAL CARE FIRST HOUR: CPT

## 2025-01-02 PROCEDURE — 99232 SBSQ HOSP IP/OBS MODERATE 35: CPT

## 2025-01-02 PROCEDURE — 93010 ELECTROCARDIOGRAM REPORT: CPT

## 2025-01-02 RX ORDER — MAGNESIUM SULFATE 500 MG/ML
1 INJECTION, SOLUTION INTRAMUSCULAR; INTRAVENOUS ONCE
Refills: 0 | Status: COMPLETED | OUTPATIENT
Start: 2025-01-02 | End: 2025-01-02

## 2025-01-02 RX ORDER — CLOPIDOGREL BISULFATE 75 MG/1
75 TABLET, FILM COATED ORAL DAILY
Refills: 0 | Status: DISCONTINUED | OUTPATIENT
Start: 2025-01-02 | End: 2025-01-16

## 2025-01-02 RX ORDER — REGADENOSON 0.08 MG/ML
0.4 INJECTION, SOLUTION INTRAVENOUS ONCE
Refills: 0 | Status: DISCONTINUED | OUTPATIENT
Start: 2025-01-02 | End: 2025-01-16

## 2025-01-02 RX ORDER — SODIUM CHLORIDE 9 MG/ML
500 INJECTION, SOLUTION INTRAVENOUS
Refills: 0 | Status: DISCONTINUED | OUTPATIENT
Start: 2025-01-02 | End: 2025-01-02

## 2025-01-02 RX ADMIN — NICOTINE POLACRILEX 1 PATCH: 4 LOZENGE ORAL at 01:42

## 2025-01-02 RX ADMIN — PANTOPRAZOLE 40 MILLIGRAM(S): 40 TABLET, DELAYED RELEASE ORAL at 08:05

## 2025-01-02 RX ADMIN — Medication 12.5 MILLIGRAM(S): at 23:07

## 2025-01-02 RX ADMIN — Medication 1: at 11:43

## 2025-01-02 RX ADMIN — NICOTINE POLACRILEX 1 PATCH: 4 LOZENGE ORAL at 19:00

## 2025-01-02 RX ADMIN — ACETAMINOPHEN 650 MILLIGRAM(S): 80 SOLUTION/ DROPS ORAL at 05:00

## 2025-01-02 RX ADMIN — INSULIN GLARGINE-YFGN 27 UNIT(S): 100 INJECTION, SOLUTION SUBCUTANEOUS at 22:11

## 2025-01-02 RX ADMIN — Medication 12.5 MILLIGRAM(S): at 11:33

## 2025-01-02 RX ADMIN — Medication 81 MILLIGRAM(S): at 11:33

## 2025-01-02 RX ADMIN — NICOTINE POLACRILEX 1 PATCH: 4 LOZENGE ORAL at 00:58

## 2025-01-02 RX ADMIN — Medication 10 MILLILITER(S): at 11:33

## 2025-01-02 RX ADMIN — BUMETANIDE 2 MILLIGRAM(S): 2 TABLET ORAL at 05:44

## 2025-01-02 RX ADMIN — CLOPIDOGREL BISULFATE 75 MILLIGRAM(S): 75 TABLET, FILM COATED ORAL at 17:46

## 2025-01-02 RX ADMIN — Medication 1: at 15:54

## 2025-01-02 RX ADMIN — BENZOCAINE AND MENTHOL 1 LOZENGE: 15; 3.6 LOZENGE ORAL at 05:48

## 2025-01-02 RX ADMIN — TICAGRELOR 90 MILLIGRAM(S): 60 TABLET ORAL at 05:44

## 2025-01-02 RX ADMIN — HEPARIN SODIUM 1600 UNIT(S)/HR: 1000 INJECTION, SOLUTION INTRAVENOUS; SUBCUTANEOUS at 07:24

## 2025-01-02 RX ADMIN — Medication 5 MILLIGRAM(S): at 22:14

## 2025-01-02 RX ADMIN — BENZOCAINE AND MENTHOL 1 LOZENGE: 15; 3.6 LOZENGE ORAL at 11:31

## 2025-01-02 RX ADMIN — BENZOCAINE AND MENTHOL 1 LOZENGE: 15; 3.6 LOZENGE ORAL at 15:54

## 2025-01-02 RX ADMIN — NICOTINE POLACRILEX 1 PATCH: 4 LOZENGE ORAL at 07:59

## 2025-01-02 RX ADMIN — Medication 12.5 MILLIGRAM(S): at 17:45

## 2025-01-02 RX ADMIN — Medication 12.5 MILLIGRAM(S): at 00:59

## 2025-01-02 RX ADMIN — SODIUM CHLORIDE 500 MILLILITER(S): 9 INJECTION, SOLUTION INTRAVENOUS at 15:46

## 2025-01-02 RX ADMIN — Medication 9 UNIT(S): at 07:54

## 2025-01-02 RX ADMIN — Medication 9 UNIT(S): at 11:44

## 2025-01-02 RX ADMIN — MAGNESIUM SULFATE 100 GRAM(S): 500 INJECTION, SOLUTION INTRAMUSCULAR; INTRAVENOUS at 11:45

## 2025-01-02 RX ADMIN — ACETAMINOPHEN 650 MILLIGRAM(S): 80 SOLUTION/ DROPS ORAL at 04:26

## 2025-01-02 RX ADMIN — BENZOCAINE AND MENTHOL 1 LOZENGE: 15; 3.6 LOZENGE ORAL at 23:08

## 2025-01-02 RX ADMIN — AMIODARONE HYDROCHLORIDE 400 MILLIGRAM(S): 200 TABLET ORAL at 05:44

## 2025-01-02 RX ADMIN — CHLORHEXIDINE GLUCONATE 1 APPLICATION(S): 1.2 RINSE ORAL at 11:31

## 2025-01-02 RX ADMIN — Medication 1: at 07:53

## 2025-01-02 RX ADMIN — AMIODARONE HYDROCHLORIDE 400 MILLIGRAM(S): 200 TABLET ORAL at 17:45

## 2025-01-02 RX ADMIN — Medication 9 UNIT(S): at 15:55

## 2025-01-02 NOTE — PROGRESS NOTE ADULT - ASSESSMENT
43 YO M with PMHx of HTN, IDDM, HLD, active smoker (1/2 PPD x 28 yrs), hepatitis B (s/p treatment) presented with CC chest pressure and SOB.    TTE 12/27/24: EF 25-30%. Multiple RWMA. GIIDD. Small, fixed, anteroapical left ventricular thrombus. Right ventricular apical hypokinesis. Mild MR.     IMPRESSION  #Late presentation anterior MI   #Acute HFrEF, ICM, EF 25-30%  #Sustained monomorphic slow Vtach; 12 min overnight on 12/28 with rate ~ 140s  #Lv thrombus  #Acute hypoxic resp failure /mild ARDS COVID +ve  #HTN  #HLD  #IDDM w/ hyperglycemia  #Active smoker  #Alcohol and drug (Timothy dust) abuse         PLAN    CNS:   AOx3  No pain    PULMONARY:  CT chest with GGO and small right pleural effusion.   -xray 12-30: bilateral effusion with bilateral opacities  - monitor off steroids as oxygen requirements are not worsening and had recent MI  - wean off o2 as tolerated.   - CRP trending down.  -CXR improving    CARDIOVASCULAR:   - Continue heparin gtt. Requires long term anticoagulation for LV thrombus   - continue ASA 81 QD   - switch ticagrelor to Plavix as patient on triple therapy   -start losartan 25 mg daily, discontinue Isordil  - Metoprolol 12.5mg PO Q6  -s/p lidocaine   -c/w amiodarone 400 mg BID PO. today day 5  - POCUS IVC < 2cm and collapsible. BP on the soft side. stop bumex.  bolus   - Viability test  - [facility] once optimized.  - Needs AICD/ Life vest before DC    GI:   - Pantoprazole 40mg IV QD.    RENAL:  - Follow up lytes. Correct as needed.    INFECTIOUS DISEASE:  -COVID + with ARDS rpt d dimer, CRP ESR LDH  -No dexamethasone for now as patient is post MI  - Blood cultures and MRSA negative  - Monitor off abx.  -CRP trending down    HEMATOLOGICAL:    -LV Thrombus  - Heparin drip full AC   - monitor PTT. platelets    ENDOCRINE:    -TSH 0.99  -A1c 10.8  -  - on lantus 27 and lispro of 9    MUSCULOSKELETAL:   - AAT  -OOTC    Disposition: CCU

## 2025-01-02 NOTE — PROGRESS NOTE ADULT - ASSESSMENT
45 YO M with PMHx of HTN, IDDM, HLD, active smoker (1/2 PPD x 28 yrs), hepatitis B (s/p treatment) presented with CC chest pressure and SOB.    TTE 12/27/24: EF 25-30%. Multiple RWMA. GIIDD. Small, fixed, anteroapical left ventricular thrombus. Right ventricular apical hypokinesis. Mild MR.     IMPRESSION  #Late presentation anterior MI   #Acute HFrEF, ICM, EF 25-30%  #Sustained monomorphic slow Vtach; 12 min overnight on 12/28 with rate ~ 140s  #Lv thrombus  #Acute hypoxic resp failure /mild ARDS COVID +ve  #HTN  #HLD  #IDDM w/ hyperglycemia  #Active smoker  #Alcohol and drug (Timothy dust) abuse         PLAN    CNS:   [place],   No pain    PULMONARY:  CT chest with GGO and small right pleural effusion.   -xray 12-30: bilateral effusion with bilateral opacities  - monitor off steroids as oxygen requirements are not worsening and had recent MI  - wean off o2 as tolerated. HFNC at [place]  - sputum culture  - ABG. if paO2 worsening will consider the dexamethasone.   - Trend CRP and ferritin  -CXR AM    CARDIOVASCULAR:   - Continue heparin gtt. Requires long term anticoagulation for LV thrombus   - continue ASA 81 QD and ticagrelor 90mg BID.  -start losartan 25 mg daily, discontinue Isordil  - Metoprolol 12.5mg PO Q6  -s/p lidocaine   -c/w amiodarone 400 mg BID PO. today day 3 - complete load and switch to 200 mg QD  - d/c bumex   - nuclear viability study to assess the anterior wall. If non-viable, would hold off on cath given risk/benefit ratio.  - Needs AICD/ Life vest before DC - EP eval    GI:   - Pantoprazole 40mg IV QD.    RENAL:  - Follow up lytes. Correct as needed.  - continue bumex 2 TID    INFECTIOUS DISEASE:  -COVID + with ARDS rpt d dimer, CRP ESR LDH  -No dexamethasone for now as patient is post MI  - Blood cultures and MRSA negative  - Monitor off abx.  - Trend CRP and ferritin    HEMATOLOGICAL:    -LV Thrombus  - Heparin drip full AC   - monitor PTT. platelets  - Eliquis on d/c    ENDOCRINE:    -TSH 0.99  -A1c 10.8  -  - on lantus 27 and lispro of 9 - f/u endocrine recs. Add Farxiga or Jardiance on d/c    MUSCULOSKELETAL:   - AAT  -OOTC    Disposition: CCU

## 2025-01-02 NOTE — PROGRESS NOTE ADULT - SUBJECTIVE AND OBJECTIVE BOX
SARAH FARMER  44y, Male  Allergy: Seafood (Urticaria)  No Known Drug Allergies  shellfish (Urticaria; Rash; Short breath)      LOS  7d    CHIEF COMPLAINT: Late presentation MI (01 Jan 2025 12:49)      INTERVAL EVENTS/HPI  - No acute events overnight  - T(F): , Max: 99.3 (01-01-25 @ 20:00)  - weaned to 4L NC -- dsypnea/cough continues to improve  - WBC Count: 10.88 (01-02-25 @ 05:14)  WBC Count: 9.77 (01-01-25 @ 04:24)     - Creatinine: 0.8 (01-02-25 @ 05:14)  Creatinine: 0.7 (01-01-25 @ 04:24)       ROS  General: Denies rigors, nightsweats  HEENT: Denies headache, rhinorrhea, sore throat, eye pain  CV: Denies CP, palpitations  PULM: Denies wheezing, hemoptysis  GI: Denies hematemesis, hematochezia, melena  : Denies discharge, hematuria  MSK: Denies arthralgias, myalgias  SKIN: Denies rash, lesions  NEURO: Denies paresthesias, weakness  PSYCH: Denies depression, anxiety    VITALS:  T(F): 99, Max: 99.3 (01-01-25 @ 20:00)  HR: 62  BP: 91/55  RR: 17Vital Signs Last 24 Hrs  T(C): 37.2 (02 Jan 2025 04:00), Max: 37.4 (01 Jan 2025 20:00)  T(F): 99 (02 Jan 2025 04:00), Max: 99.3 (01 Jan 2025 20:00)  HR: 62 (02 Jan 2025 09:00) (59 - 80)  BP: 91/55 (02 Jan 2025 09:00) (80/52 - 139/112)  BP(mean): 67 (02 Jan 2025 09:00) (61 - 122)  RR: 17 (02 Jan 2025 09:00) (17 - 35)  SpO2: 95% (02 Jan 2025 09:00) (90% - 98%)    Parameters below as of 02 Jan 2025 08:00  Patient On (Oxygen Delivery Method): nasal cannula        PHYSICAL EXAM:  Gen: NAD, resting in bed  HEENT: Normocephalic, atraumatic  Neck: supple, no lymphadenopathy  CV: Regular rate & regular rhythm  Lungs: decreased BS at bases, no fremitus  Abdomen: Soft, BS present  Ext: Warm, well perfused  Neuro: non focal, awake  Skin: no rash, no erythema  Lines: no phlebitis    FH: Non-contributory  Social Hx: Non-contributory    TESTS & MEASUREMENTS:                        12.2   10.88 )-----------( 427      ( 02 Jan 2025 05:14 )             37.2     01-02    133[L]  |  96[L]  |  15  ----------------------------<  159[H]  3.9   |  26  |  0.8    Ca    7.6[L]      02 Jan 2025 05:14  Phos  4.1     01-02  Mg     1.9     01-02    TPro  5.7[L]  /  Alb  2.7[L]  /  TBili  <0.2  /  DBili  x   /  AST  26  /  ALT  27  /  AlkPhos  155[H]  01-02      LIVER FUNCTIONS - ( 02 Jan 2025 05:14 )  Alb: 2.7 g/dL / Pro: 5.7 g/dL / ALK PHOS: 155 U/L / ALT: 27 U/L / AST: 26 U/L / GGT: x           Urinalysis Basic - ( 02 Jan 2025 05:14 )    Color: x / Appearance: x / SG: x / pH: x  Gluc: 159 mg/dL / Ketone: x  / Bili: x / Urobili: x   Blood: x / Protein: x / Nitrite: x   Leuk Esterase: x / RBC: x / WBC x   Sq Epi: x / Non Sq Epi: x / Bacteria: x        Culture - Blood (collected 12-26-24 @ 23:37)  Source: .Blood BLOOD  Final Report (01-01-25 @ 07:00):    No growth at 5 days    Culture - Blood (collected 12-26-24 @ 23:37)  Source: .Blood BLOOD  Final Report (01-01-25 @ 07:00):    No growth at 5 days            INFECTIOUS DISEASES TESTING  MRSA PCR Result.: Negative (12-30-24 @ 22:36)  Procalcitonin: 0.16 (12-30-24 @ 10:40)      INFLAMMATORY MARKERS  C-Reactive Protein: 69.2 mg/L (01-02-25 @ 05:14)  C-Reactive Protein: 93.5 mg/L (01-01-25 @ 04:24)  C-Reactive Protein: 101.8 mg/L (12-31-24 @ 20:20)  Sedimentation Rate, Erythrocyte: 120 mm/Hr (12-26-24 @ 22:35)      RADIOLOGY & ADDITIONAL TESTS:  I have personally reviewed the last available Chest xray  CXR      CT      CARDIOLOGY TESTING  12 Lead ECG:   Ventricular Rate 73 BPM    Atrial Rate 73 BPM    P-R Interval 172 ms    QRS Duration 98 ms    Q-T Interval 464 ms    QTC Calculation(Bazett) 511 ms    P Axis 11 degrees    R Axis 156 degrees    T Axis 226 degrees    Diagnosis Line Normal sinus rhythm  Low voltage QRS  Anterolateral infarct , age undetermined  Abnormal ECG    Confirmed by Dar Rivera ([id number]) on 1/1/2025 12:34:50 PM (12-31-24 @ 05:53)  12 Lead ECG:   Ventricular Rate 81 BPM    Atrial Rate 81 BPM    P-R Interval 172 ms    QRS Duration 100 ms    Q-T Interval 434 ms    QTC Calculation(Bazett) 504 ms    P Axis 57 degrees    R Axis 142 degrees    T Axis 201 degrees    Diagnosis Line Normal sinus rhythm  Anterolateral infarct , age undetermined  Prolonged QT  Abnormal ECG    Confirmed by Dar Rivera [id number]) on 12/31/2024 3:31:47 PM (12-30-24 @ 10:22)      MEDICATIONS  aMIOdarone    Tablet 400 Oral two times a day  aspirin  chewable 81 Oral daily  buMETAnide Injectable 2 IV Push every 8 hours  chlorhexidine 2% Cloths 1 Topical daily  glucagon  Injectable 1 IntraMuscular once  guaifenesin/dextromethorphan Oral Liquid 10 Oral every 4 hours  heparin  Infusion.  IV Continuous <Continuous>  insulin glargine Injectable (LANTUS) 27 SubCutaneous at bedtime  insulin lispro (ADMELOG) corrective regimen sliding scale  SubCutaneous three times a day before meals  insulin lispro Injectable (ADMELOG) 9 SubCutaneous three times a day before meals  losartan 25 Oral daily  magnesium sulfate  IVPB 1 IV Intermittent once  melatonin 5 Oral at bedtime  metoprolol tartrate 12.5 Oral every 6 hours  nicotine -  14 mG/24Hr(s) Patch 1 Transdermal every 24 hours  pantoprazole  Injectable 40 IV Push with breakfast  ticagrelor 90 Oral every 12 hours      WEIGHT  Weight (kg): 96 (12-27-24 @ 01:00)  Creatinine: 0.8 mg/dL (01-02-25 @ 05:14)      ANTIBIOTICS:      All available historical records have been reviewed

## 2025-01-02 NOTE — PROGRESS NOTE ADULT - ASSESSMENT
IMPRESSION:    NSTEMI   HFREF with severely reduced EF   VTACH s/p cardioversion   LV thrombus   COVID pneumonia   Acute hypoxemic respiratory failure     PLAN:    CNS: MS adequate.     HEENT: Oral care    PULMONARY:  CXR with improved bilateral infiltrates. On LFNC now. Clinically better.     CARDIOVASCULAR: Daily IVC assessment. Diuretics per cardiology; cath when stable. Fluid balance negative last 24 hours. Consider Diamox    GI: GI prophylaxis.  Feeding as tolerated.     RENAL:  Follow up lytes.  Correct as needed. Kidney at baseline. No davis.     INFECTIOUS DISEASE: Afebrile. blood culture negative. Check sputum culture. COVID positive. ID evaluation noted. Procal 0.16. No steroids because of the late MI presentation. Inflammatory markers elevated but trending down    HEMATOLOGICAL:  On therapeutic AC. No drop in hg. PTT therapeutic.     ENDOCRINE:  Follow up FS.  Insulin protocol if needed.     MUSCULOSKELETAL: Out of bed.     CCU care. Will follow.

## 2025-01-02 NOTE — PROGRESS NOTE ADULT - ASSESSMENT
ASSESSMENT  44M with HTN, IDDM, HLD, active smoker (1/2 PPD x 28 yrs), hepatitis B (s/p treatment) presents with CC of chest pressure and SOB.      IMPRESSION  #Acute Hypoxemic Respiratory failure  #ARDS  - CT Chest No Cont (12.28.24 @ 16:56): IMPRESSION: Findings consistent with ARDS    #Pulmonary Edema  #NSTEMI  #VTach   #Acute HFrEF  - TTE 12/27 - EF 25-30%     #LV Thrombus  #COVID-19, recent infection   #DM II  #Hx of Hep B     #Obesity BMI (kg/m2): 32.2  #Abx allergy: No Known Drug Allergies    RECOMMENDATIONS  - inflammatory markers down trending -- clinically improving  - monitor off antibiotics/steroids   - recall as needed    Please call or message on Microsoft Teams if with any questions.  Spectra [phone]

## 2025-01-02 NOTE — PROGRESS NOTE ADULT - SUBJECTIVE AND OBJECTIVE BOX
Patient is a 44y old  Male who presents with a chief complaint of Late presentation MI (01 Jan 2025 12:49)    HPI:  44M with HTN, IDDM, HLD, active smoker (1/2 PPD x 28 yrs), hepatitis B (s/p treatment) presents with CC of chest pressure and SOB. 2 weeks ago, he started having cough w/ yellow and white sputum runny nose, generalized body aches. Then started developing REGAN about a week ago along with left sided chest pressure, initially about 6-7/10. The chest pressure/SOB are worse with laying flat, improve with leaning forward. No nausea vomiting, reports loose BM 1-2 per day x 2 weeks. Reports burning at end of urination about a week ago which has resolved.  His last alcoholic drink was 3 weeks ago. Also uses Timothy Dust occasionally, last use few weeks ago. Denies any injected drugs or cocaine.  Has not taken his meds including insulin in the last 2 weeks due to feeling sick and not eating much.    Triage VS: /98, , RR 26, SpO2 92% on RA, T 98.6F.  Labs: WBC 11.77k, D-dimer 543, CRP 91, HS troponin 400, pro-BNP 4126.  UA w/ 100 protein, 500 glucose, otherwise negative.  SARS-CoV-2 positive.  CXR w/ bilateral opacities.    ED interventions: ASA 325mg, ticagrelor 180mg, DuoNeb, ketorolac, 125mg IV methylprednisolone, 80mg IV furosemide. Was also placed on BIPAP which he did not tolerate (made him very anxious), despite repeated counseling. (27 Dec 2024 00:27)      SUBJ:  Patient seen and examined.      MEDICATIONS  (STANDING):  aMIOdarone    Tablet 400 milliGRAM(s) Oral two times a day  aspirin  chewable 81 milliGRAM(s) Oral daily  chlorhexidine 2% Cloths 1 Application(s) Topical daily  clopidogrel Tablet 75 milliGRAM(s) Oral daily  glucagon  Injectable 1 milliGRAM(s) IntraMuscular once  guaifenesin/dextromethorphan Oral Liquid 10 milliLiter(s) Oral every 4 hours  heparin  Infusion.  Unit(s)/Hr (18 mL/Hr) IV Continuous <Continuous>  insulin glargine Injectable (LANTUS) 27 Unit(s) SubCutaneous at bedtime  insulin lispro (ADMELOG) corrective regimen sliding scale   SubCutaneous three times a day before meals  insulin lispro Injectable (ADMELOG) 9 Unit(s) SubCutaneous three times a day before meals  lactated ringers. 500 milliLiter(s) (500 mL/Hr) IV Continuous <Continuous>  losartan 25 milliGRAM(s) Oral daily  melatonin 5 milliGRAM(s) Oral at bedtime  metoprolol tartrate 12.5 milliGRAM(s) Oral every 6 hours  nicotine -  14 mG/24Hr(s) Patch 1 Patch Transdermal every 24 hours  pantoprazole  Injectable 40 milliGRAM(s) IV Push with breakfast  regadenoson Injectable 0.4 milliGRAM(s) IV Push once    MEDICATIONS  (PRN):  acetaminophen     Tablet .. 650 milliGRAM(s) Oral every 6 hours PRN Mild Pain (1 - 3)  benzocaine/menthol Lozenge 1 Lozenge Oral every 3 hours PRN Sore Throat  heparin   Injectable 8000 Unit(s) IV Push every 6 hours PRN For aPTT less than 40  heparin   Injectable 4000 Unit(s) IV Push every 6 hours PRN For aPTT between 40 - 57            Vital Signs Last 24 Hrs  T(C): 36.1 (02 Jan 2025 16:00), Max: 37.4 (01 Jan 2025 20:00)  T(F): 96.9 (02 Jan 2025 16:00), Max: 99.3 (01 Jan 2025 20:00)  HR: 69 (02 Jan 2025 17:10) (59 - 76)  BP: 103/51 (02 Jan 2025 17:10) (80/52 - 167/65)  BP(mean): 72 (02 Jan 2025 17:10) (61 - 94)  RR: 30 (02 Jan 2025 17:00) (17 - 35)  SpO2: 97% (02 Jan 2025 17:00) (90% - 98%)    Parameters below as of 02 Jan 2025 17:00  Patient On (Oxygen Delivery Method): nasal cannula  O2 Flow (L/min): 4        PHYSICAL EXAM:    GEN: AAO x 3, NAD  HEENT: NC/AT, PERRL  Neck: No JVD, no bruits  CV: Reg, S1-S2, no murmur  Lungs: CTAB  Abd: Soft, non-tender  Ext: No edema        01-01-25 @ 07:01 - 01-02-25 @ 07:00  --------------------------------------------------------  IN: 692 mL / OUT: 4375 mL / NET: -3683 mL    01-02-25 @ 07:01 - 01-02-25 @ 18:07  --------------------------------------------------------  IN: 876 mL / OUT: 1100 mL / NET: -224 mL        I&O's Summary    01 Jan 2025 07:01  -  02 Jan 2025 07:00  --------------------------------------------------------  IN: 692 mL / OUT: 4375 mL / NET: -3683 mL    02 Jan 2025 07:01  -  02 Jan 2025 18:07  --------------------------------------------------------  IN: 876 mL / OUT: 1100 mL / NET: -224 mL    	    TELEMETRY:    ECG:    TTE:  < from: TTE Echo Complete w/ Contrast w/o Doppler (12.27.24 @ 11:20) >    Summary:   1. Endocardial visualization was enhanced with intravenous echo contrast.   2. Left ventricular ejection fraction, by visual estimation, is 25 to   30%.  3. Severely decreased global left ventricular systolic function.   4. Multiple left ventricular regional wall motion abnormalities exist.   See wall motion findings.   5. Spectral Doppler shows pseudonormal pattern of left ventricular   myocardial filling (Grade II diastolic dysfunction).   6. Small, fixed, anteroapical left ventricular thrombus.   7. Right ventricular apical hypokinesis.   8. Mild mitral regurgitation.   9. Findings discussed with CCU team.      < end of copied text >      LABS:                        12.2   10.88 )-----------( 427      ( 02 Jan 2025 05:14 )             37.2     01-02    133[L]  |  96[L]  |  15  ----------------------------<  159[H]  3.9   |  26  |  0.8    Ca    7.6[L]      02 Jan 2025 05:14  Phos  4.1     01-02  Mg     1.9     01-02    TPro  5.7[L]  /  Alb  2.7[L]  /  TBili  <0.2  /  DBili  x   /  AST  26  /  ALT  27  /  AlkPhos  155[H]  01-02        PTT - ( 02 Jan 2025 05:14 )  PTT:90.5 sec      BNP  RADIOLOGY & ADDITIONAL STUDIES:      IMPRESSION AND PLAN:

## 2025-01-02 NOTE — PROGRESS NOTE ADULT - SUBJECTIVE AND OBJECTIVE BOX
Patient is a 44y old  Male who presents with a chief complaint of Late presentation MI (01 Jan 2025 12:49)      INTERVAL HPI/OVERNIGHT EVENTS:   No overnight events. This morning patient at bedside with soft BP, satting well on 4L NC, afebrile. Patient reports feeling better with improved breathing. Also endorses chest pain stabbing in nature non radiating worse when he coughs. Otherwise ROS was negative.     ICU Vital Signs Last 24 Hrs  T(C): 36.1 (02 Jan 2025 16:00), Max: 37.4 (01 Jan 2025 20:00)  T(F): 96.9 (02 Jan 2025 16:00), Max: 99.3 (01 Jan 2025 20:00)  HR: 69 (02 Jan 2025 17:10) (59 - 76)  BP: 103/51 (02 Jan 2025 17:10) (80/52 - 167/65)  BP(mean): 72 (02 Jan 2025 17:10) (61 - 94)  ABP: --  ABP(mean): --  RR: 30 (02 Jan 2025 17:00) (17 - 35)  SpO2: 97% (02 Jan 2025 17:00) (90% - 98%)    O2 Parameters below as of 02 Jan 2025 17:00  Patient On (Oxygen Delivery Method): nasal cannula  O2 Flow (L/min): 4        I&O's Summary    01 Jan 2025 07:01  -  02 Jan 2025 07:00  --------------------------------------------------------  IN: 692 mL / OUT: 4375 mL / NET: -3683 mL    02 Jan 2025 07:01  -  02 Jan 2025 18:00  --------------------------------------------------------  IN: 876 mL / OUT: 1100 mL / NET: -224 mL          LABS:                        12.2   10.88 )-----------( 427      ( 02 Jan 2025 05:14 )             37.2     01-02    133[L]  |  96[L]  |  15  ----------------------------<  159[H]  3.9   |  26  |  0.8    Ca    7.6[L]      02 Jan 2025 05:14  Phos  4.1     01-02  Mg     1.9     01-02    TPro  5.7[L]  /  Alb  2.7[L]  /  TBili  <0.2  /  DBili  x   /  AST  26  /  ALT  27  /  AlkPhos  155[H]  01-02    PTT - ( 02 Jan 2025 05:14 )  PTT:90.5 sec  Urinalysis Basic - ( 02 Jan 2025 05:14 )    Color: x / Appearance: x / SG: x / pH: x  Gluc: 159 mg/dL / Ketone: x  / Bili: x / Urobili: x   Blood: x / Protein: x / Nitrite: x   Leuk Esterase: x / RBC: x / WBC x   Sq Epi: x / Non Sq Epi: x / Bacteria: x      CAPILLARY BLOOD GLUCOSE      POCT Blood Glucose.: 164 mg/dL (02 Jan 2025 15:49)  POCT Blood Glucose.: 187 mg/dL (02 Jan 2025 11:39)  POCT Blood Glucose.: 160 mg/dL (02 Jan 2025 07:42)  POCT Blood Glucose.: 153 mg/dL (01 Jan 2025 22:17)    ABG - ( 02 Jan 2025 04:35 )  pH, Arterial: 7.51  pH, Blood: x     /  pCO2: 36    /  pO2: 66    / HCO3: 29    / Base Excess: 5.6   /  SaO2: 94.6                  RADIOLOGY & ADDITIONAL TESTS:    MICRO:      Consultant(s) Notes Reviewed:  [x ] YES  [ ] NO    MEDICATIONS  (STANDING):  aMIOdarone    Tablet 400 milliGRAM(s) Oral two times a day  aspirin  chewable 81 milliGRAM(s) Oral daily  chlorhexidine 2% Cloths 1 Application(s) Topical daily  clopidogrel Tablet 75 milliGRAM(s) Oral daily  glucagon  Injectable 1 milliGRAM(s) IntraMuscular once  guaifenesin/dextromethorphan Oral Liquid 10 milliLiter(s) Oral every 4 hours  heparin  Infusion.  Unit(s)/Hr (18 mL/Hr) IV Continuous <Continuous>  insulin glargine Injectable (LANTUS) 27 Unit(s) SubCutaneous at bedtime  insulin lispro (ADMELOG) corrective regimen sliding scale   SubCutaneous three times a day before meals  insulin lispro Injectable (ADMELOG) 9 Unit(s) SubCutaneous three times a day before meals  lactated ringers. 500 milliLiter(s) (500 mL/Hr) IV Continuous <Continuous>  losartan 25 milliGRAM(s) Oral daily  melatonin 5 milliGRAM(s) Oral at bedtime  metoprolol tartrate 12.5 milliGRAM(s) Oral every 6 hours  nicotine -  14 mG/24Hr(s) Patch 1 Patch Transdermal every 24 hours  pantoprazole  Injectable 40 milliGRAM(s) IV Push with breakfast  regadenoson Injectable 0.4 milliGRAM(s) IV Push once    MEDICATIONS  (PRN):  acetaminophen     Tablet .. 650 milliGRAM(s) Oral every 6 hours PRN Mild Pain (1 - 3)  benzocaine/menthol Lozenge 1 Lozenge Oral every 3 hours PRN Sore Throat  heparin   Injectable 8000 Unit(s) IV Push every 6 hours PRN For aPTT less than 40  heparin   Injectable 4000 Unit(s) IV Push every 6 hours PRN For aPTT between 40 - 57      PHYSICAL EXAM:  GENERAL:  on the chair, comfortable, on 4L NC  NECK: Supple, No JVD, No HJR  CHEST/LUNG: B/L good air entry, crackles on the right  HEART: S1S2 normal, Regular rate and rhythm; No murmurs  ABDOMEN: Soft, Nontender, Nondistended; Bowel sounds present  EXTREMITIES:  2+ Peripheral Pulses, 1-2+ pitting edema     A/P:    Care Discussed with Consultants/Other Providers [ x] YES  [ ] NO

## 2025-01-02 NOTE — PROGRESS NOTE ADULT - SUBJECTIVE AND OBJECTIVE BOX
Patient is a 44y old  Male who presents with a chief complaint of Late presentation MI (01 Jan 2025 12:49)      Over Night Events: on 4L LFNC now.        ROS:     All ROS are negative except HPI         PHYSICAL EXAM    ICU Vital Signs Last 24 Hrs  T(C): 37.2 (02 Jan 2025 04:00), Max: 37.4 (01 Jan 2025 20:00)  T(F): 99 (02 Jan 2025 04:00), Max: 99.3 (01 Jan 2025 20:00)  HR: 62 (02 Jan 2025 09:00) (59 - 80)  BP: 91/55 (02 Jan 2025 09:00) (80/52 - 139/112)  BP(mean): 67 (02 Jan 2025 09:00) (61 - 122)  ABP: --  ABP(mean): --  RR: 17 (02 Jan 2025 09:00) (17 - 35)  SpO2: 95% (02 Jan 2025 09:00) (90% - 98%)    O2 Parameters below as of 02 Jan 2025 08:00  Patient On (Oxygen Delivery Method): nasal cannula        CONSTITUTIONAL:  NAD    ENT:   Airway patent,   Mouth with normal mucosa.   No thrush    EYES:   Pupils equal,   Round and reactive to light.    CARDIAC:   Normal rate,   Regular rhythm.    LE edema    RESPIRATORY:   Rt crackles  Normal chest expansion  Not tachypneic,  No use of accessory muscles    GASTROINTESTINAL:  Abdomen soft,   Non-tender,   No guarding,   + BS    MUSCULOSKELETAL:   Range of motion is not limited,  No clubbing, cyanosis    NEUROLOGICAL:   Alert and oriented   No motor  deficits.    SKIN:   Skin normal color for race,   Warm and dry and intact.   No evidence of rash.        01-01-25 @ 07:01  -  01-02-25 @ 07:00  --------------------------------------------------------  IN:    Heparin Infusion: 352 mL    Oral Fluid: 340 mL  Total IN: 692 mL    OUT:    Voided (mL): 4375 mL  Total OUT: 4375 mL    Total NET: -3683 mL          LABS:                            12.2   10.88 )-----------( 427      ( 02 Jan 2025 05:14 )             37.2                                               01-02    133[L]  |  96[L]  |  15  ----------------------------<  159[H]  3.9   |  26  |  0.8    Ca    7.6[L]      02 Jan 2025 05:14  Phos  4.1     01-02  Mg     1.9     01-02    TPro  5.7[L]  /  Alb  2.7[L]  /  TBili  <0.2  /  DBili  x   /  AST  26  /  ALT  27  /  AlkPhos  155[H]  01-02      PTT - ( 02 Jan 2025 05:14 )  PTT:90.5 sec                                       Urinalysis Basic - ( 02 Jan 2025 05:14 )    Color: x / Appearance: x / SG: x / pH: x  Gluc: 159 mg/dL / Ketone: x  / Bili: x / Urobili: x   Blood: x / Protein: x / Nitrite: x   Leuk Esterase: x / RBC: x / WBC x   Sq Epi: x / Non Sq Epi: x / Bacteria: x                                                  LIVER FUNCTIONS - ( 02 Jan 2025 05:14 )  Alb: 2.7 g/dL / Pro: 5.7 g/dL / ALK PHOS: 155 U/L / ALT: 27 U/L / AST: 26 U/L / GGT: x                                                                                                                                   ABG - ( 02 Jan 2025 04:35 )  pH, Arterial: 7.51  pH, Blood: x     /  pCO2: 36    /  pO2: 66    / HCO3: 29    / Base Excess: 5.6   /  SaO2: 94.6                MEDICATIONS  (STANDING):  aMIOdarone    Tablet 400 milliGRAM(s) Oral two times a day  aspirin  chewable 81 milliGRAM(s) Oral daily  buMETAnide Injectable 2 milliGRAM(s) IV Push every 8 hours  chlorhexidine 2% Cloths 1 Application(s) Topical daily  glucagon  Injectable 1 milliGRAM(s) IntraMuscular once  guaifenesin/dextromethorphan Oral Liquid 10 milliLiter(s) Oral every 4 hours  heparin  Infusion.  Unit(s)/Hr (18 mL/Hr) IV Continuous <Continuous>  insulin glargine Injectable (LANTUS) 27 Unit(s) SubCutaneous at bedtime  insulin lispro (ADMELOG) corrective regimen sliding scale   SubCutaneous three times a day before meals  insulin lispro Injectable (ADMELOG) 9 Unit(s) SubCutaneous three times a day before meals  losartan 25 milliGRAM(s) Oral daily  melatonin 5 milliGRAM(s) Oral at bedtime  metoprolol tartrate 12.5 milliGRAM(s) Oral every 6 hours  nicotine -  14 mG/24Hr(s) Patch 1 Patch Transdermal every 24 hours  pantoprazole  Injectable 40 milliGRAM(s) IV Push with breakfast  ticagrelor 90 milliGRAM(s) Oral every 12 hours    MEDICATIONS  (PRN):  acetaminophen     Tablet .. 650 milliGRAM(s) Oral every 6 hours PRN Mild Pain (1 - 3)  benzocaine/menthol Lozenge 1 Lozenge Oral every 3 hours PRN Sore Throat  heparin   Injectable 8000 Unit(s) IV Push every 6 hours PRN For aPTT less than 40  heparin   Injectable 4000 Unit(s) IV Push every 6 hours PRN For aPTT between 40 - 57      New X-rays reviewed:                                                                                  ECHO    CXR interpreted by me:

## 2025-01-03 LAB
ALBUMIN SERPL ELPH-MCNC: 2.9 G/DL — LOW (ref 3.5–5.2)
ALP SERPL-CCNC: 152 U/L — HIGH (ref 30–115)
ALT FLD-CCNC: 24 U/L — SIGNIFICANT CHANGE UP (ref 0–41)
ANION GAP SERPL CALC-SCNC: 13 MMOL/L — SIGNIFICANT CHANGE UP (ref 7–14)
APTT BLD: 85.7 SEC — CRITICAL HIGH (ref 27–39.2)
AST SERPL-CCNC: 25 U/L — SIGNIFICANT CHANGE UP (ref 0–41)
BASOPHILS # BLD AUTO: 0.06 K/UL — SIGNIFICANT CHANGE UP (ref 0–0.2)
BASOPHILS NFR BLD AUTO: 0.5 % — SIGNIFICANT CHANGE UP (ref 0–1)
BILIRUB SERPL-MCNC: <0.2 MG/DL — SIGNIFICANT CHANGE UP (ref 0.2–1.2)
BUN SERPL-MCNC: 17 MG/DL — SIGNIFICANT CHANGE UP (ref 10–20)
CALCIUM SERPL-MCNC: 7.8 MG/DL — LOW (ref 8.4–10.5)
CHLORIDE SERPL-SCNC: 97 MMOL/L — LOW (ref 98–110)
CO2 SERPL-SCNC: 24 MMOL/L — SIGNIFICANT CHANGE UP (ref 17–32)
CREAT SERPL-MCNC: 0.8 MG/DL — SIGNIFICANT CHANGE UP (ref 0.7–1.5)
CRP SERPL-MCNC: 55.6 MG/L — HIGH
EGFR: 112 ML/MIN/1.73M2 — SIGNIFICANT CHANGE UP
EOSINOPHIL # BLD AUTO: 0.75 K/UL — HIGH (ref 0–0.7)
EOSINOPHIL NFR BLD AUTO: 6.4 % — SIGNIFICANT CHANGE UP (ref 0–8)
GLUCOSE BLDC GLUCOMTR-MCNC: 136 MG/DL — HIGH (ref 70–99)
GLUCOSE BLDC GLUCOMTR-MCNC: 159 MG/DL — HIGH (ref 70–99)
GLUCOSE BLDC GLUCOMTR-MCNC: 174 MG/DL — HIGH (ref 70–99)
GLUCOSE BLDC GLUCOMTR-MCNC: 186 MG/DL — HIGH (ref 70–99)
GLUCOSE SERPL-MCNC: 142 MG/DL — HIGH (ref 70–99)
HCT VFR BLD CALC: 36.4 % — LOW (ref 42–52)
HGB BLD-MCNC: 12.2 G/DL — LOW (ref 14–18)
IMM GRANULOCYTES NFR BLD AUTO: 1.5 % — HIGH (ref 0.1–0.3)
LYMPHOCYTES # BLD AUTO: 2.39 K/UL — SIGNIFICANT CHANGE UP (ref 1.2–3.4)
LYMPHOCYTES # BLD AUTO: 20.4 % — LOW (ref 20.5–51.1)
MAGNESIUM SERPL-MCNC: 2.1 MG/DL — SIGNIFICANT CHANGE UP (ref 1.8–2.4)
MCHC RBC-ENTMCNC: 31.1 PG — HIGH (ref 27–31)
MCHC RBC-ENTMCNC: 33.5 G/DL — SIGNIFICANT CHANGE UP (ref 32–37)
MCV RBC AUTO: 92.9 FL — SIGNIFICANT CHANGE UP (ref 80–94)
MONOCYTES # BLD AUTO: 0.91 K/UL — HIGH (ref 0.1–0.6)
MONOCYTES NFR BLD AUTO: 7.8 % — SIGNIFICANT CHANGE UP (ref 1.7–9.3)
NEUTROPHILS # BLD AUTO: 7.41 K/UL — HIGH (ref 1.4–6.5)
NEUTROPHILS NFR BLD AUTO: 63.4 % — SIGNIFICANT CHANGE UP (ref 42.2–75.2)
NRBC # BLD: 0 /100 WBCS — SIGNIFICANT CHANGE UP (ref 0–0)
PHOSPHATE SERPL-MCNC: 4.3 MG/DL — SIGNIFICANT CHANGE UP (ref 2.1–4.9)
PLATELET # BLD AUTO: 453 K/UL — HIGH (ref 130–400)
PMV BLD: 9.4 FL — SIGNIFICANT CHANGE UP (ref 7.4–10.4)
POTASSIUM SERPL-MCNC: 4.3 MMOL/L — SIGNIFICANT CHANGE UP (ref 3.5–5)
POTASSIUM SERPL-SCNC: 4.3 MMOL/L — SIGNIFICANT CHANGE UP (ref 3.5–5)
PROT SERPL-MCNC: 5.9 G/DL — LOW (ref 6–8)
RBC # BLD: 3.92 M/UL — LOW (ref 4.7–6.1)
RBC # FLD: 12.2 % — SIGNIFICANT CHANGE UP (ref 11.5–14.5)
SODIUM SERPL-SCNC: 134 MMOL/L — LOW (ref 135–146)
WBC # BLD: 11.69 K/UL — HIGH (ref 4.8–10.8)
WBC # FLD AUTO: 11.69 K/UL — HIGH (ref 4.8–10.8)

## 2025-01-03 PROCEDURE — 71045 X-RAY EXAM CHEST 1 VIEW: CPT | Mod: 26

## 2025-01-03 PROCEDURE — 93010 ELECTROCARDIOGRAM REPORT: CPT

## 2025-01-03 PROCEDURE — 99291 CRITICAL CARE FIRST HOUR: CPT

## 2025-01-03 RX ORDER — ENOXAPARIN SODIUM 60 MG/.6ML
100 INJECTION INTRAVENOUS; SUBCUTANEOUS EVERY 12 HOURS
Refills: 0 | Status: DISCONTINUED | OUTPATIENT
Start: 2025-01-03 | End: 2025-01-13

## 2025-01-03 RX ADMIN — NICOTINE POLACRILEX 1 PATCH: 4 LOZENGE ORAL at 00:12

## 2025-01-03 RX ADMIN — ENOXAPARIN SODIUM 100 MILLIGRAM(S): 60 INJECTION INTRAVENOUS; SUBCUTANEOUS at 17:23

## 2025-01-03 RX ADMIN — Medication 12.5 MILLIGRAM(S): at 11:33

## 2025-01-03 RX ADMIN — ACETAMINOPHEN 650 MILLIGRAM(S): 80 SOLUTION/ DROPS ORAL at 23:15

## 2025-01-03 RX ADMIN — PANTOPRAZOLE 40 MILLIGRAM(S): 40 TABLET, DELAYED RELEASE ORAL at 08:00

## 2025-01-03 RX ADMIN — Medication 1: at 11:34

## 2025-01-03 RX ADMIN — BENZOCAINE AND MENTHOL 1 LOZENGE: 15; 3.6 LOZENGE ORAL at 05:29

## 2025-01-03 RX ADMIN — INSULIN GLARGINE-YFGN 27 UNIT(S): 100 INJECTION, SOLUTION SUBCUTANEOUS at 22:15

## 2025-01-03 RX ADMIN — Medication 10 MILLILITER(S): at 10:19

## 2025-01-03 RX ADMIN — CLOPIDOGREL BISULFATE 75 MILLIGRAM(S): 75 TABLET, FILM COATED ORAL at 11:33

## 2025-01-03 RX ADMIN — AMIODARONE HYDROCHLORIDE 400 MILLIGRAM(S): 200 TABLET ORAL at 05:26

## 2025-01-03 RX ADMIN — Medication 9 UNIT(S): at 16:09

## 2025-01-03 RX ADMIN — CHLORHEXIDINE GLUCONATE 1 APPLICATION(S): 1.2 RINSE ORAL at 11:35

## 2025-01-03 RX ADMIN — LOSARTAN POTASSIUM 25 MILLIGRAM(S): 100 TABLET, FILM COATED ORAL at 05:26

## 2025-01-03 RX ADMIN — Medication 9 UNIT(S): at 11:34

## 2025-01-03 RX ADMIN — Medication 9 UNIT(S): at 07:50

## 2025-01-03 RX ADMIN — ACETAMINOPHEN 650 MILLIGRAM(S): 80 SOLUTION/ DROPS ORAL at 22:16

## 2025-01-03 RX ADMIN — HEPARIN SODIUM 1600 UNIT(S)/HR: 1000 INJECTION, SOLUTION INTRAVENOUS; SUBCUTANEOUS at 06:48

## 2025-01-03 RX ADMIN — Medication 12.5 MILLIGRAM(S): at 17:23

## 2025-01-03 RX ADMIN — BENZOCAINE AND MENTHOL 1 LOZENGE: 15; 3.6 LOZENGE ORAL at 08:47

## 2025-01-03 RX ADMIN — Medication 81 MILLIGRAM(S): at 11:33

## 2025-01-03 RX ADMIN — Medication 5 MILLIGRAM(S): at 22:17

## 2025-01-03 RX ADMIN — BENZOCAINE AND MENTHOL 1 LOZENGE: 15; 3.6 LOZENGE ORAL at 17:25

## 2025-01-03 RX ADMIN — AMIODARONE HYDROCHLORIDE 400 MILLIGRAM(S): 200 TABLET ORAL at 17:24

## 2025-01-03 RX ADMIN — Medication 12.5 MILLIGRAM(S): at 05:26

## 2025-01-03 RX ADMIN — NICOTINE POLACRILEX 1 PATCH: 4 LOZENGE ORAL at 19:14

## 2025-01-03 RX ADMIN — Medication 1: at 16:08

## 2025-01-03 RX ADMIN — NICOTINE POLACRILEX 1 PATCH: 4 LOZENGE ORAL at 07:54

## 2025-01-03 NOTE — PROGRESS NOTE ADULT - SUBJECTIVE AND OBJECTIVE BOX
Patient is a 44y old  Male who presents with a chief complaint of MI (02 Jan 2025 18:07)      INTERVAL HPI/OVERNIGHT EVENTS:   No overnight events. This morning patient says his breathing is doing a lot better. Otherwise the chest pain remains the same, worse when he coughs. ROS was otherwise negative. Patient HD stable though BP is soft, Afebrile satting well on 4L NC.    ICU Vital Signs Last 24 Hrs  T(C): 36.8 (03 Jan 2025 08:00), Max: 36.8 (03 Jan 2025 08:00)  T(F): 98.3 (03 Jan 2025 08:00), Max: 98.3 (03 Jan 2025 08:00)  HR: 67 (03 Jan 2025 11:00) (63 - 77)  BP: 91/55 (03 Jan 2025 11:00) (91/55 - 167/65)  BP(mean): 68 (03 Jan 2025 11:00) (67 - 94)  ABP: --  ABP(mean): --  RR: 26 (03 Jan 2025 11:00) (21 - 36)  SpO2: 94% (03 Jan 2025 11:00) (92% - 98%)    O2 Parameters below as of 03 Jan 2025 11:00  Patient On (Oxygen Delivery Method): nasal cannula  O2 Flow (L/min): 4        I&O's Summary    02 Jan 2025 07:01  -  03 Jan 2025 07:00  --------------------------------------------------------  IN: 1324 mL / OUT: 1825 mL / NET: -501 mL    03 Jan 2025 07:01  -  03 Jan 2025 11:44  --------------------------------------------------------  IN: 0 mL / OUT: 200 mL / NET: -200 mL          LABS:                        12.2   11.69 )-----------( 453      ( 03 Jan 2025 05:25 )             36.4     01-03    134[L]  |  97[L]  |  17  ----------------------------<  142[H]  4.3   |  24  |  0.8    Ca    7.8[L]      03 Jan 2025 05:25  Phos  4.3     01-03  Mg     2.1     01-03    TPro  5.9[L]  /  Alb  2.9[L]  /  TBili  <0.2  /  DBili  x   /  AST  25  /  ALT  24  /  AlkPhos  152[H]  01-03    PTT - ( 03 Jan 2025 05:25 )  PTT:85.7 sec  Urinalysis Basic - ( 03 Jan 2025 05:25 )    Color: x / Appearance: x / SG: x / pH: x  Gluc: 142 mg/dL / Ketone: x  / Bili: x / Urobili: x   Blood: x / Protein: x / Nitrite: x   Leuk Esterase: x / RBC: x / WBC x   Sq Epi: x / Non Sq Epi: x / Bacteria: x      CAPILLARY BLOOD GLUCOSE      POCT Blood Glucose.: 174 mg/dL (03 Jan 2025 11:30)  POCT Blood Glucose.: 136 mg/dL (03 Jan 2025 07:39)  POCT Blood Glucose.: 153 mg/dL (02 Jan 2025 21:42)  POCT Blood Glucose.: 164 mg/dL (02 Jan 2025 15:49)    ABG - ( 02 Jan 2025 04:35 )  pH, Arterial: 7.51  pH, Blood: x     /  pCO2: 36    /  pO2: 66    / HCO3: 29    / Base Excess: 5.6   /  SaO2: 94.6                RADIOLOGY & ADDITIONAL TESTS:    Consultant(s) Notes Reviewed:  [x ] YES  [ ] NO    MEDICATIONS  (STANDING):  aMIOdarone    Tablet 400 milliGRAM(s) Oral two times a day  aspirin  chewable 81 milliGRAM(s) Oral daily  chlorhexidine 2% Cloths 1 Application(s) Topical daily  clopidogrel Tablet 75 milliGRAM(s) Oral daily  enoxaparin Injectable 100 milliGRAM(s) SubCutaneous every 12 hours  glucagon  Injectable 1 milliGRAM(s) IntraMuscular once  guaifenesin/dextromethorphan Oral Liquid 10 milliLiter(s) Oral every 4 hours  insulin glargine Injectable (LANTUS) 27 Unit(s) SubCutaneous at bedtime  insulin lispro (ADMELOG) corrective regimen sliding scale   SubCutaneous three times a day before meals  insulin lispro Injectable (ADMELOG) 9 Unit(s) SubCutaneous three times a day before meals  losartan 25 milliGRAM(s) Oral daily  melatonin 5 milliGRAM(s) Oral at bedtime  metoprolol tartrate 12.5 milliGRAM(s) Oral every 6 hours  nicotine -  14 mG/24Hr(s) Patch 1 Patch Transdermal every 24 hours  pantoprazole  Injectable 40 milliGRAM(s) IV Push with breakfast  regadenoson Injectable 0.4 milliGRAM(s) IV Push once    MEDICATIONS  (PRN):  acetaminophen     Tablet .. 650 milliGRAM(s) Oral every 6 hours PRN Mild Pain (1 - 3)  benzocaine/menthol Lozenge 1 Lozenge Oral every 3 hours PRN Sore Throat      PHYSICAL EXAM:    GENERAL:  on the chair, comfortable, on 4L NC  NECK: Supple, No JVD, No HJR  CHEST/LUNG: B/L good air entry, crackles on the right, mildly on the left  HEART: Regular rate and rhythm; No murmurs  ABDOMEN: Soft, Nontender, Nondistended; Bowel sounds present  EXTREMITIES:  2+ Peripheral Pulses, 1-2+ pitting edema       Care Discussed with Consultants/Other Providers [ x] YES  [ ] NO Patient is a 44y old  Male who presents with a chief complaint of MI (02 Jan 2025 18:07)      INTERVAL HPI/OVERNIGHT EVENTS:     No overnight events. This morning patient says his breathing is doing a lot better. Otherwise the chest pain remains the same, worse when he coughs. ROS was otherwise negative. Patient HD stable though BP is soft, Afebrile satting well on 4L NC.    ICU Vital Signs Last 24 Hrs  T(C): 36.8 (03 Jan 2025 08:00), Max: 36.8 (03 Jan 2025 08:00)  T(F): 98.3 (03 Jan 2025 08:00), Max: 98.3 (03 Jan 2025 08:00)  HR: 67 (03 Jan 2025 11:00) (63 - 77)  BP: 91/55 (03 Jan 2025 11:00) (91/55 - 167/65)  BP(mean): 68 (03 Jan 2025 11:00) (67 - 94)  ABP: --  ABP(mean): --  RR: 26 (03 Jan 2025 11:00) (21 - 36)  SpO2: 94% (03 Jan 2025 11:00) (92% - 98%)    O2 Parameters below as of 03 Jan 2025 11:00  Patient On (Oxygen Delivery Method): nasal cannula  O2 Flow (L/min): 4        I&O's Summary    02 Jan 2025 07:01  -  03 Jan 2025 07:00  --------------------------------------------------------  IN: 1324 mL / OUT: 1825 mL / NET: -501 mL    03 Jan 2025 07:01  -  03 Jan 2025 11:44  --------------------------------------------------------  IN: 0 mL / OUT: 200 mL / NET: -200 mL          LABS:                        12.2   11.69 )-----------( 453      ( 03 Jan 2025 05:25 )             36.4     01-03    134[L]  |  97[L]  |  17  ----------------------------<  142[H]  4.3   |  24  |  0.8    Ca    7.8[L]      03 Jan 2025 05:25  Phos  4.3     01-03  Mg     2.1     01-03    TPro  5.9[L]  /  Alb  2.9[L]  /  TBili  <0.2  /  DBili  x   /  AST  25  /  ALT  24  /  AlkPhos  152[H]  01-03    PTT - ( 03 Jan 2025 05:25 )  PTT:85.7 sec  Urinalysis Basic - ( 03 Jan 2025 05:25 )    Color: x / Appearance: x / SG: x / pH: x  Gluc: 142 mg/dL / Ketone: x  / Bili: x / Urobili: x   Blood: x / Protein: x / Nitrite: x   Leuk Esterase: x / RBC: x / WBC x   Sq Epi: x / Non Sq Epi: x / Bacteria: x      CAPILLARY BLOOD GLUCOSE      POCT Blood Glucose.: 174 mg/dL (03 Jan 2025 11:30)  POCT Blood Glucose.: 136 mg/dL (03 Jan 2025 07:39)  POCT Blood Glucose.: 153 mg/dL (02 Jan 2025 21:42)  POCT Blood Glucose.: 164 mg/dL (02 Jan 2025 15:49)    ABG - ( 02 Jan 2025 04:35 )  pH, Arterial: 7.51  pH, Blood: x     /  pCO2: 36    /  pO2: 66    / HCO3: 29    / Base Excess: 5.6   /  SaO2: 94.6                RADIOLOGY & ADDITIONAL TESTS:    Consultant(s) Notes Reviewed:  [x ] YES  [ ] NO    MEDICATIONS  (STANDING):  aMIOdarone    Tablet 400 milliGRAM(s) Oral two times a day  aspirin  chewable 81 milliGRAM(s) Oral daily  chlorhexidine 2% Cloths 1 Application(s) Topical daily  clopidogrel Tablet 75 milliGRAM(s) Oral daily  enoxaparin Injectable 100 milliGRAM(s) SubCutaneous every 12 hours  glucagon  Injectable 1 milliGRAM(s) IntraMuscular once  guaifenesin/dextromethorphan Oral Liquid 10 milliLiter(s) Oral every 4 hours  insulin glargine Injectable (LANTUS) 27 Unit(s) SubCutaneous at bedtime  insulin lispro (ADMELOG) corrective regimen sliding scale   SubCutaneous three times a day before meals  insulin lispro Injectable (ADMELOG) 9 Unit(s) SubCutaneous three times a day before meals  losartan 25 milliGRAM(s) Oral daily  melatonin 5 milliGRAM(s) Oral at bedtime  metoprolol tartrate 12.5 milliGRAM(s) Oral every 6 hours  nicotine -  14 mG/24Hr(s) Patch 1 Patch Transdermal every 24 hours  pantoprazole  Injectable 40 milliGRAM(s) IV Push with breakfast  regadenoson Injectable 0.4 milliGRAM(s) IV Push once    MEDICATIONS  (PRN):  acetaminophen     Tablet .. 650 milliGRAM(s) Oral every 6 hours PRN Mild Pain (1 - 3)  benzocaine/menthol Lozenge 1 Lozenge Oral every 3 hours PRN Sore Throat      PHYSICAL EXAM:    GENERAL:  on the chair, comfortable, on 4L NC  NECK: Supple, No JVD, No HJR  CHEST/LUNG: B/L good air entry, crackles on the right, mildly on the left  HEART: Regular rate and rhythm; No murmurs  ABDOMEN: Soft, Nontender, Nondistended; Bowel sounds present  EXTREMITIES:  2+ Peripheral Pulses, 1-2+ pitting edema       Care Discussed with Consultants/Other Providers [ x] YES  [ ] NO

## 2025-01-03 NOTE — CHART NOTE - NSCHARTNOTEFT_GEN_A_CORE
CCU Downgrade Note  ---------------------------    Transfer from: CCU  Transfer to:  SDU      MICU COURSE    Patient is a 44y old  Male who presents with a chief complaint of MI (02 Jan 2025 18:07)      HPI:    INTERVAL HPI/OVERNIGHT EVENTS:  44M with HTN, IDDM, HLD, active smoker (1/2 PPD x 28 yrs), hepatitis B (s/p treatment) presents with CC of chest pressure and SOB. 2 weeks ago, he started having cough w/ yellow and white sputum runny nose, generalized body aches. Then started developing REGAN about a week ago along with left sided chest pressure, initially about 6-7/10. The chest pressure/SOB are worse with laying flat, improve with leaning forward. No nausea vomiting, reports loose BM 1-2 per day x 2 weeks. Reports burning at end of urination about a week ago which has resolved.  His last alcoholic drink was 3 weeks ago. Also uses Timothy Dust occasionally, last use few weeks ago. Denies any injected drugs or cocaine.  Has not taken his meds including insulin in the last 2 weeks due to feeling sick and not eating much.    Triage VS: /98, , RR 26, SpO2 92% on RA, T 98.6F.  Labs: WBC 11.77k, D-dimer 543, CRP 91, HS troponin 400, pro-BNP 4126.  UA w/ 100 protein, 500 glucose, otherwise negative.  SARS-CoV-2 positive.  CXR w/ bilateral opacities.    ED interventions: ASA 325mg, ticagrelor 180mg, DuoNeb, ketorolac, 125mg IV methylprednisolone, 80mg IV furosemide. Was also placed on BIPAP which he did not tolerate (made him very anxious), despite repeated counseling.    CCU course    Patient was admitted for late presentation of anterolateral MI. Patient was started on DAPT and heparin. Patient also had  Echo done on 12/27/24 showed EF 25-30% with LV thrombus. 12/28 patient developed Sustained Vtach and was cardioverted. started on amiodarone. Patient was initially planned for cath, however patient was orthopneic, with clinical signs of volume overload, Was treated with multiple doses of Lasix and bumex with good clinical improvement. 1/2 and 1/3 IVC were small and collapsible so diuretics were held . Dr. Heath Recommended a viability study which was done 1/2/25 and showed nonviable lesions.    Patient was also found to have covid with ARDS. Patient was dyspneic and was on HFNC 40L 40%. Now on 4L NC. patient clinically improving. CRP downtrending. Patient did not receive dexamethasone due to concern for mechanical complications post MI.         REVIEW OF SYSTEMS:  CONSTITUTIONAL: No fever, chills  RESPIRATORY: No cough,  shortness of breath doing better  CARDIOVASCULAR: No chest pain, palpitations  GASTROINTESTINAL: No abdominal pain. No nausea, vomiting, or diarrhea  GENITOURINARY: No dysuria    MEDICATIONS:  acetaminophen     Tablet .. 650 milliGRAM(s) Oral every 6 hours PRN  aMIOdarone    Tablet 400 milliGRAM(s) Oral two times a day  aspirin  chewable 81 milliGRAM(s) Oral daily  benzocaine/menthol Lozenge 1 Lozenge Oral every 3 hours PRN  chlorhexidine 2% Cloths 1 Application(s) Topical daily  clopidogrel Tablet 75 milliGRAM(s) Oral daily  enoxaparin Injectable 100 milliGRAM(s) SubCutaneous every 12 hours  glucagon  Injectable 1 milliGRAM(s) IntraMuscular once  guaifenesin/dextromethorphan Oral Liquid 10 milliLiter(s) Oral every 4 hours  insulin glargine Injectable (LANTUS) 27 Unit(s) SubCutaneous at bedtime  insulin lispro (ADMELOG) corrective regimen sliding scale   SubCutaneous three times a day before meals  insulin lispro Injectable (ADMELOG) 9 Unit(s) SubCutaneous three times a day before meals  losartan 25 milliGRAM(s) Oral daily  melatonin 5 milliGRAM(s) Oral at bedtime  metoprolol tartrate 12.5 milliGRAM(s) Oral every 6 hours  nicotine -  14 mG/24Hr(s) Patch 1 Patch Transdermal every 24 hours  pantoprazole  Injectable 40 milliGRAM(s) IV Push with breakfast  regadenoson Injectable 0.4 milliGRAM(s) IV Push once      T(C): 36.7 (01-03-25 @ 16:00), Max: 37 (01-03-25 @ 12:00)  HR: 69 (01-03-25 @ 16:00) (63 - 77)  BP: 96/51 (01-03-25 @ 16:00) (91/55 - 118/68)  RR: 34 (01-03-25 @ 16:00) (21 - 36)  SpO2: 92% (01-03-25 @ 16:00) (91% - 98%)  Wt(kg): --Vital Signs Last 24 Hrs  T(C): 36.7 (03 Jan 2025 16:00), Max: 37 (03 Jan 2025 12:00)  T(F): 98 (03 Jan 2025 16:00), Max: 98.6 (03 Jan 2025 12:00)  HR: 69 (03 Jan 2025 16:00) (63 - 77)  BP: 96/51 (03 Jan 2025 16:00) (91/55 - 118/68)  BP(mean): 67 (03 Jan 2025 16:00) (67 - 86)  RR: 34 (03 Jan 2025 16:00) (21 - 36)  SpO2: 92% (03 Jan 2025 16:00) (91% - 98%)    Parameters below as of 03 Jan 2025 16:00  Patient On (Oxygen Delivery Method): nasal cannula  O2 Flow (L/min): 4    PHYSICAL EXAM:    GENERAL:  on the chair, comfortable, on 4L NC  NECK: Supple, No JVD, No HJR  CHEST/LUNG: B/L good air entry, crackles on the right, mildly on the left  HEART: Regular rate and rhythm; No murmurs  ABDOMEN: Soft, Nontender, Nondistended; Bowel sounds present  EXTREMITIES:  2+ Peripheral Pulses, 1-2+ pitting edema         PLAN    #Late presentation anterior MI   #Acute HFrEF, ICM, EF 25-30%  #Sustained monomorphic slow Vtach; 12 min overnight on 12/28 with rate ~ 140s  #LV thrombus  #HTN  - s/p heparin gtt. Now on therapeutic lovenox. Requires long term anticoagulation for LV thrombus   - continue ASA 81 QD   - switch ticagrelor to Plavix as patient on triple therapy   -start losartan 25 mg daily, discontinue Isordil  - Metoprolol 12.5mg PO Q6  -s/p lidocaine   -c/w amiodarone 400 mg BID PO. today day 5  - 1/2/25 POCUS IVC < 2cm and collapsible. BP on the soft side. stop bumex.  bolus   - LHC if viability study shows viable myocardium. Viability study showed nonviable myocardium  - Needs AICD/ Life vest     #Acute hypoxic resp failure /mild ARDS COVID +ve  -CT chest with GGO and small right pleural effusion.   -xray 12-30: bilateral effusion with bilateral opacities. 1/2/25 x ray improved. 1/3/25 xray stable  - monitor off steroids as oxygen requirements are not worsening and had recent MI  - wean off o2 as tolerated.   - CRP trending down.    #HLD  #IDDM w/ hyperglycemia  -TSH 0.99  -A1c 10.8  -  - on lantus 27 and lispro of 9

## 2025-01-03 NOTE — PROGRESS NOTE ADULT - ASSESSMENT
45 YO M with PMHx of HTN, IDDM, HLD, active smoker (1/2 PPD x 28 yrs), hepatitis B (s/p treatment) presented with CC chest pressure and SOB.    TTE 12/27/24: EF 25-30%. Multiple RWMA. GIIDD. Small, fixed, anteroapical left ventricular thrombus. Right ventricular apical hypokinesis. Mild MR.     IMPRESSION  #Late presentation anterior MI   #Acute HFrEF, ICM, EF 25-30%  #Sustained monomorphic slow Vtach; 12 min overnight on 12/28 with rate ~ 140s  #Lv thrombus  #Acute hypoxic resp failure /mild ARDS COVID +ve  #HTN  #HLD  #IDDM w/ hyperglycemia  #Active smoker  #Alcohol and drug (Timothy dust) abuse         PLAN    CNS:   AOx3  No pain    PULMONARY:  CT chest with GGO and small right pleural effusion.   -xray 12-30: bilateral effusion with bilateral opacities. 1/225 x ray improved. 1/3/25 xray stable  - monitor off steroids as oxygen requirements are not worsening and had recent MI  - wean off o2 as tolerated.   - CRP trending down.      CARDIOVASCULAR:   - s/p heparin gtt. Now on therapeutic lovenox. Requires long term anticoagulation for LV thrombus   - continue ASA 81 QD   - switch ticagrelor to Plavix as patient on triple therapy   -start losartan 25 mg daily, discontinue Isordil  - Metoprolol 12.5mg PO Q6  -s/p lidocaine   -c/w amiodarone 400 mg BID PO. today day 5  - 1/2/25 POCUS IVC < 2cm and collapsible. BP on the soft side. stop bumex.  bolus   - LHC if viability study shows viable myocardium  - Needs AICD/ Life vest before DC    GI:   - Pantoprazole 40mg IV QD.    RENAL:  - Follow up lytes. Correct as needed.    INFECTIOUS DISEASE:  -COVID + with ARDS rpt d dimer, CRP ESR LDH  -No dexamethasone for now as patient is post MI  - Blood cultures and MRSA negative  - Monitor off abx.  -CRP trending down    HEMATOLOGICAL:    -LV Thrombus  - Heparin drip full AC   - monitor PTT. platelets    ENDOCRINE:    -TSH 0.99  -A1c 10.8  -  - on lantus 27 and lispro of 9    MUSCULOSKELETAL:   - AAT  -OOTC    Disposition: CCU

## 2025-01-04 LAB
ALBUMIN SERPL ELPH-MCNC: 2.6 G/DL — LOW (ref 3.5–5.2)
ALP SERPL-CCNC: 154 U/L — HIGH (ref 30–115)
ALT FLD-CCNC: 25 U/L — SIGNIFICANT CHANGE UP (ref 0–41)
ANION GAP SERPL CALC-SCNC: 12 MMOL/L — SIGNIFICANT CHANGE UP (ref 7–14)
APTT BLD: 38.2 SEC — SIGNIFICANT CHANGE UP (ref 27–39.2)
AST SERPL-CCNC: 25 U/L — SIGNIFICANT CHANGE UP (ref 0–41)
BASOPHILS # BLD AUTO: 0.07 K/UL — SIGNIFICANT CHANGE UP (ref 0–0.2)
BASOPHILS NFR BLD AUTO: 0.5 % — SIGNIFICANT CHANGE UP (ref 0–1)
BILIRUB SERPL-MCNC: 0.3 MG/DL — SIGNIFICANT CHANGE UP (ref 0.2–1.2)
BUN SERPL-MCNC: 13 MG/DL — SIGNIFICANT CHANGE UP (ref 10–20)
CALCIUM SERPL-MCNC: 8.3 MG/DL — LOW (ref 8.4–10.4)
CHLORIDE SERPL-SCNC: 99 MMOL/L — SIGNIFICANT CHANGE UP (ref 98–110)
CO2 SERPL-SCNC: 22 MMOL/L — SIGNIFICANT CHANGE UP (ref 17–32)
CREAT SERPL-MCNC: 0.7 MG/DL — SIGNIFICANT CHANGE UP (ref 0.7–1.5)
CRP SERPL-MCNC: 43.5 MG/L — HIGH
EGFR: 117 ML/MIN/1.73M2 — SIGNIFICANT CHANGE UP
EOSINOPHIL # BLD AUTO: 0.7 K/UL — SIGNIFICANT CHANGE UP (ref 0–0.7)
EOSINOPHIL NFR BLD AUTO: 5.2 % — SIGNIFICANT CHANGE UP (ref 0–8)
GLUCOSE BLDC GLUCOMTR-MCNC: 133 MG/DL — HIGH (ref 70–99)
GLUCOSE BLDC GLUCOMTR-MCNC: 167 MG/DL — HIGH (ref 70–99)
GLUCOSE BLDC GLUCOMTR-MCNC: 174 MG/DL — HIGH (ref 70–99)
GLUCOSE BLDC GLUCOMTR-MCNC: 175 MG/DL — HIGH (ref 70–99)
GLUCOSE SERPL-MCNC: 137 MG/DL — HIGH (ref 70–99)
HCT VFR BLD CALC: 36.5 % — LOW (ref 42–52)
HGB BLD-MCNC: 12.1 G/DL — LOW (ref 14–18)
IMM GRANULOCYTES NFR BLD AUTO: 1.8 % — HIGH (ref 0.1–0.3)
LYMPHOCYTES # BLD AUTO: 17.1 % — LOW (ref 20.5–51.1)
LYMPHOCYTES # BLD AUTO: 2.29 K/UL — SIGNIFICANT CHANGE UP (ref 1.2–3.4)
MAGNESIUM SERPL-MCNC: 2 MG/DL — SIGNIFICANT CHANGE UP (ref 1.8–2.4)
MCHC RBC-ENTMCNC: 30.9 PG — SIGNIFICANT CHANGE UP (ref 27–31)
MCHC RBC-ENTMCNC: 33.2 G/DL — SIGNIFICANT CHANGE UP (ref 32–37)
MCV RBC AUTO: 93.4 FL — SIGNIFICANT CHANGE UP (ref 80–94)
MONOCYTES # BLD AUTO: 0.98 K/UL — HIGH (ref 0.1–0.6)
MONOCYTES NFR BLD AUTO: 7.3 % — SIGNIFICANT CHANGE UP (ref 1.7–9.3)
NEUTROPHILS # BLD AUTO: 9.13 K/UL — HIGH (ref 1.4–6.5)
NEUTROPHILS NFR BLD AUTO: 68.1 % — SIGNIFICANT CHANGE UP (ref 42.2–75.2)
NRBC # BLD: 0 /100 WBCS — SIGNIFICANT CHANGE UP (ref 0–0)
PHOSPHATE SERPL-MCNC: 4.6 MG/DL — SIGNIFICANT CHANGE UP (ref 2.1–4.9)
PLATELET # BLD AUTO: 430 K/UL — HIGH (ref 130–400)
PMV BLD: 9.5 FL — SIGNIFICANT CHANGE UP (ref 7.4–10.4)
POTASSIUM SERPL-MCNC: 4.7 MMOL/L — SIGNIFICANT CHANGE UP (ref 3.5–5)
POTASSIUM SERPL-SCNC: 4.7 MMOL/L — SIGNIFICANT CHANGE UP (ref 3.5–5)
PROT SERPL-MCNC: 5.8 G/DL — LOW (ref 6–8)
RBC # BLD: 3.91 M/UL — LOW (ref 4.7–6.1)
RBC # FLD: 12.4 % — SIGNIFICANT CHANGE UP (ref 11.5–14.5)
SODIUM SERPL-SCNC: 133 MMOL/L — LOW (ref 135–146)
WBC # BLD: 13.41 K/UL — HIGH (ref 4.8–10.8)
WBC # FLD AUTO: 13.41 K/UL — HIGH (ref 4.8–10.8)

## 2025-01-04 PROCEDURE — 93010 ELECTROCARDIOGRAM REPORT: CPT

## 2025-01-04 PROCEDURE — 99233 SBSQ HOSP IP/OBS HIGH 50: CPT

## 2025-01-04 PROCEDURE — 99223 1ST HOSP IP/OBS HIGH 75: CPT

## 2025-01-04 PROCEDURE — 71045 X-RAY EXAM CHEST 1 VIEW: CPT | Mod: 26

## 2025-01-04 RX ADMIN — Medication 12.5 MILLIGRAM(S): at 00:10

## 2025-01-04 RX ADMIN — Medication 1: at 17:31

## 2025-01-04 RX ADMIN — AMIODARONE HYDROCHLORIDE 400 MILLIGRAM(S): 200 TABLET ORAL at 17:33

## 2025-01-04 RX ADMIN — ACETAMINOPHEN 650 MILLIGRAM(S): 80 SOLUTION/ DROPS ORAL at 21:30

## 2025-01-04 RX ADMIN — Medication 12.5 MILLIGRAM(S): at 11:21

## 2025-01-04 RX ADMIN — NICOTINE POLACRILEX 1 PATCH: 4 LOZENGE ORAL at 07:14

## 2025-01-04 RX ADMIN — NICOTINE POLACRILEX 1 PATCH: 4 LOZENGE ORAL at 06:19

## 2025-01-04 RX ADMIN — Medication 1: at 08:02

## 2025-01-04 RX ADMIN — Medication 9 UNIT(S): at 17:32

## 2025-01-04 RX ADMIN — AMIODARONE HYDROCHLORIDE 400 MILLIGRAM(S): 200 TABLET ORAL at 06:04

## 2025-01-04 RX ADMIN — NICOTINE POLACRILEX 1 PATCH: 4 LOZENGE ORAL at 00:14

## 2025-01-04 RX ADMIN — ACETAMINOPHEN 650 MILLIGRAM(S): 80 SOLUTION/ DROPS ORAL at 11:20

## 2025-01-04 RX ADMIN — ENOXAPARIN SODIUM 100 MILLIGRAM(S): 60 INJECTION INTRAVENOUS; SUBCUTANEOUS at 17:35

## 2025-01-04 RX ADMIN — Medication 9 UNIT(S): at 11:21

## 2025-01-04 RX ADMIN — PANTOPRAZOLE 40 MILLIGRAM(S): 40 TABLET, DELAYED RELEASE ORAL at 08:02

## 2025-01-04 RX ADMIN — LOSARTAN POTASSIUM 25 MILLIGRAM(S): 100 TABLET, FILM COATED ORAL at 06:04

## 2025-01-04 RX ADMIN — BENZOCAINE AND MENTHOL 1 LOZENGE: 15; 3.6 LOZENGE ORAL at 17:33

## 2025-01-04 RX ADMIN — Medication 12.5 MILLIGRAM(S): at 06:04

## 2025-01-04 RX ADMIN — INSULIN GLARGINE-YFGN 27 UNIT(S): 100 INJECTION, SOLUTION SUBCUTANEOUS at 21:30

## 2025-01-04 RX ADMIN — Medication 12.5 MILLIGRAM(S): at 17:33

## 2025-01-04 RX ADMIN — NICOTINE POLACRILEX 1 PATCH: 4 LOZENGE ORAL at 20:21

## 2025-01-04 RX ADMIN — CLOPIDOGREL BISULFATE 75 MILLIGRAM(S): 75 TABLET, FILM COATED ORAL at 11:21

## 2025-01-04 RX ADMIN — ENOXAPARIN SODIUM 100 MILLIGRAM(S): 60 INJECTION INTRAVENOUS; SUBCUTANEOUS at 06:04

## 2025-01-04 RX ADMIN — BENZOCAINE AND MENTHOL 1 LOZENGE: 15; 3.6 LOZENGE ORAL at 21:00

## 2025-01-04 RX ADMIN — Medication 9 UNIT(S): at 08:02

## 2025-01-04 RX ADMIN — ACETAMINOPHEN 650 MILLIGRAM(S): 80 SOLUTION/ DROPS ORAL at 22:30

## 2025-01-04 RX ADMIN — Medication 5 MILLIGRAM(S): at 21:30

## 2025-01-04 RX ADMIN — ACETAMINOPHEN 650 MILLIGRAM(S): 80 SOLUTION/ DROPS ORAL at 12:10

## 2025-01-04 NOTE — PROGRESS NOTE ADULT - ASSESSMENT
45 YO M with PMHx of HTN, IDDM, HLD, active smoker (1/2 PPD x 28 yrs), hepatitis B (s/p treatment) presented with CC chest pressure and SOB. Patient was admitted for late presentation of anterolateral MI.      #Late presentation anterior MI   #Acute HFrEF, ICM, EF 25-30%  #Sustained monomorphic slow Vtach; 12 min overnight on 12/28 with rate ~ 140s  #LV thrombus  #HTN  - s/p heparin gtt. Now on therapeutic lovenox. Requires long term anticoagulation for LV thrombus   - discontinued ASA 81 QD   - cw plavix 75mg qd, cw lovenox 100mg q12  - cw losartan 25 mg daily  - cw Metoprolol 12.5mg PO Q6  -s/p lidocaine   -c/w amiodarone 400 mg BID PO. today day 6  - 1/2/25 POCUS IVC < 2cm and collapsible. BP on the soft side. stop bumex.  bolus   - LHC if viability study shows viable myocardium. Viability study showed nonviable myocardium  - Consulted EP to determine needs of AICD/ Life vest     #Acute hypoxic resp failure /mild ARDS COVID +ve  -CT chest with GGO and small right pleural effusion.   -xray 12-30: bilateral effusion with bilateral opacities. 1/2/25 x ray improved. 1/3/25 xray stable  - monitor off steroids as oxygen requirements are not worsening and had recent MI  - wean off o2 as tolerated.   - CRP trending down.    #HLD  #IDDM w/ hyperglycemia  -TSH 0.99  -A1c 10.8  -  - on lantus 27 and lispro of 9.   45 YO M with PMHx of HTN, IDDM, HLD, active smoker (1/2 PPD x 28 yrs), hepatitis B (s/p treatment) presented with CC chest pressure and SOB. Patient was admitted for late presentation of anterolateral MI.      #Late presentation anterior MI   #Acute HFrEF, ICM, EF 25-30%  #Sustained monomorphic slow Vtach; 12 min overnight on 12/28 with rate ~ 140s  #LV thrombus  #HTN  - s/p heparin gtt. Now on therapeutic lovenox. Requires long term anticoagulation for LV thrombus   - discontinued ASA 81 QD   - cw plavix 75mg qd, cw lovenox 100mg q12  - cw losartan 25 mg daily  - cw Metoprolol 12.5mg PO Q6  -s/p lidocaine   -c/w amiodarone 400 mg BID PO. today day 6  - 1/2/25 POCUS IVC < 2cm and collapsible. BP on the soft side. stop bumex.  bolus   - LHC if viability study shows viable myocardium. Viability study showed nonviable myocardium  - Consulted EP for AICD/Life Vest    #Acute hypoxic resp failure /mild ARDS COVID +ve  -CT chest with GGO and small right pleural effusion.   -xray 12-30: bilateral effusion with bilateral opacities. 1/2/25 x ray improved. 1/3/25 xray stable  - monitor off steroids as oxygen requirements are not worsening and had recent MI  - wean off o2 as tolerated.   - CRP trending down.    #HLD  #IDDM w/ hyperglycemia  -TSH 0.99  -A1c 10.8 - DM control  -  - on lantus 27 and lispro of 9.

## 2025-01-04 NOTE — PROGRESS NOTE ADULT - SUBJECTIVE AND OBJECTIVE BOX
SUBJECTIVE/OVERNIGHT EVENTS  Today is hospital day 9d. This morning patient was seen and examined at bedside, resting comfortably in bed. No acute or major events overnight.    CODE STATUS: FULL    MEDICATIONS  STANDING MEDICATIONS  aMIOdarone    Tablet 400 milliGRAM(s) Oral two times a day  chlorhexidine 2% Cloths 1 Application(s) Topical daily  clopidogrel Tablet 75 milliGRAM(s) Oral daily  enoxaparin Injectable 100 milliGRAM(s) SubCutaneous every 12 hours  glucagon  Injectable 1 milliGRAM(s) IntraMuscular once  guaifenesin/dextromethorphan Oral Liquid 10 milliLiter(s) Oral every 4 hours  insulin glargine Injectable (LANTUS) 27 Unit(s) SubCutaneous at bedtime  insulin lispro (ADMELOG) corrective regimen sliding scale   SubCutaneous three times a day before meals  insulin lispro Injectable (ADMELOG) 9 Unit(s) SubCutaneous three times a day before meals  losartan 25 milliGRAM(s) Oral daily  melatonin 5 milliGRAM(s) Oral at bedtime  metoprolol tartrate 12.5 milliGRAM(s) Oral every 6 hours  nicotine -  14 mG/24Hr(s) Patch 1 Patch Transdermal every 24 hours  pantoprazole  Injectable 40 milliGRAM(s) IV Push with breakfast  regadenoson Injectable 0.4 milliGRAM(s) IV Push once    PRN MEDICATIONS  acetaminophen     Tablet .. 650 milliGRAM(s) Oral every 6 hours PRN  benzocaine/menthol Lozenge 1 Lozenge Oral every 3 hours PRN    VITALS  T(F): 98.5 (01-04-25 @ 11:11), Max: 98.5 (01-04-25 @ 11:11)  HR: 75 (01-04-25 @ 11:11) (65 - 75)  BP: 104/60 (01-04-25 @ 11:11) (88/53 - 113/73)  RR: 20 (01-04-25 @ 11:11) (20 - 34)  SpO2: 92% (01-04-25 @ 11:11) (92% - 97%)  POCT Blood Glucose.: 133 mg/dL (01-04-25 @ 11:15)  POCT Blood Glucose.: 167 mg/dL (01-04-25 @ 07:46)  POCT Blood Glucose.: 159 mg/dL (01-03-25 @ 21:45)  POCT Blood Glucose.: 186 mg/dL (01-03-25 @ 16:06)    PHYSICAL EXAM  GENERAL  ( x ) NAD, lying in bed comfortably     (  ) obtunded     (  ) lethargic     (  ) somnolent    HEAD  (x  ) Atraumatic     (  ) hematoma     (  ) laceration (specify location:       )     NECK  (x  ) Supple     (  ) neck stiffness     (  ) nuchal rigidity     (  )  no JVD     (  ) JVD present ( -- cm)    HEART  Rate -->  (x  ) normal rate    (  ) bradycardic    (  ) tachycardic  Rhythm -->  (  x) regular    (  ) regularly irregular    (  ) irregularly irregular  Murmurs -->  ( x ) normal s1/s2    (  ) systolic murmur    (  ) diastolic murmur    (  ) continuous murmur     (  ) S3 present    (  ) S4 present    LUNGS  (  )Unlabored respirations     (  ) tachypnea  (  ) B/L air entry     ( x ) decreased breath sounds in:  right lower long  (  ) no adventitious sound     (  ) crackles     (  ) wheezing      ( x ) rhonchi      (specify location:       )  (  ) chest wall tenderness (specify location:       )    ABDOMEN  ( x ) Soft     (  ) tense   |   ( x ) nondistended     (  ) distended   |   (  ) +BS     (  ) hypoactive bowel sounds     (  ) hyperactive bowel sounds  (  ) nontender     (  ) RUQ tenderness     (  ) RLQ tenderness     (  ) LLQ tenderness     (  ) epigastric tenderness     (  ) diffuse tenderness  (  ) Splenomegaly      (  ) Hepatomegaly      (  ) Jaundice     (  ) ecchymosis     EXTREMITIES  ( x ) trace bilateral edema    NERVOUS SYSTEM  ( x ) A&Ox3     (  ) confused     (  ) lethargic  CN II-XII:     (  ) Intact     (  ) focal deficits  (Specify:     )   Upper extremities:     (  ) strength X/5     (  ) focal deficit (specify:    )  Lower extremities:     (  ) strength  X/5    (  ) focal deficit (specify:    )    SKIN  ( x ) No rashes or lesions     (  ) maculopapular rash     (  ) pustules     (  ) vesicles     (  ) ulcer     (  ) ecchymosis     (specify location:     )    LABS             12.1   13.41 )-----------( 430      ( 01-04-25 @ 06:06 )             36.5     133  |  99  |  13  -------------------------<  137   01-04-25 @ 06:06  4.7  |  22  |  0.7    Ca      8.3     01-04-25 @ 06:06  Phos   4.6     01-04-25 @ 06:06  Mg     2.0     01-04-25 @ 06:06    TPro  5.8  /  Alb  2.6  /  TBili  0.3  /  DBili  x   /  AST  25  /  ALT  25  /  AlkPhos  154  /  GGT  x     01-04-25 @ 06:06    PTT - ( 01-04-25 @ 06:06 )  PTT:38.2 sec      Urinalysis Basic - ( 04 Jan 2025 06:06 )    Color: x / Appearance: x / SG: x / pH: x  Gluc: 137 mg/dL / Ketone: x  / Bili: x / Urobili: x   Blood: x / Protein: x / Nitrite: x   Leuk Esterase: x / RBC: x / WBC x   Sq Epi: x / Non Sq Epi: x / Bacteria: x      IMAGING SUBJECTIVE/OVERNIGHT EVENTS  Today is hospital day 9d. This morning patient was seen and examined at bedside, resting comfortably in bed. No acute or major events overnight.    CODE STATUS: FULL    MEDICATIONS:  STANDING MEDICATIONS  aMIOdarone    Tablet 400 milliGRAM(s) Oral two times a day  chlorhexidine 2% Cloths 1 Application(s) Topical daily  clopidogrel Tablet 75 milliGRAM(s) Oral daily  enoxaparin Injectable 100 milliGRAM(s) SubCutaneous every 12 hours  glucagon  Injectable 1 milliGRAM(s) IntraMuscular once  guaifenesin/dextromethorphan Oral Liquid 10 milliLiter(s) Oral every 4 hours  insulin glargine Injectable (LANTUS) 27 Unit(s) SubCutaneous at bedtime  insulin lispro (ADMELOG) corrective regimen sliding scale   SubCutaneous three times a day before meals  insulin lispro Injectable (ADMELOG) 9 Unit(s) SubCutaneous three times a day before meals  losartan 25 milliGRAM(s) Oral daily  melatonin 5 milliGRAM(s) Oral at bedtime  metoprolol tartrate 12.5 milliGRAM(s) Oral every 6 hours  nicotine -  14 mG/24Hr(s) Patch 1 Patch Transdermal every 24 hours  pantoprazole  Injectable 40 milliGRAM(s) IV Push with breakfast  regadenoson Injectable 0.4 milliGRAM(s) IV Push once    PRN MEDICATIONS  acetaminophen     Tablet .. 650 milliGRAM(s) Oral every 6 hours PRN  benzocaine/menthol Lozenge 1 Lozenge Oral every 3 hours PRN    VITALS  T(F): 98.5 (01-04-25 @ 11:11), Max: 98.5 (01-04-25 @ 11:11)  HR: 75 (01-04-25 @ 11:11) (65 - 75)  BP: 104/60 (01-04-25 @ 11:11) (88/53 - 113/73)  RR: 20 (01-04-25 @ 11:11) (20 - 34)  SpO2: 92% (01-04-25 @ 11:11) (92% - 97%)  POCT Blood Glucose.: 133 mg/dL (01-04-25 @ 11:15)  POCT Blood Glucose.: 167 mg/dL (01-04-25 @ 07:46)  POCT Blood Glucose.: 159 mg/dL (01-03-25 @ 21:45)  POCT Blood Glucose.: 186 mg/dL (01-03-25 @ 16:06)    PHYSICAL EXAM  GENERAL  ( x ) NAD, lying in bed comfortably     (  ) obtunded     (  ) lethargic     (  ) somnolent    HEAD  (x  ) Atraumatic     (  ) hematoma     (  ) laceration (specify location:       )     NECK  (x  ) Supple     (  ) neck stiffness     (  ) nuchal rigidity     (  )  no JVD     (  ) JVD present ( -- cm)    HEART  Rate -->  (x  ) normal rate    (  ) bradycardic    (  ) tachycardic  Rhythm -->  (  x) regular    (  ) regularly irregular    (  ) irregularly irregular  Murmurs -->  ( x ) normal s1/s2    (  ) systolic murmur    (  ) diastolic murmur    (  ) continuous murmur     (  ) S3 present    (  ) S4 present    LUNGS  (  )Unlabored respirations     (  ) tachypnea  (  ) B/L air entry     ( x ) decreased breath sounds in:  right lower long  (  ) no adventitious sound     (  ) crackles     (  ) wheezing      ( x ) rhonchi      (specify location:       )  (  ) chest wall tenderness (specify location:       )    ABDOMEN  ( x ) Soft     (  ) tense   |   ( x ) nondistended     (  ) distended   |   (  ) +BS     (  ) hypoactive bowel sounds     (  ) hyperactive bowel sounds  (  ) nontender     (  ) RUQ tenderness     (  ) RLQ tenderness     (  ) LLQ tenderness     (  ) epigastric tenderness     (  ) diffuse tenderness  (  ) Splenomegaly      (  ) Hepatomegaly      (  ) Jaundice     (  ) ecchymosis     EXTREMITIES  ( x ) trace bilateral edema    NERVOUS SYSTEM  ( x ) A&Ox3     (  ) confused     (  ) lethargic  CN II-XII:     (  ) Intact     (  ) focal deficits  (Specify:     )   Upper extremities:     (  ) strength X/5     (  ) focal deficit (specify:    )  Lower extremities:     (  ) strength  X/5    (  ) focal deficit (specify:    )    SKIN  ( x ) No rashes or lesions     (  ) maculopapular rash     (  ) pustules     (  ) vesicles     (  ) ulcer     (  ) ecchymosis     (specify location:     )    LABS             12.1   13.41 )-----------( 430      ( 01-04-25 @ 06:06 )             36.5     133  |  99  |  13  -------------------------<  137   01-04-25 @ 06:06  4.7  |  22  |  0.7    Ca      8.3     01-04-25 @ 06:06  Phos   4.6     01-04-25 @ 06:06  Mg     2.0     01-04-25 @ 06:06    TPro  5.8  /  Alb  2.6  /  TBili  0.3  /  DBili  x   /  AST  25  /  ALT  25  /  AlkPhos  154  /  GGT  x     01-04-25 @ 06:06    PTT - ( 01-04-25 @ 06:06 )  PTT:38.2 sec      Urinalysis Basic - ( 04 Jan 2025 06:06 )    Color: x / Appearance: x / SG: x / pH: x  Gluc: 137 mg/dL / Ketone: x  / Bili: x / Urobili: x   Blood: x / Protein: x / Nitrite: x   Leuk Esterase: x / RBC: x / WBC x   Sq Epi: x / Non Sq Epi: x / Bacteria: x      IMAGING

## 2025-01-04 NOTE — CONSULT NOTE ADULT - SUBJECTIVE AND OBJECTIVE BOX
Patient is a 44y old  Male who presents with a chief complaint of MI (04 Jan 2025 12:40)    HPI: Patient is a 44M with HTN, IDDM, HLD, active smoker (1/2 PPD x 28 yrs), hepatitis B (s/p treatment) presents with CC of chest pressure and SOB. 2 weeks ago, he started having cough w/ yellow and white sputum runny nose, generalized body aches. Then started developing REGAN about a week ago along with left sided chest pressure, initially about 6-7/10. The chest pressure/SOB are worse with laying flat, improve with leaning forward. No nausea vomiting, reports loose BM 1-2 per day x 2 weeks. Reports burning at end of urination about a week ago which has resolved. His last alcoholic drink was 3 weeks ago. Also uses Timothy Dust occasionally, last use few weeks ago. Denies any injected drugs or cocaine. Has not taken his meds including insulin in the last 2 weeks due to feeling sick and not eating much. Patient was admitted for late presentation of anterolateral MI. Patient was started on DAPT and heparin. Patient also had  Echo done on 12/27/24 showed EF 25-30% with LV thrombus. 12/28 patient developed Sustained Vtach and was cardioverted. started on amiodarone. Patient was initially planned for cath, however patient was orthopneic, with clinical signs of volume overload, Was treated with multiple doses of Lasix and bumex with good clinical improvement. 1/2 and 1/3 IVC were small and collapsible so diuretics were held . Dr. Heath Recommended a viability study which was done 1/2/25 and showed nonviable lesions. Patient was also found to have covid with ARDS. Patient was dyspneic and was on HFNC 40L 40%. Now on 4L NC. patient clinically improving. CRP downtrending. Patient did not receive dexamethasone due to concern for mechanical complications post MI.       PAST MEDICAL & SURGICAL HISTORY:  HTN (hypertension)      Hepatitis B      DM (diabetes mellitus)                      PREVIOUS DIAGNOSTIC TESTING:      ECHO  FINDINGS:  < from: TTE Echo Complete w/ Contrast w/o Doppler (12.27.24 @ 11:20) >  Summary:   1. Endocardial visualization was enhanced with intravenous echo contrast.   2. Left ventricular ejection fraction, by visual estimation, is 25 to   30%.  3. Severely decreased global left ventricular systolic function.   4. Multiple left ventricular regional wall motion abnormalities exist.   See wall motion findings.   5. Spectral Doppler shows pseudonormal pattern of left ventricular   myocardial filling (Grade II diastolic dysfunction).   6. Small, fixed, anteroapical left ventricular thrombus.   7. Right ventricular apical hypokinesis.   8. Mild mitral regurgitation.   9. Findings discussed with CCU team.    < end of copied text >    STRESS  FINDINGS:    CATHETERIZATION  FINDINGS:    ELECTROPHYSIOLOGY STUDY  FINDINGS:    CAROTID ULTRASOUND:  FINDINGS    VENOUS DUPLEX SCAN:  FINDINGS:    CHEST CT PULMONARY ANGIO with IV Contrast:  FINDINGS:    MEDICATIONS  (STANDING):  aMIOdarone    Tablet 400 milliGRAM(s) Oral two times a day  chlorhexidine 2% Cloths 1 Application(s) Topical daily  clopidogrel Tablet 75 milliGRAM(s) Oral daily  enoxaparin Injectable 100 milliGRAM(s) SubCutaneous every 12 hours  glucagon  Injectable 1 milliGRAM(s) IntraMuscular once  guaifenesin/dextromethorphan Oral Liquid 10 milliLiter(s) Oral every 4 hours  insulin glargine Injectable (LANTUS) 27 Unit(s) SubCutaneous at bedtime  insulin lispro (ADMELOG) corrective regimen sliding scale   SubCutaneous three times a day before meals  insulin lispro Injectable (ADMELOG) 9 Unit(s) SubCutaneous three times a day before meals  losartan 25 milliGRAM(s) Oral daily  melatonin 5 milliGRAM(s) Oral at bedtime  metoprolol tartrate 12.5 milliGRAM(s) Oral every 6 hours  nicotine -  14 mG/24Hr(s) Patch 1 Patch Transdermal every 24 hours  pantoprazole  Injectable 40 milliGRAM(s) IV Push with breakfast  regadenoson Injectable 0.4 milliGRAM(s) IV Push once    MEDICATIONS  (PRN):  acetaminophen     Tablet .. 650 milliGRAM(s) Oral every 6 hours PRN Mild Pain (1 - 3)  benzocaine/menthol Lozenge 1 Lozenge Oral every 3 hours PRN Sore Throat      FAMILY HISTORY: No significant hx    SOCIAL HISTORY: Hx smoking, ETOH and drug use    Past Surgical History: see above    Allergies:    Seafood (Urticaria)  No Known Drug Allergies  shellfish (Urticaria; Rash; Short breath)      REVIEW OF SYSTEMS:  CONSTITUTIONAL: No fever, weight loss, chills, shakes, or fatigue  RESPIRATORY: No cough, wheezing, hemoptysis, or shortness of breath  CARDIOVASCULAR: + chest pain, dyspnea, No palpitations, dizziness, syncope, paroxysmal nocturnal dyspnea, orthopnea, or arm or leg swelling  GASTROINTESTINAL: No abdominal  or epigastric pain, nausea, vomiting, hematemesis, diarrhea, constipation, melena or bright red blood.  NEUROLOGICAL: No headaches, memory loss, slurred speech, limb weakness, loss of strength, numbness, or tremors  MUSCULOSKELETAL: No joint pain or swelling, muscle, back, or extremity pain      Vital Signs Last 24 Hrs  T(C): 36.9 (04 Jan 2025 11:11), Max: 36.9 (04 Jan 2025 11:11)  T(F): 98.5 (04 Jan 2025 11:11), Max: 98.5 (04 Jan 2025 11:11)  HR: 75 (04 Jan 2025 11:11) (65 - 75)  BP: 104/60 (04 Jan 2025 11:11) (88/53 - 113/73)  BP(mean): 76 (04 Jan 2025 11:11) (66 - 89)  RR: 20 (04 Jan 2025 11:11) (20 - 34)  SpO2: 92% (04 Jan 2025 11:11) (92% - 97%)    Parameters below as of 04 Jan 2025 11:11  Patient On (Oxygen Delivery Method): nasal cannula  O2 Flow (L/min): 3      PHYSICAL EXAM:  GENERAL: In no apparent distress, well nourished, and hydrated.  NECK: Supple, No JVD   HEART: Regular rate and rhythm; No murmurs, rubs, or gallops.  PULMONARY: Clear to auscultation and perfusion.  No rales, wheezing, or rhonchi bilaterally.  EXTREMITIES:  2+ Peripheral Pulses, no LE edema BL  NEUROLOGICAL: Grossly nonfocal      INTERPRETATION OF TELEMETRY: SR 75 bpm    ECG:  < from: 12 Lead ECG (01.04.25 @ 09:31) >  Ventricular Rate 71 BPM    Atrial Rate 71 BPM    P-R Interval 192 ms    QRS Duration 106 ms    Q-T Interval 504 ms    QTC Calculation(Bazett) 547 ms    P Axis 35 degrees    R Axis 92 degrees    T Axis 84 degrees    Diagnosis Line Normal sinus rhythm  Anterolateral infarct , age undetermined  Prolonged QT  Abnormal ECG    Confirmed by Peng Heath ([id number]) on 1/4/2025 10:13:18 AM    < end of copied text >      I&O's Detail    03 Jan 2025 07:01  -  04 Jan 2025 07:00  --------------------------------------------------------  IN:    Oral Fluid: 1080 mL  Total IN: 1080 mL    OUT:    Voided (mL): 2100 mL  Total OUT: 2100 mL    Total NET: -1020 mL      04 Jan 2025 07:01  -  04 Jan 2025 14:20  --------------------------------------------------------  IN:    Oral Fluid: 240 mL  Total IN: 240 mL    OUT:    Voided (mL): 570 mL  Total OUT: 570 mL    Total NET: -330 mL          LABS:                        12.1   13.41 )-----------( 430      ( 04 Jan 2025 06:06 )             36.5     01-04    133[L]  |  99  |  13  ----------------------------<  137[H]  4.7   |  22  |  0.7    Ca    8.3[L]      04 Jan 2025 06:06  Phos  4.6     01-04  Mg     2.0     01-04    TPro  5.8[L]  /  Alb  2.6[L]  /  TBili  0.3  /  DBili  x   /  AST  25  /  ALT  25  /  AlkPhos  154[H]  01-04        PTT - ( 04 Jan 2025 06:06 )  PTT:38.2 sec  Urinalysis Basic - ( 04 Jan 2025 06:06 )    Color: x / Appearance: x / SG: x / pH: x  Gluc: 137 mg/dL / Ketone: x  / Bili: x / Urobili: x   Blood: x / Protein: x / Nitrite: x   Leuk Esterase: x / RBC: x / WBC x   Sq Epi: x / Non Sq Epi: x / Bacteria: x      BNP  I&O's Detail    03 Jan 2025 07:01  -  04 Jan 2025 07:00  --------------------------------------------------------  IN:    Oral Fluid: 1080 mL  Total IN: 1080 mL    OUT:    Voided (mL): 2100 mL  Total OUT: 2100 mL    Total NET: -1020 mL      04 Jan 2025 07:01  -  04 Jan 2025 14:20  --------------------------------------------------------  IN:    Oral Fluid: 240 mL  Total IN: 240 mL    OUT:    Voided (mL): 570 mL  Total OUT: 570 mL    Total NET: -330 mL        Daily     Daily     RADIOLOGY & ADDITIONAL STUDIES:

## 2025-01-04 NOTE — CONSULT NOTE ADULT - NS ATTEND AMEND GEN_ALL_CORE FT
Ischemic cardiomyopathy  Sustained VT  COVID +    Cont Tele  COVID management as per ID/Primary team  ? Need for LHC- cardio to decide. F-up  Considering sustained VT, patient will benefit from ICD  Will need ID clearance from COVID standpoint of view  GDMT as per primary team  Cont BB. No AADs for now (slow VT)

## 2025-01-04 NOTE — CONSULT NOTE ADULT - ASSESSMENT
45 YO M with PMHx of HTN, IDDM, HLD, active smoker (1/2 PPD x 28 yrs), hepatitis B (s/p treatment) presented with CC chest pressure and SOB. Patient was admitted for late presentation of anterolateral MI.    Impression:  Sustained VT  Ischemic CM EF 25-30%  Anterior MI  COVID (+)/ARDS  HTN  HLD  DM  Hep B    Plan:  - Pt will benefit from AICD implant for secondary prevention  - Device to be placed after complete cardiology workup and when pt is off isolation from COVID  - Cont GDMT  - Cont tele monitoring  - Monitor electrolytes, maintain WNL  - Will follow

## 2025-01-05 LAB
ALBUMIN SERPL ELPH-MCNC: 2.9 G/DL — LOW (ref 3.5–5.2)
ALP SERPL-CCNC: 168 U/L — HIGH (ref 30–115)
ALT FLD-CCNC: 24 U/L — SIGNIFICANT CHANGE UP (ref 0–41)
ANION GAP SERPL CALC-SCNC: 15 MMOL/L — HIGH (ref 7–14)
AST SERPL-CCNC: 27 U/L — SIGNIFICANT CHANGE UP (ref 0–41)
BASOPHILS # BLD AUTO: 0.06 K/UL — SIGNIFICANT CHANGE UP (ref 0–0.2)
BASOPHILS NFR BLD AUTO: 0.4 % — SIGNIFICANT CHANGE UP (ref 0–1)
BILIRUB SERPL-MCNC: 0.3 MG/DL — SIGNIFICANT CHANGE UP (ref 0.2–1.2)
BUN SERPL-MCNC: 17 MG/DL — SIGNIFICANT CHANGE UP (ref 10–20)
CALCIUM SERPL-MCNC: 8 MG/DL — LOW (ref 8.4–10.5)
CHLORIDE SERPL-SCNC: 99 MMOL/L — SIGNIFICANT CHANGE UP (ref 98–110)
CO2 SERPL-SCNC: 19 MMOL/L — SIGNIFICANT CHANGE UP (ref 17–32)
CREAT SERPL-MCNC: 0.8 MG/DL — SIGNIFICANT CHANGE UP (ref 0.7–1.5)
EGFR: 112 ML/MIN/1.73M2 — SIGNIFICANT CHANGE UP
EOSINOPHIL # BLD AUTO: 0.63 K/UL — SIGNIFICANT CHANGE UP (ref 0–0.7)
EOSINOPHIL NFR BLD AUTO: 4.3 % — SIGNIFICANT CHANGE UP (ref 0–8)
GLUCOSE BLDC GLUCOMTR-MCNC: 157 MG/DL — HIGH (ref 70–99)
GLUCOSE BLDC GLUCOMTR-MCNC: 158 MG/DL — HIGH (ref 70–99)
GLUCOSE BLDC GLUCOMTR-MCNC: 192 MG/DL — HIGH (ref 70–99)
GLUCOSE BLDC GLUCOMTR-MCNC: 202 MG/DL — HIGH (ref 70–99)
GLUCOSE SERPL-MCNC: 188 MG/DL — HIGH (ref 70–99)
HCT VFR BLD CALC: 37.5 % — LOW (ref 42–52)
HGB BLD-MCNC: 12.4 G/DL — LOW (ref 14–18)
IMM GRANULOCYTES NFR BLD AUTO: 1.6 % — HIGH (ref 0.1–0.3)
LYMPHOCYTES # BLD AUTO: 15.1 % — LOW (ref 20.5–51.1)
LYMPHOCYTES # BLD AUTO: 2.21 K/UL — SIGNIFICANT CHANGE UP (ref 1.2–3.4)
MAGNESIUM SERPL-MCNC: 2 MG/DL — SIGNIFICANT CHANGE UP (ref 1.8–2.4)
MCHC RBC-ENTMCNC: 30.9 PG — SIGNIFICANT CHANGE UP (ref 27–31)
MCHC RBC-ENTMCNC: 33.1 G/DL — SIGNIFICANT CHANGE UP (ref 32–37)
MCV RBC AUTO: 93.5 FL — SIGNIFICANT CHANGE UP (ref 80–94)
MONOCYTES # BLD AUTO: 1.02 K/UL — HIGH (ref 0.1–0.6)
MONOCYTES NFR BLD AUTO: 7 % — SIGNIFICANT CHANGE UP (ref 1.7–9.3)
NEUTROPHILS # BLD AUTO: 10.51 K/UL — HIGH (ref 1.4–6.5)
NEUTROPHILS NFR BLD AUTO: 71.6 % — SIGNIFICANT CHANGE UP (ref 42.2–75.2)
NRBC # BLD: 0 /100 WBCS — SIGNIFICANT CHANGE UP (ref 0–0)
PHOSPHATE SERPL-MCNC: 4.1 MG/DL — SIGNIFICANT CHANGE UP (ref 2.1–4.9)
PLATELET # BLD AUTO: 412 K/UL — HIGH (ref 130–400)
PMV BLD: 9.4 FL — SIGNIFICANT CHANGE UP (ref 7.4–10.4)
POTASSIUM SERPL-MCNC: 5 MMOL/L — SIGNIFICANT CHANGE UP (ref 3.5–5)
POTASSIUM SERPL-SCNC: 5 MMOL/L — SIGNIFICANT CHANGE UP (ref 3.5–5)
PROT SERPL-MCNC: 6.2 G/DL — SIGNIFICANT CHANGE UP (ref 6–8)
RBC # BLD: 4.01 M/UL — LOW (ref 4.7–6.1)
RBC # FLD: 12.4 % — SIGNIFICANT CHANGE UP (ref 11.5–14.5)
SODIUM SERPL-SCNC: 133 MMOL/L — LOW (ref 135–146)
WBC # BLD: 14.67 K/UL — HIGH (ref 4.8–10.8)
WBC # FLD AUTO: 14.67 K/UL — HIGH (ref 4.8–10.8)

## 2025-01-05 PROCEDURE — 99233 SBSQ HOSP IP/OBS HIGH 50: CPT

## 2025-01-05 PROCEDURE — 93010 ELECTROCARDIOGRAM REPORT: CPT

## 2025-01-05 PROCEDURE — 71045 X-RAY EXAM CHEST 1 VIEW: CPT | Mod: 26

## 2025-01-05 RX ORDER — BENZONATATE 100 MG
100 CAPSULE ORAL EVERY 8 HOURS
Refills: 0 | Status: DISCONTINUED | OUTPATIENT
Start: 2025-01-05 | End: 2025-01-16

## 2025-01-05 RX ORDER — IPRATROPIUM BROMIDE AND ALBUTEROL SULFATE .5; 2.5 MG/3ML; MG/3ML
3 SOLUTION RESPIRATORY (INHALATION) ONCE
Refills: 0 | Status: COMPLETED | OUTPATIENT
Start: 2025-01-05 | End: 2025-01-05

## 2025-01-05 RX ORDER — IPRATROPIUM BROMIDE AND ALBUTEROL SULFATE .5; 2.5 MG/3ML; MG/3ML
3 SOLUTION RESPIRATORY (INHALATION) EVERY 6 HOURS
Refills: 0 | Status: DISCONTINUED | OUTPATIENT
Start: 2025-01-05 | End: 2025-01-16

## 2025-01-05 RX ADMIN — CLOPIDOGREL BISULFATE 75 MILLIGRAM(S): 75 TABLET, FILM COATED ORAL at 11:16

## 2025-01-05 RX ADMIN — Medication 1: at 17:06

## 2025-01-05 RX ADMIN — Medication 12.5 MILLIGRAM(S): at 11:16

## 2025-01-05 RX ADMIN — BENZOCAINE AND MENTHOL 1 LOZENGE: 15; 3.6 LOZENGE ORAL at 17:05

## 2025-01-05 RX ADMIN — LOSARTAN POTASSIUM 25 MILLIGRAM(S): 100 TABLET, FILM COATED ORAL at 05:35

## 2025-01-05 RX ADMIN — Medication 1: at 07:50

## 2025-01-05 RX ADMIN — Medication 5 MILLIGRAM(S): at 21:18

## 2025-01-05 RX ADMIN — NICOTINE POLACRILEX 1 PATCH: 4 LOZENGE ORAL at 05:37

## 2025-01-05 RX ADMIN — ACETAMINOPHEN 650 MILLIGRAM(S): 80 SOLUTION/ DROPS ORAL at 18:00

## 2025-01-05 RX ADMIN — Medication 2: at 11:15

## 2025-01-05 RX ADMIN — ACETAMINOPHEN 650 MILLIGRAM(S): 80 SOLUTION/ DROPS ORAL at 04:42

## 2025-01-05 RX ADMIN — Medication 9 UNIT(S): at 07:50

## 2025-01-05 RX ADMIN — AMIODARONE HYDROCHLORIDE 400 MILLIGRAM(S): 200 TABLET ORAL at 05:35

## 2025-01-05 RX ADMIN — ACETAMINOPHEN 650 MILLIGRAM(S): 80 SOLUTION/ DROPS ORAL at 17:03

## 2025-01-05 RX ADMIN — Medication 12.5 MILLIGRAM(S): at 17:04

## 2025-01-05 RX ADMIN — ENOXAPARIN SODIUM 100 MILLIGRAM(S): 60 INJECTION INTRAVENOUS; SUBCUTANEOUS at 05:37

## 2025-01-05 RX ADMIN — Medication 12.5 MILLIGRAM(S): at 00:10

## 2025-01-05 RX ADMIN — Medication 100 MILLIGRAM(S): at 12:16

## 2025-01-05 RX ADMIN — ENOXAPARIN SODIUM 100 MILLIGRAM(S): 60 INJECTION INTRAVENOUS; SUBCUTANEOUS at 17:03

## 2025-01-05 RX ADMIN — Medication 9 UNIT(S): at 17:06

## 2025-01-05 RX ADMIN — CHLORHEXIDINE GLUCONATE 1 APPLICATION(S): 1.2 RINSE ORAL at 05:46

## 2025-01-05 RX ADMIN — BENZOCAINE AND MENTHOL 1 LOZENGE: 15; 3.6 LOZENGE ORAL at 22:40

## 2025-01-05 RX ADMIN — IPRATROPIUM BROMIDE AND ALBUTEROL SULFATE 3 MILLILITER(S): .5; 2.5 SOLUTION RESPIRATORY (INHALATION) at 12:16

## 2025-01-05 RX ADMIN — BENZOCAINE AND MENTHOL 1 LOZENGE: 15; 3.6 LOZENGE ORAL at 00:09

## 2025-01-05 RX ADMIN — NICOTINE POLACRILEX 1 PATCH: 4 LOZENGE ORAL at 07:49

## 2025-01-05 RX ADMIN — NICOTINE POLACRILEX 1 PATCH: 4 LOZENGE ORAL at 19:30

## 2025-01-05 RX ADMIN — BENZOCAINE AND MENTHOL 1 LOZENGE: 15; 3.6 LOZENGE ORAL at 04:43

## 2025-01-05 RX ADMIN — Medication 100 MILLIGRAM(S): at 21:18

## 2025-01-05 RX ADMIN — ACETAMINOPHEN 650 MILLIGRAM(S): 80 SOLUTION/ DROPS ORAL at 11:16

## 2025-01-05 RX ADMIN — Medication 9 UNIT(S): at 11:15

## 2025-01-05 RX ADMIN — INSULIN GLARGINE-YFGN 27 UNIT(S): 100 INJECTION, SOLUTION SUBCUTANEOUS at 21:19

## 2025-01-05 RX ADMIN — ACETAMINOPHEN 650 MILLIGRAM(S): 80 SOLUTION/ DROPS ORAL at 12:12

## 2025-01-05 RX ADMIN — IPRATROPIUM BROMIDE AND ALBUTEROL SULFATE 3 MILLILITER(S): .5; 2.5 SOLUTION RESPIRATORY (INHALATION) at 17:03

## 2025-01-05 RX ADMIN — ACETAMINOPHEN 650 MILLIGRAM(S): 80 SOLUTION/ DROPS ORAL at 05:42

## 2025-01-05 RX ADMIN — Medication 12.5 MILLIGRAM(S): at 05:36

## 2025-01-05 RX ADMIN — AMIODARONE HYDROCHLORIDE 400 MILLIGRAM(S): 200 TABLET ORAL at 17:05

## 2025-01-05 RX ADMIN — PANTOPRAZOLE 40 MILLIGRAM(S): 40 TABLET, DELAYED RELEASE ORAL at 07:49

## 2025-01-05 NOTE — PROGRESS NOTE ADULT - ASSESSMENT
45 YO M with PMHx of HTN, IDDM, HLD, active smoker (1/2 PPD x 28 yrs), hepatitis B (s/p treatment) presented with CC chest pressure and SOB. Patient was admitted for late presentation of anterolateral MI.      #Late presentation anterior MI   #Acute HFrEF, ICM, EF 25-30%  #Sustained monomorphic slow Vtach; 12 min overnight on 12/28 with rate ~ 140s  #LV thrombus  #HTN  - s/p heparin gtt. Now on therapeutic lovenox. Requires long term anticoagulation for LV thrombus   - discontinued ASA 81 QD   - cw plavix 75mg qd, cw lovenox 100mg q12  - cw losartan 25 mg daily  - cw Metoprolol 12.5mg PO Q6  -s/p lidocaine   -c/w amiodarone 400 mg BID PO. today day 6  - Viability study showed nonviable myocardium  - EP following for VT    #Acute hypoxic resp failure /mild ARDS COVID +ve  -CT chest with GGO and small right pleural effusion.   -xray 12-30: bilateral effusion with bilateral opacities. 1/2/25 x ray improved. 1/3/25 xray stable  - monitor off steroids as oxygen requirements are not worsening and had recent MI  - wean off o2 as tolerated.   - CRP trending down.    #HLD  #IDDM w/ hyperglycemia  -TSH 0.99  -A1c 10.8 - DM control  -  - on lantus 27 and lispro of 9.    DVT PPX: Lovenox  Diet: DASH CC with fluid restriction  DISPO: Cardiac SDU   45 YO M with PMHx of HTN, IDDM, HLD, active smoker (1/2 PPD x 28 yrs), hepatitis B (s/p treatment) presented with CC chest pressure and SOB. Patient was admitted for late presentation of anterolateral MI.        #Late presentation anterior MI   #Acute HFrEF, ICM, EF 25-30%  #Sustained monomorphic slow Vtach; 12 min overnight on 12/28 with rate ~ 140s  #LV thrombus  #HTN  - s/p heparin gtt. Now on therapeutic lovenox. Requires long term anticoagulation for LV thrombus   - discontinued ASA 81 QD   - cw plavix 75mg qd, cw lovenox 100mg q12  - cw losartan 25 mg daily  - cw Metoprolol 12.5mg PO Q6  -s/p lidocaine   -c/w amiodarone 400 mg BID PO. today day 6  - Viability study showed nonviable myocardium  - EP following for VT    #Acute hypoxic resp failure /mild ARDS COVID +ve  -CT chest with GGO and small right pleural effusion.   -xray 12-30: bilateral effusion with bilateral opacities. 1/2/25 x ray improved. 1/3/25 xray stable  - monitor off steroids as oxygen requirements are not worsening and had recent MI  - wean off o2 as tolerated.   - CRP trending down.    #HLD  #IDDM w/ hyperglycemia  -TSH 0.99  -A1c 10.8 - DM control  -  - on lantus 27 and lispro of 9.    DVT PPX: Lovenox  Diet: DASH CC with fluid restriction  DISPO: Cardiac SDU

## 2025-01-05 NOTE — PROGRESS NOTE ADULT - SUBJECTIVE AND OBJECTIVE BOX
CHIEF COMPLAINT:  Patient is a 44y old  Male who presents with a chief complaint of MI (04 Jan 2025 12:40)      INTERVAL HISTORY/OVERNIGHT EVENTS:  No ON events    ======================  MEDICATIONS:  aMIOdarone    Tablet 400 milliGRAM(s) Oral two times a day  chlorhexidine 2% Cloths 1 Application(s) Topical daily  clopidogrel Tablet 75 milliGRAM(s) Oral daily  enoxaparin Injectable 100 milliGRAM(s) SubCutaneous every 12 hours  glucagon  Injectable 1 milliGRAM(s) IntraMuscular once  guaifenesin/dextromethorphan Oral Liquid 10 milliLiter(s) Oral every 4 hours  insulin glargine Injectable (LANTUS) 27 Unit(s) SubCutaneous at bedtime  insulin lispro (ADMELOG) corrective regimen sliding scale   SubCutaneous three times a day before meals  insulin lispro Injectable (ADMELOG) 9 Unit(s) SubCutaneous three times a day before meals  losartan 25 milliGRAM(s) Oral daily  melatonin 5 milliGRAM(s) Oral at bedtime  metoprolol tartrate 12.5 milliGRAM(s) Oral every 6 hours  nicotine -  14 mG/24Hr(s) Patch 1 Patch Transdermal every 24 hours  pantoprazole  Injectable 40 milliGRAM(s) IV Push with breakfast  regadenoson Injectable 0.4 milliGRAM(s) IV Push once    DRIPS:    PRN:       ======================  PHYSICAL EXAMINATION:  GEN:  nad.   HEENT:  eomi. ncat  PULM:  b/l crackles  CARD: s1, s2  ABD: +bs. ntnd  EXT:  no new rashes.    NEURO:  no new focal deficits.   ======================  OBJECTIVE:        VS:  T(F): 98.3 (01-05 @ 07:45), Max: 98.5 (01-04 @ 11:11)  HR: 69 (01-05 @ 07:45) (66 - 82)  BP: 117/76 (01-05 @ 07:45) (103/59 - 129/74)  RR: 31 (01-05 @ 07:45) (20 - 31)  SpO2: 93% (01-05 @ 07:45) (92% - 95%)  CVP(mm Hg): --  CO: --  CI: --  PA: --  PCWP: --    I/O:      01-02 @ 07:01  -  01-03 @ 07:00  --------------------------------------------------------  IN: 1324 mL / OUT: 1825 mL / NET: -501 mL    01-03 @ 07:01  -  01-04 @ 07:00  --------------------------------------------------------  IN: 1080 mL / OUT: 2100 mL / NET: -1020 mL    01-04 @ 07:01  -  01-05 @ 07:00  --------------------------------------------------------  IN: 1060 mL / OUT: 2045 mL / NET: -985 mL        Weight trend:      ======================    LABS:                          12.4   14.67 )-----------( 412      ( 05 Jan 2025 05:45 )             37.5     01-05    133[L]  |  99  |  17  ----------------------------<  188[H]  5.0   |  19  |  0.8    Ca    8.0[L]      05 Jan 2025 05:45  Phos  4.1     01-05  Mg     2.0     01-05    TPro  6.2  /  Alb  2.9[L]  /  TBili  0.3  /  DBili  x   /  AST  27  /  ALT  24  /  AlkPhos  168[H]  01-05    LIVER FUNCTIONS - ( 05 Jan 2025 05:45 )  Alb: 2.9 g/dL / Pro: 6.2 g/dL / ALK PHOS: 168 U/L / ALT: 24 U/L / AST: 27 U/L / GGT: x           PTT - ( 04 Jan 2025 06:06 )  PTT:38.2 sec          Urinalysis Basic - ( 05 Jan 2025 05:45 )    Color: x / Appearance: x / SG: x / pH: x  Gluc: 188 mg/dL / Ketone: x  / Bili: x / Urobili: x   Blood: x / Protein: x / Nitrite: x   Leuk Esterase: x / RBC: x / WBC x   Sq Epi: x / Non Sq Epi: x / Bacteria: x        Cultures:        No events on tele   CHIEF COMPLAINT:  Patient is a 44y old  Male who presents with a chief complaint of MI (04 Jan 2025 12:40)      INTERVAL HISTORY/OVERNIGHT EVENTS:    No ON events    ======================  MEDICATIONS:  aMIOdarone    Tablet 400 milliGRAM(s) Oral two times a day  chlorhexidine 2% Cloths 1 Application(s) Topical daily  clopidogrel Tablet 75 milliGRAM(s) Oral daily  enoxaparin Injectable 100 milliGRAM(s) SubCutaneous every 12 hours  glucagon  Injectable 1 milliGRAM(s) IntraMuscular once  guaifenesin/dextromethorphan Oral Liquid 10 milliLiter(s) Oral every 4 hours  insulin glargine Injectable (LANTUS) 27 Unit(s) SubCutaneous at bedtime  insulin lispro (ADMELOG) corrective regimen sliding scale   SubCutaneous three times a day before meals  insulin lispro Injectable (ADMELOG) 9 Unit(s) SubCutaneous three times a day before meals  losartan 25 milliGRAM(s) Oral daily  melatonin 5 milliGRAM(s) Oral at bedtime  metoprolol tartrate 12.5 milliGRAM(s) Oral every 6 hours  nicotine -  14 mG/24Hr(s) Patch 1 Patch Transdermal every 24 hours  pantoprazole  Injectable 40 milliGRAM(s) IV Push with breakfast  regadenoson Injectable 0.4 milliGRAM(s) IV Push once    DRIPS:    PRN:       ======================  PHYSICAL EXAMINATION:  GEN:  nad.   HEENT:  eomi. ncat  PULM:  b/l crackles  CARD: s1, s2  ABD: +bs. ntnd  EXT:  no new rashes.    NEURO:  no new focal deficits.   ======================  OBJECTIVE:        VS:  T(F): 98.3 (01-05 @ 07:45), Max: 98.5 (01-04 @ 11:11)  HR: 69 (01-05 @ 07:45) (66 - 82)  BP: 117/76 (01-05 @ 07:45) (103/59 - 129/74)  RR: 31 (01-05 @ 07:45) (20 - 31)  SpO2: 93% (01-05 @ 07:45) (92% - 95%)  CVP(mm Hg): --  CO: --  CI: --  PA: --  PCWP: --    I/O:      01-02 @ 07:01  -  01-03 @ 07:00  --------------------------------------------------------  IN: 1324 mL / OUT: 1825 mL / NET: -501 mL    01-03 @ 07:01  -  01-04 @ 07:00  --------------------------------------------------------  IN: 1080 mL / OUT: 2100 mL / NET: -1020 mL    01-04 @ 07:01  -  01-05 @ 07:00  --------------------------------------------------------  IN: 1060 mL / OUT: 2045 mL / NET: -985 mL        Weight trend:      ======================    LABS:                          12.4   14.67 )-----------( 412      ( 05 Jan 2025 05:45 )             37.5     01-05    133[L]  |  99  |  17  ----------------------------<  188[H]  5.0   |  19  |  0.8    Ca    8.0[L]      05 Jan 2025 05:45  Phos  4.1     01-05  Mg     2.0     01-05    TPro  6.2  /  Alb  2.9[L]  /  TBili  0.3  /  DBili  x   /  AST  27  /  ALT  24  /  AlkPhos  168[H]  01-05    LIVER FUNCTIONS - ( 05 Jan 2025 05:45 )  Alb: 2.9 g/dL / Pro: 6.2 g/dL / ALK PHOS: 168 U/L / ALT: 24 U/L / AST: 27 U/L / GGT: x           PTT - ( 04 Jan 2025 06:06 )  PTT:38.2 sec          Urinalysis Basic - ( 05 Jan 2025 05:45 )    Color: x / Appearance: x / SG: x / pH: x  Gluc: 188 mg/dL / Ketone: x  / Bili: x / Urobili: x   Blood: x / Protein: x / Nitrite: x   Leuk Esterase: x / RBC: x / WBC x   Sq Epi: x / Non Sq Epi: x / Bacteria: x        Cultures:        No events on tele

## 2025-01-05 NOTE — PROGRESS NOTE ADULT - ASSESSMENT
45 YO M with PMHx of HTN, IDDM, HLD, active smoker (1/2 PPD x 28 yrs), hepatitis B (s/p treatment) presented with CC chest pressure and SOB. Patient was admitted for late presentation of anterolateral MI.      #Late presentation anterior MI   #Acute HFrEF, ICM, EF 25-30%  #Sustained monomorphic slow Vtach; 12 min overnight on 12/28 with rate ~ 140s  #LV thrombus  #HTN  - s/p heparin gtt. Now on therapeutic lovenox. Requires long term anticoagulation for LV thrombus   - discontinued ASA 81 QD   - cw plavix 75mg qd, cw lovenox 100mg q12  - cw losartan 25 mg daily  - cw Metoprolol 12.5mg PO Q6  -s/p lidocaine   -c/w amiodarone 400 mg BID PO. today day 6  - 1/2/25 POCUS IVC < 2cm and collapsible. BP on the soft side. stop bumex.  bolus   - LHC if viability study shows viable myocardium. Viability study showed nonviable myocardium  - EP: pt will benefit from AICD implant for secondary prevention; to be placed after cardio workup is complete and pt no longer on isolation for COVID    #Acute hypoxic resp failure /mild ARDS COVID +ve  -CT chest with GGO and small right pleural effusion.   -xray 12-30: bilateral effusion with bilateral opacities. 1/2/25 x ray improved. 1/3/25 xray stable  - monitor off steroids as oxygen requirements are not worsening and had recent MI  - wean off o2 as tolerated.   - CRP trending down.  - monitor off abx    #HLD  #IDDM w/ hyperglycemia  -TSH 0.99  -A1c 10.8 - DM control  -  - on lantus 27 and lispro of 9.    Pending: cardio workup, AICD

## 2025-01-05 NOTE — PROGRESS NOTE ADULT - SUBJECTIVE AND OBJECTIVE BOX
SUBJECTIVE/OVERNIGHT EVENTS  Today is hospital day 10d. This morning patient was seen and examined at bedside, resting comfortably in bed. No acute or major events overnight.    PMH    Acute ischemic heart disease    Handoff    MEWS Score    HTN (hypertension)    Hepatitis B    DM (diabetes mellitus)    ACS (acute coronary syndrome)    DIFF BREATING    90+    PNA (pneumonia)    Pulmonary edema, acute    [email or web address]        MEDICATIONS  STANDING MEDICATIONS  aMIOdarone    Tablet 400 milliGRAM(s) Oral two times a day  chlorhexidine 2% Cloths 1 Application(s) Topical daily  clopidogrel Tablet 75 milliGRAM(s) Oral daily  enoxaparin Injectable 100 milliGRAM(s) SubCutaneous every 12 hours  glucagon  Injectable 1 milliGRAM(s) IntraMuscular once  guaifenesin/dextromethorphan Oral Liquid 10 milliLiter(s) Oral every 4 hours  insulin glargine Injectable (LANTUS) 27 Unit(s) SubCutaneous at bedtime  insulin lispro (ADMELOG) corrective regimen sliding scale   SubCutaneous three times a day before meals  insulin lispro Injectable (ADMELOG) 9 Unit(s) SubCutaneous three times a day before meals  losartan 25 milliGRAM(s) Oral daily  melatonin 5 milliGRAM(s) Oral at bedtime  metoprolol tartrate 12.5 milliGRAM(s) Oral every 6 hours  nicotine -  14 mG/24Hr(s) Patch 1 Patch Transdermal every 24 hours  pantoprazole  Injectable 40 milliGRAM(s) IV Push with breakfast  regadenoson Injectable 0.4 milliGRAM(s) IV Push once    PRN MEDICATIONS  acetaminophen     Tablet .. 650 milliGRAM(s) Oral every 6 hours PRN  benzocaine/menthol Lozenge 1 Lozenge Oral every 3 hours PRN    VITALS  T(F): 98.3 (01-05-25 @ 07:45), Max: 98.5 (01-04-25 @ 11:11)  HR: 69 (01-05-25 @ 07:45) (66 - 82)  BP: 117/76 (01-05-25 @ 07:45) (103/59 - 129/74)  RR: 31 (01-05-25 @ 07:45) (20 - 31)  SpO2: 93% (01-05-25 @ 07:45) (92% - 95%)  POCT Blood Glucose.: 192 mg/dL (01-05-25 @ 07:44)  POCT Blood Glucose.: 174 mg/dL (01-04-25 @ 21:21)  POCT Blood Glucose.: 175 mg/dL (01-04-25 @ 16:40)  POCT Blood Glucose.: 133 mg/dL (01-04-25 @ 11:15)    LABS             12.4   14.67 )-----------( 412      ( 01-05-25 @ 05:45 )             37.5     133  |  99  |  17  -------------------------<  188   01-05-25 @ 05:45  5.0  |  19  |  0.8    Ca      8.0     01-05-25 @ 05:45  Phos   4.1     01-05-25 @ 05:45  Mg     2.0     01-05-25 @ 05:45    TPro  6.2  /  Alb  2.9  /  TBili  0.3  /  DBili  x   /  AST  27  /  ALT  24  /  AlkPhos  168  /  GGT  x     01-05-25 @ 05:45    PTT - ( 01-04-25 @ 06:06 )  PTT:38.2 sec      Urinalysis Basic - ( 05 Jan 2025 05:45 )    Color: x / Appearance: x / SG: x / pH: x  Gluc: 188 mg/dL / Ketone: x  / Bili: x / Urobili: x   Blood: x / Protein: x / Nitrite: x   Leuk Esterase: x / RBC: x / WBC x   Sq Epi: x / Non Sq Epi: x / Bacteria: x

## 2025-01-06 LAB
ALBUMIN SERPL ELPH-MCNC: 2.9 G/DL — LOW (ref 3.5–5.2)
ALP SERPL-CCNC: 177 U/L — HIGH (ref 30–115)
ALT FLD-CCNC: 27 U/L — SIGNIFICANT CHANGE UP (ref 0–41)
ANION GAP SERPL CALC-SCNC: 12 MMOL/L — SIGNIFICANT CHANGE UP (ref 7–14)
ANION GAP SERPL CALC-SCNC: 15 MMOL/L — HIGH (ref 7–14)
AST SERPL-CCNC: 32 U/L — SIGNIFICANT CHANGE UP (ref 0–41)
BASE EXCESS BLDA CALC-SCNC: 1 MMOL/L — SIGNIFICANT CHANGE UP (ref -2–3)
BILIRUB SERPL-MCNC: 0.3 MG/DL — SIGNIFICANT CHANGE UP (ref 0.2–1.2)
BUN SERPL-MCNC: 15 MG/DL — SIGNIFICANT CHANGE UP (ref 10–20)
BUN SERPL-MCNC: 22 MG/DL — HIGH (ref 10–20)
CALCIUM SERPL-MCNC: 8.1 MG/DL — LOW (ref 8.4–10.4)
CALCIUM SERPL-MCNC: 8.3 MG/DL — LOW (ref 8.4–10.4)
CHLORIDE SERPL-SCNC: 98 MMOL/L — SIGNIFICANT CHANGE UP (ref 98–110)
CHLORIDE SERPL-SCNC: 99 MMOL/L — SIGNIFICANT CHANGE UP (ref 98–110)
CO2 SERPL-SCNC: 20 MMOL/L — SIGNIFICANT CHANGE UP (ref 17–32)
CO2 SERPL-SCNC: 21 MMOL/L — SIGNIFICANT CHANGE UP (ref 17–32)
CREAT SERPL-MCNC: 0.8 MG/DL — SIGNIFICANT CHANGE UP (ref 0.7–1.5)
CREAT SERPL-MCNC: 1.1 MG/DL — SIGNIFICANT CHANGE UP (ref 0.7–1.5)
CRP SERPL-MCNC: 154.1 MG/L — HIGH
EGFR: 112 ML/MIN/1.73M2 — SIGNIFICANT CHANGE UP
EGFR: 85 ML/MIN/1.73M2 — SIGNIFICANT CHANGE UP
GAS PNL BLDA: SIGNIFICANT CHANGE UP
GLUCOSE BLDC GLUCOMTR-MCNC: 139 MG/DL — HIGH (ref 70–99)
GLUCOSE BLDC GLUCOMTR-MCNC: 154 MG/DL — HIGH (ref 70–99)
GLUCOSE BLDC GLUCOMTR-MCNC: 174 MG/DL — HIGH (ref 70–99)
GLUCOSE BLDC GLUCOMTR-MCNC: 204 MG/DL — HIGH (ref 70–99)
GLUCOSE SERPL-MCNC: 129 MG/DL — HIGH (ref 70–99)
GLUCOSE SERPL-MCNC: 157 MG/DL — HIGH (ref 70–99)
HCO3 BLDA-SCNC: 23 MMOL/L — SIGNIFICANT CHANGE UP (ref 21–28)
HCT VFR BLD CALC: 38.7 % — LOW (ref 42–52)
HGB BLD-MCNC: 12.7 G/DL — LOW (ref 14–18)
IRON SATN MFR SERPL: 11 % — LOW (ref 15–50)
IRON SATN MFR SERPL: 25 UG/DL — LOW (ref 35–150)
MAGNESIUM SERPL-MCNC: 2 MG/DL — SIGNIFICANT CHANGE UP (ref 1.8–2.4)
MCHC RBC-ENTMCNC: 31.2 PG — HIGH (ref 27–31)
MCHC RBC-ENTMCNC: 32.8 G/DL — SIGNIFICANT CHANGE UP (ref 32–37)
MCV RBC AUTO: 95.1 FL — HIGH (ref 80–94)
MRSA PCR RESULT.: NEGATIVE — SIGNIFICANT CHANGE UP
NRBC # BLD: 0 /100 WBCS — SIGNIFICANT CHANGE UP (ref 0–0)
PCO2 BLDA: 29 MMHG — LOW (ref 35–48)
PH BLDA: 7.51 — HIGH (ref 7.35–7.45)
PHOSPHATE SERPL-MCNC: 4.7 MG/DL — SIGNIFICANT CHANGE UP (ref 2.1–4.9)
PLATELET # BLD AUTO: 400 K/UL — SIGNIFICANT CHANGE UP (ref 130–400)
PMV BLD: 9.2 FL — SIGNIFICANT CHANGE UP (ref 7.4–10.4)
PO2 BLDA: 68 MMHG — LOW (ref 83–108)
POTASSIUM SERPL-MCNC: 4.7 MMOL/L — SIGNIFICANT CHANGE UP (ref 3.5–5)
POTASSIUM SERPL-MCNC: 5 MMOL/L — SIGNIFICANT CHANGE UP (ref 3.5–5)
POTASSIUM SERPL-SCNC: 4.7 MMOL/L — SIGNIFICANT CHANGE UP (ref 3.5–5)
POTASSIUM SERPL-SCNC: 5 MMOL/L — SIGNIFICANT CHANGE UP (ref 3.5–5)
PROT SERPL-MCNC: 6.5 G/DL — SIGNIFICANT CHANGE UP (ref 6–8)
RBC # BLD: 4.07 M/UL — LOW (ref 4.7–6.1)
RBC # FLD: 12.9 % — SIGNIFICANT CHANGE UP (ref 11.5–14.5)
SAO2 % BLDA: 97.2 % — SIGNIFICANT CHANGE UP (ref 94–98)
SODIUM SERPL-SCNC: 132 MMOL/L — LOW (ref 135–146)
SODIUM SERPL-SCNC: 133 MMOL/L — LOW (ref 135–146)
TIBC SERPL-MCNC: 228 UG/DL — SIGNIFICANT CHANGE UP (ref 220–430)
UIBC SERPL-MCNC: 203 UG/DL — SIGNIFICANT CHANGE UP (ref 110–370)
WBC # BLD: 15.85 K/UL — HIGH (ref 4.8–10.8)
WBC # FLD AUTO: 15.85 K/UL — HIGH (ref 4.8–10.8)

## 2025-01-06 PROCEDURE — 93010 ELECTROCARDIOGRAM REPORT: CPT

## 2025-01-06 PROCEDURE — 99233 SBSQ HOSP IP/OBS HIGH 50: CPT

## 2025-01-06 PROCEDURE — 71045 X-RAY EXAM CHEST 1 VIEW: CPT | Mod: 26

## 2025-01-06 RX ORDER — GUAIFENESIN/DEXTROMETHORPHAN 200-10MG/5
10 LIQUID (ML) ORAL EVERY 4 HOURS
Refills: 0 | Status: DISCONTINUED | OUTPATIENT
Start: 2025-01-06 | End: 2025-01-16

## 2025-01-06 RX ORDER — AZITHROMYCIN MONOHYDRATE 200 MG/5ML
POWDER, FOR SUSPENSION ORAL
Refills: 0 | Status: DISCONTINUED | OUTPATIENT
Start: 2025-01-06 | End: 2025-01-07

## 2025-01-06 RX ORDER — FUROSEMIDE 20 MG
80 TABLET ORAL ONCE
Refills: 0 | Status: COMPLETED | OUTPATIENT
Start: 2025-01-06 | End: 2025-01-06

## 2025-01-06 RX ORDER — CETIRIZINE HYDROCHLORIDE 5 MG/5ML
10 SYRUP ORAL DAILY
Refills: 0 | Status: DISCONTINUED | OUTPATIENT
Start: 2025-01-06 | End: 2025-01-16

## 2025-01-06 RX ORDER — AZITHROMYCIN MONOHYDRATE 200 MG/5ML
500 POWDER, FOR SUSPENSION ORAL EVERY 24 HOURS
Refills: 0 | Status: DISCONTINUED | OUTPATIENT
Start: 2025-01-07 | End: 2025-01-07

## 2025-01-06 RX ORDER — BUMETANIDE 2 MG/1
1 TABLET ORAL
Qty: 20 | Refills: 0 | Status: DISCONTINUED | OUTPATIENT
Start: 2025-01-06 | End: 2025-01-07

## 2025-01-06 RX ORDER — AZITHROMYCIN MONOHYDRATE 200 MG/5ML
500 POWDER, FOR SUSPENSION ORAL ONCE
Refills: 0 | Status: COMPLETED | OUTPATIENT
Start: 2025-01-06 | End: 2025-01-06

## 2025-01-06 RX ADMIN — IPRATROPIUM BROMIDE AND ALBUTEROL SULFATE 3 MILLILITER(S): .5; 2.5 SOLUTION RESPIRATORY (INHALATION) at 10:45

## 2025-01-06 RX ADMIN — Medication 5 MILLIGRAM(S): at 21:50

## 2025-01-06 RX ADMIN — NICOTINE POLACRILEX 1 PATCH: 4 LOZENGE ORAL at 05:07

## 2025-01-06 RX ADMIN — CHLORHEXIDINE GLUCONATE 1 APPLICATION(S): 1.2 RINSE ORAL at 05:15

## 2025-01-06 RX ADMIN — BENZOCAINE AND MENTHOL 1 LOZENGE: 15; 3.6 LOZENGE ORAL at 21:51

## 2025-01-06 RX ADMIN — Medication 12.5 MILLIGRAM(S): at 05:09

## 2025-01-06 RX ADMIN — Medication 2: at 17:29

## 2025-01-06 RX ADMIN — NICOTINE POLACRILEX 1 PATCH: 4 LOZENGE ORAL at 20:41

## 2025-01-06 RX ADMIN — AZITHROMYCIN MONOHYDRATE 500 MILLIGRAM(S): 200 POWDER, FOR SUSPENSION ORAL at 11:04

## 2025-01-06 RX ADMIN — Medication 12.5 MILLIGRAM(S): at 00:14

## 2025-01-06 RX ADMIN — Medication 1: at 11:45

## 2025-01-06 RX ADMIN — LOSARTAN POTASSIUM 25 MILLIGRAM(S): 100 TABLET, FILM COATED ORAL at 05:10

## 2025-01-06 RX ADMIN — CETIRIZINE HYDROCHLORIDE 10 MILLIGRAM(S): 5 SYRUP ORAL at 22:48

## 2025-01-06 RX ADMIN — BENZOCAINE AND MENTHOL 1 LOZENGE: 15; 3.6 LOZENGE ORAL at 04:33

## 2025-01-06 RX ADMIN — BENZOCAINE AND MENTHOL 1 LOZENGE: 15; 3.6 LOZENGE ORAL at 09:48

## 2025-01-06 RX ADMIN — ACETAMINOPHEN 650 MILLIGRAM(S): 80 SOLUTION/ DROPS ORAL at 21:50

## 2025-01-06 RX ADMIN — Medication 100 MILLIGRAM(S): at 06:02

## 2025-01-06 RX ADMIN — BENZOCAINE AND MENTHOL 1 LOZENGE: 15; 3.6 LOZENGE ORAL at 13:25

## 2025-01-06 RX ADMIN — Medication 9 UNIT(S): at 08:15

## 2025-01-06 RX ADMIN — ACETAMINOPHEN 650 MILLIGRAM(S): 80 SOLUTION/ DROPS ORAL at 00:14

## 2025-01-06 RX ADMIN — CLOPIDOGREL BISULFATE 75 MILLIGRAM(S): 75 TABLET, FILM COATED ORAL at 11:04

## 2025-01-06 RX ADMIN — ACETAMINOPHEN 650 MILLIGRAM(S): 80 SOLUTION/ DROPS ORAL at 06:17

## 2025-01-06 RX ADMIN — BUMETANIDE 2.5 MG/HR: 2 TABLET ORAL at 15:55

## 2025-01-06 RX ADMIN — ACETAMINOPHEN 650 MILLIGRAM(S): 80 SOLUTION/ DROPS ORAL at 22:59

## 2025-01-06 RX ADMIN — Medication 1: at 08:14

## 2025-01-06 RX ADMIN — ENOXAPARIN SODIUM 100 MILLIGRAM(S): 60 INJECTION INTRAVENOUS; SUBCUTANEOUS at 05:11

## 2025-01-06 RX ADMIN — AMIODARONE HYDROCHLORIDE 400 MILLIGRAM(S): 200 TABLET ORAL at 17:13

## 2025-01-06 RX ADMIN — Medication 100 MILLIGRAM(S): at 17:13

## 2025-01-06 RX ADMIN — ACETAMINOPHEN 650 MILLIGRAM(S): 80 SOLUTION/ DROPS ORAL at 01:14

## 2025-01-06 RX ADMIN — Medication 9 UNIT(S): at 17:30

## 2025-01-06 RX ADMIN — Medication 12.5 MILLIGRAM(S): at 23:02

## 2025-01-06 RX ADMIN — PANTOPRAZOLE 40 MILLIGRAM(S): 40 TABLET, DELAYED RELEASE ORAL at 08:15

## 2025-01-06 RX ADMIN — BENZOCAINE AND MENTHOL 1 LOZENGE: 15; 3.6 LOZENGE ORAL at 17:13

## 2025-01-06 RX ADMIN — ENOXAPARIN SODIUM 100 MILLIGRAM(S): 60 INJECTION INTRAVENOUS; SUBCUTANEOUS at 17:12

## 2025-01-06 RX ADMIN — Medication 12.5 MILLIGRAM(S): at 17:12

## 2025-01-06 RX ADMIN — AMIODARONE HYDROCHLORIDE 400 MILLIGRAM(S): 200 TABLET ORAL at 05:09

## 2025-01-06 RX ADMIN — NICOTINE POLACRILEX 1 PATCH: 4 LOZENGE ORAL at 06:02

## 2025-01-06 RX ADMIN — Medication 100 MILLIGRAM(S): at 13:24

## 2025-01-06 RX ADMIN — Medication 80 MILLIGRAM(S): at 09:58

## 2025-01-06 RX ADMIN — Medication 100 MILLIGRAM(S): at 21:52

## 2025-01-06 RX ADMIN — Medication 100 MILLIGRAM(S): at 09:58

## 2025-01-06 RX ADMIN — INSULIN GLARGINE-YFGN 27 UNIT(S): 100 INJECTION, SOLUTION SUBCUTANEOUS at 21:51

## 2025-01-06 RX ADMIN — Medication 12.5 MILLIGRAM(S): at 11:04

## 2025-01-06 RX ADMIN — NICOTINE POLACRILEX 1 PATCH: 4 LOZENGE ORAL at 09:19

## 2025-01-06 RX ADMIN — Medication 9 UNIT(S): at 11:45

## 2025-01-06 NOTE — CONSULT NOTE ADULT - SUBJECTIVE AND OBJECTIVE BOX
Outpt cardiologist:    HPI:  44M with HTN, IDDM, HLD, active smoker (1/2 PPD x 28 yrs), hepatitis B (s/p treatment) presents with CC of chest pressure and SOB. 2 weeks ago, he started having cough w/ yellow and white sputum runny nose, generalized body aches. Then started developing REGAN about a week ago along with left sided chest pressure, initially about 6-7/10. The chest pressure/SOB are worse with laying flat, improve with leaning forward. No nausea vomiting, reports loose BM 1-2 per day x 2 weeks. Reports burning at end of urination about a week ago which has resolved.  His last alcoholic drink was 3 weeks ago. Also uses Timothy Dust occasionally, last use few weeks ago. Denies any injected drugs or cocaine.  Has not taken his meds including insulin in the last 2 weeks due to feeling sick and not eating much.    Triage VS: /98, , RR 26, SpO2 92% on RA, T 98.6F.  Labs: WBC 11.77k, D-dimer 543, CRP 91, HS troponin 400, pro-BNP 4126.  UA w/ 100 protein, 500 glucose, otherwise negative.  SARS-CoV-2 positive.  CXR w/ bilateral opacities.    ED interventions: ASA 325mg, ticagrelor 180mg, DuoNeb, ketorolac, 125mg IV methylprednisolone, 80mg IV furosemide. Was also placed on BIPAP which he did not tolerate (made him very anxious), despite repeated counseling. (27 Dec 2024 00:27)      ---  cardio fellow additional notes:        PAST MEDICAL & SURGICAL HISTORY  HTN (hypertension)    Hepatitis B    DM (diabetes mellitus)        FAMILY HISTORY:  FAMILY HISTORY:      SOCIAL HISTORY:  Social History:      ALLERGIES:  Seafood (Urticaria)  No Known Drug Allergies  shellfish (Urticaria; Rash; Short breath)      MEDICATIONS:  aMIOdarone    Tablet 400 milliGRAM(s) Oral two times a day  azithromycin  IVPB      azithromycin  IVPB 500 milliGRAM(s) IV Intermittent once  cefepime   IVPB 2000 milliGRAM(s) IV Intermittent once  cefepime   IVPB 2000 milliGRAM(s) IV Intermittent every 8 hours  cefepime   IVPB      chlorhexidine 2% Cloths 1 Application(s) Topical daily  clopidogrel Tablet 75 milliGRAM(s) Oral daily  enoxaparin Injectable 100 milliGRAM(s) SubCutaneous every 12 hours  furosemide   Injectable 80 milliGRAM(s) IV Push once  glucagon  Injectable 1 milliGRAM(s) IntraMuscular once  guaifenesin/dextromethorphan Oral Liquid 10 milliLiter(s) Oral every 4 hours  insulin glargine Injectable (LANTUS) 27 Unit(s) SubCutaneous at bedtime  insulin lispro (ADMELOG) corrective regimen sliding scale   SubCutaneous three times a day before meals  insulin lispro Injectable (ADMELOG) 9 Unit(s) SubCutaneous three times a day before meals  losartan 25 milliGRAM(s) Oral daily  melatonin 5 milliGRAM(s) Oral at bedtime  metoprolol tartrate 12.5 milliGRAM(s) Oral every 6 hours  nicotine -  14 mG/24Hr(s) Patch 1 Patch Transdermal every 24 hours  pantoprazole  Injectable 40 milliGRAM(s) IV Push with breakfast  regadenoson Injectable 0.4 milliGRAM(s) IV Push once    PRN:  acetaminophen     Tablet .. 650 milliGRAM(s) Oral every 6 hours PRN  albuterol/ipratropium for Nebulization 3 milliLiter(s) Nebulizer every 6 hours PRN  benzocaine/menthol Lozenge 1 Lozenge Oral every 3 hours PRN  benzonatate 100 milliGRAM(s) Oral every 8 hours PRN      HOME MEDICATIONS:  Home Medications:  atorvastatin 40 mg oral tablet: 1 tab(s) orally once a day (at bedtime) (27 Dec 2024 14:48)  Insulin Glargine: 85 unit(s) subcutaneous once a day (27 Dec 2024 14:52)  Insulin Lispro: 40 unit(s) subcutaneous once a day (27 Dec 2024 14:57)  Jardiance 25 mg oral tablet: 1 tab(s) orally once a day (27 Dec 2024 14:59)  losartan 25 mg oral tablet: 1 tab(s) orally once a day (27 Dec 2024 14:59)  metFORMIN 1000 mg oral tablet: 1 tab(s) orally 2 times a day (27 Dec 2024 14:59)  Ozempic 2 mg/1.5 mL (1 mg dose) subcutaneous solution: 2 milligram(s) subcutaneous once a week (27 Dec 2024 14:59)      VITALS:   T(F): 97.6 ( @ 08:21), Max: 98.9 ( @ 20:05)  HR: 76 ( @ 08:21) (63 - 82)  BP: 112/62 ( @ 08:21) (88/53 - 137/86)  BP(mean): 80 ( @ 08:21) (66 - 107)  RR: 19 ( @ 08:21) (19 - 34)  SpO2: 93% ( @ 08:21) (91% - 97%)    I&O's Summary    2025 07:01  -  2025 07:00  --------------------------------------------------------  IN: 870 mL / OUT: 2000 mL / NET: -1130 mL        REVIEW OF SYSTEMS:  CONSTITUTIONAL: No weakness, fevers or chills  HEENT: No visual changes, neck/ear pain  RESPIRATORY: No cough, sob  CARDIOVASCULAR: See HPI  GASTROINTESTINAL: No abdominal pain. No nausea, vomiting, diarrhea   GENITOURINARY: No dysuria, frequency or hematuria  NEUROLOGICAL: No new focal deficits  SKIN: No new rashes    PHYSICAL EXAM:  General: Not in distress.  Non-toxic appearing.   HEENT: EOMI  Cardio: regular, S1, S2, no murmur  Pulm: B/L BS.  No wheezing / crackles / rales  Abdomen: Soft, non-tender, non-distended. Normoactive bowel sounds  Extremities: No edema b/l le  Neuro: A&O x3. No focal deficits    LABS:                        12.7   15.85 )-----------( 400      ( 2025 05:43 )             38.7         133[L]  |  98  |  15  ----------------------------<  157[H]  5.0   |  20  |  0.8    Ca    8.3[L]      2025 05:43  Phos  4.7     -  Mg     2.0     -    TPro  6.5  /  Alb  2.9[L]  /  TBili  0.3  /  DBili  x   /  AST  32  /  ALT  27  /  AlkPhos  177[H]                Troponin trend:       Chol 226[H] LDL -- HDL 50 Trig 84      RADIOLOGY:  -CXR:  -TTE:  -CCTA:  -STRESS TEST:  -CATHETERIZATION:  -OTHER:  EC Lead ECG:   Ventricular Rate 72 BPM    Atrial Rate 72 BPM    P-R Interval 192 ms    QRS Duration 100 ms    Q-T Interval 500 ms    QTC Calculation(Bazett) 547 ms    P Axis 25 degrees    R Axis 124 degrees    T Axis 111 degrees    Diagnosis Line Normal sinus rhythm  Anterolateral infarct , age undetermined  Prolonged QT  Abnormal ECG    Confirmed by Peng Heath ([id number]) on 2025 10:42:11 AM ( @ 09:32)      TELEMETRY EVENTS:   Outpt cardiologist:    HPI:  44M with HTN, IDDM, HLD, active smoker (1/2 PPD x 28 yrs), hepatitis B (s/p treatment) presents with CC of chest pressure and SOB. 2 weeks ago, he started having cough w/ yellow and white sputum runny nose, generalized body aches. Then started developing REGAN about a week ago along with left sided chest pressure, initially about 6-7/10. The chest pressure/SOB are worse with laying flat, improve with leaning forward. No nausea vomiting, reports loose BM 1-2 per day x 2 weeks. Reports burning at end of urination about a week ago which has resolved.  His last alcoholic drink was 3 weeks ago. Also uses Timothy Dust occasionally, last use few weeks ago. Denies any injected drugs or cocaine.  Has not taken his meds including insulin in the last 2 weeks due to feeling sick and not eating much.    Triage VS: /98, , RR 26, SpO2 92% on RA, T 98.6F.  Labs: WBC 11.77k, D-dimer 543, CRP 91, HS troponin 400, pro-BNP 4126.  UA w/ 100 protein, 500 glucose, otherwise negative.  SARS-CoV-2 positive.  CXR w/ bilateral opacities.    ED interventions: ASA 325mg, ticagrelor 180mg, DuoNeb, ketorolac, 125mg IV methylprednisolone, 80mg IV furosemide. Was also placed on BIPAP which he did not tolerate (made him very anxious), despite repeated counseling. (27 Dec 2024 00:27)      ---  cardio fellow additional notes:    Mr. Grider has been on diuretics on and off since admission he is down 6 Kg and is net negative 17 liters for the entire hospitalization. This morning he is still visibly short of breath on examination has LE edema. His IVC was 1.6 <50% collapsible. He is still orthopneic so he was re-started on IV lasix today with good initial response    PAST MEDICAL & SURGICAL HISTORY  HTN (hypertension)    Hepatitis B    DM (diabetes mellitus)        FAMILY HISTORY:  FAMILY HISTORY:      SOCIAL HISTORY:  Social History:      ALLERGIES:  Seafood (Urticaria)  No Known Drug Allergies  shellfish (Urticaria; Rash; Short breath)      MEDICATIONS:  aMIOdarone    Tablet 400 milliGRAM(s) Oral two times a day  azithromycin  IVPB      azithromycin  IVPB 500 milliGRAM(s) IV Intermittent once  cefepime   IVPB 2000 milliGRAM(s) IV Intermittent once  cefepime   IVPB 2000 milliGRAM(s) IV Intermittent every 8 hours  cefepime   IVPB      chlorhexidine 2% Cloths 1 Application(s) Topical daily  clopidogrel Tablet 75 milliGRAM(s) Oral daily  enoxaparin Injectable 100 milliGRAM(s) SubCutaneous every 12 hours  furosemide   Injectable 80 milliGRAM(s) IV Push once  glucagon  Injectable 1 milliGRAM(s) IntraMuscular once  guaifenesin/dextromethorphan Oral Liquid 10 milliLiter(s) Oral every 4 hours  insulin glargine Injectable (LANTUS) 27 Unit(s) SubCutaneous at bedtime  insulin lispro (ADMELOG) corrective regimen sliding scale   SubCutaneous three times a day before meals  insulin lispro Injectable (ADMELOG) 9 Unit(s) SubCutaneous three times a day before meals  losartan 25 milliGRAM(s) Oral daily  melatonin 5 milliGRAM(s) Oral at bedtime  metoprolol tartrate 12.5 milliGRAM(s) Oral every 6 hours  nicotine -  14 mG/24Hr(s) Patch 1 Patch Transdermal every 24 hours  pantoprazole  Injectable 40 milliGRAM(s) IV Push with breakfast  regadenoson Injectable 0.4 milliGRAM(s) IV Push once    PRN:  acetaminophen     Tablet .. 650 milliGRAM(s) Oral every 6 hours PRN  albuterol/ipratropium for Nebulization 3 milliLiter(s) Nebulizer every 6 hours PRN  benzocaine/menthol Lozenge 1 Lozenge Oral every 3 hours PRN  benzonatate 100 milliGRAM(s) Oral every 8 hours PRN      HOME MEDICATIONS:  Home Medications:  atorvastatin 40 mg oral tablet: 1 tab(s) orally once a day (at bedtime) (27 Dec 2024 14:48)  Insulin Glargine: 85 unit(s) subcutaneous once a day (27 Dec 2024 14:52)  Insulin Lispro: 40 unit(s) subcutaneous once a day (27 Dec 2024 14:57)  Jardiance 25 mg oral tablet: 1 tab(s) orally once a day (27 Dec 2024 14:59)  losartan 25 mg oral tablet: 1 tab(s) orally once a day (27 Dec 2024 14:59)  metFORMIN 1000 mg oral tablet: 1 tab(s) orally 2 times a day (27 Dec 2024 14:59)  Ozempic 2 mg/1.5 mL (1 mg dose) subcutaneous solution: 2 milligram(s) subcutaneous once a week (27 Dec 2024 14:59)      VITALS:   T(F): 97.6 ( @ 08:21), Max: 98.9 ( @ 20:05)  HR: 76 ( @ 08:21) (63 - 82)  BP: 112/62 ( @ 08:21) (88/53 - 137/86)  BP(mean): 80 ( @ 08:21) (66 - 107)  RR: 19 ( @ 08:21) (19 - 34)  SpO2: 93% ( @ 08:21) (91% - 97%)    I&O's Summary    2025 07:01  -  2025 07:00  --------------------------------------------------------  IN: 870 mL / OUT: 2000 mL / NET: -1130 mL          PHYSICAL EXAM:   Cardio: regular, S1, S2, no murmur  Pulm: B/L crackles   Abdomen: Soft, non-tender, non-distended.   Extremities: +ve edema  Neuro: A&O x3.moving all extremities    LABS:                        12.7   15.85 )-----------( 400      ( 2025 05:43 )             38.7         133[L]  |  98  |  15  ----------------------------<  157[H]  5.0   |  20  |  0.8    Ca    8.3[L]      2025 05:43  Phos  4.7       Mg     2.0         TPro  6.5  /  Alb  2.9[L]  /  TBili  0.3  /  DBili  x   /  AST  32  /  ALT  27  /  AlkPhos  177[H]                Troponin trend:       Chol 226[H] LDL -- HDL 50 Trig 84      RADIOLOGY:  -CXR:    IMPRESSION: Increased bibasilar pleural effusion. Unchanged diffuse   bilateral patchy opacities.    --- End of Report ---          AL GARCIA MD; Resident Radiologist  This document has been electronically signed.  KINSEY ACOSTA MD; Attending Radiologist  This document has been electronically signed. 2025  8:17AM  -TTE:  Summary:   1. Endocardial visualization was enhanced with intravenous echo contrast.   2. Left ventricular ejection fraction, by visual estimation, is 25 to   30%.  3. Severely decreased global left ventricular systolic function.   4. Multiple left ventricular regional wall motion abnormalities exist.   See wall motion findings.   5. Spectral Doppler shows pseudonormal pattern of left ventricular   myocardial filling (Grade II diastolic dysfunction).   6. Small, fixed, anteroapical left ventricular thrombus.   7. Right ventricular apical hypokinesis.   8. Mild mitral regurgitation.   9. Findings discussed with CCU team.          -CCTA:  -STRESS TEST:  -CATHETERIZATION:  -OTHER:  IMPRESSION:  1.  Using sestamibi as a surrogate marker of viability,sestamibi uptake   is visualized in the inferior wall, lateral wall, apical aspect of the   septum and a small portion of the basal anterior wall demonstrating   viability.  2. No definite sestamibi uptake in apex, apical 2/3 of the anterior wall,   and apicoseptal region suggesting no viability. Clinical correlation is   suggested, and perhaps additional markers of viability may be useful.    2.  Dilated left ventricle with marked global hypokinesis. No thickening   of the apex, distal (apical) 2/3 of the anterior wall and apicoseptal   wall  . Inferior, lateral, inferoseptal and a small portion of   anterobasal wall do thicken    3 .Left ventricular ejection fraction was calculated to be 24% which is   low. Wall motion analysis suggests ejection fraction of 20%.   Echocardiographic estimated ejection fraction was 25-30% on 2024    --- End of Report ---  EC Lead ECG:   Ventricular Rate 72 BPM    Atrial Rate 72 BPM    P-R Interval 192 ms    QRS Duration 100 ms    Q-T Interval 500 ms    QTC Calculation(Bazett) 547 ms    P Axis 25 degrees    R Axis 124 degrees    T Axis 111 degrees    Diagnosis Line Normal sinus rhythm  Anterolateral infarct , age undetermined  Prolonged QT  Abnormal ECG    Confirmed by Peng Heath ([id number]) on 2025 10:42:11 AM ( @ 09:32)      TELEMETRY EVENTS:   No events on tele   Outpt cardiologist:    HPI:  44M with HTN, IDDM, HLD, active smoker (1/2 PPD x 28 yrs), hepatitis B (s/p treatment) presents with CC of chest pressure and SOB. 2 weeks ago, he started having cough w/ yellow and white sputum runny nose, generalized body aches. Then started developing REGAN about a week ago along with left sided chest pressure, initially about 6-7/10. The chest pressure/SOB are worse with laying flat, improve with leaning forward. No nausea vomiting, reports loose BM 1-2 per day x 2 weeks. Reports burning at end of urination about a week ago which has resolved.  His last alcoholic drink was 3 weeks ago. Also uses Timothy Dust occasionally, last use few weeks ago. Denies any injected drugs or cocaine.  Has not taken his meds including insulin in the last 2 weeks due to feeling sick and not eating much.    Triage VS: /98, , RR 26, SpO2 92% on RA, T 98.6F.  Labs: WBC 11.77k, D-dimer 543, CRP 91, HS troponin 400, pro-BNP 4126.  UA w/ 100 protein, 500 glucose, otherwise negative.  SARS-CoV-2 positive.  CXR w/ bilateral opacities.    ED interventions: ASA 325mg, ticagrelor 180mg, DuoNeb, ketorolac, 125mg IV methylprednisolone, 80mg IV furosemide. Was also placed on BIPAP which he did not tolerate (made him very anxious), despite repeated counseling. (27 Dec 2024 00:27)      ---  cardio fellow additional notes:    Mr. Grider has been on diuretics on and off since admission he is down 6 Kg and is net negative 17 liters for the entire hospitalization. This morning he is still visibly short of breath on examination has LE edema. His IVC was 1.6 <50% collapsible. He is still orthopneic so he was re-started on IV lasix today with good initial response.     PAST MEDICAL & SURGICAL HISTORY  HTN (hypertension)    Hepatitis B    DM (diabetes mellitus)        FAMILY HISTORY:  FAMILY HISTORY:      SOCIAL HISTORY:  Social History:      ALLERGIES:  Seafood (Urticaria)  No Known Drug Allergies  shellfish (Urticaria; Rash; Short breath)      MEDICATIONS:  aMIOdarone    Tablet 400 milliGRAM(s) Oral two times a day  azithromycin  IVPB      azithromycin  IVPB 500 milliGRAM(s) IV Intermittent once  cefepime   IVPB 2000 milliGRAM(s) IV Intermittent once  cefepime   IVPB 2000 milliGRAM(s) IV Intermittent every 8 hours  cefepime   IVPB      chlorhexidine 2% Cloths 1 Application(s) Topical daily  clopidogrel Tablet 75 milliGRAM(s) Oral daily  enoxaparin Injectable 100 milliGRAM(s) SubCutaneous every 12 hours  furosemide   Injectable 80 milliGRAM(s) IV Push once  glucagon  Injectable 1 milliGRAM(s) IntraMuscular once  guaifenesin/dextromethorphan Oral Liquid 10 milliLiter(s) Oral every 4 hours  insulin glargine Injectable (LANTUS) 27 Unit(s) SubCutaneous at bedtime  insulin lispro (ADMELOG) corrective regimen sliding scale   SubCutaneous three times a day before meals  insulin lispro Injectable (ADMELOG) 9 Unit(s) SubCutaneous three times a day before meals  losartan 25 milliGRAM(s) Oral daily  melatonin 5 milliGRAM(s) Oral at bedtime  metoprolol tartrate 12.5 milliGRAM(s) Oral every 6 hours  nicotine -  14 mG/24Hr(s) Patch 1 Patch Transdermal every 24 hours  pantoprazole  Injectable 40 milliGRAM(s) IV Push with breakfast  regadenoson Injectable 0.4 milliGRAM(s) IV Push once    PRN:  acetaminophen     Tablet .. 650 milliGRAM(s) Oral every 6 hours PRN  albuterol/ipratropium for Nebulization 3 milliLiter(s) Nebulizer every 6 hours PRN  benzocaine/menthol Lozenge 1 Lozenge Oral every 3 hours PRN  benzonatate 100 milliGRAM(s) Oral every 8 hours PRN      HOME MEDICATIONS:  Home Medications:  atorvastatin 40 mg oral tablet: 1 tab(s) orally once a day (at bedtime) (27 Dec 2024 14:48)  Insulin Glargine: 85 unit(s) subcutaneous once a day (27 Dec 2024 14:52)  Insulin Lispro: 40 unit(s) subcutaneous once a day (27 Dec 2024 14:57)  Jardiance 25 mg oral tablet: 1 tab(s) orally once a day (27 Dec 2024 14:59)  losartan 25 mg oral tablet: 1 tab(s) orally once a day (27 Dec 2024 14:59)  metFORMIN 1000 mg oral tablet: 1 tab(s) orally 2 times a day (27 Dec 2024 14:59)  Ozempic 2 mg/1.5 mL (1 mg dose) subcutaneous solution: 2 milligram(s) subcutaneous once a week (27 Dec 2024 14:59)      VITALS:   T(F): 97.6 ( @ 08:21), Max: 98.9 ( @ 20:05)  HR: 76 ( 08:21) (63 - 82)  BP: 112/62 ( @ 08:21) (88/53 - 137/86)  BP(mean): 80 ( @ 08:21) (66 - 107)  RR: 19 ( @ 08:21) (19 - 34)  SpO2: 93% ( @ 08:21) (91% - 97%)    I&O's Summary    2025 07:01  -  2025 07:00  --------------------------------------------------------  IN: 870 mL / OUT: 2000 mL / NET: -1130 mL          PHYSICAL EXAM:   Cardio: regular, S1, S2, no murmur  Pulm: B/L crackles   Abdomen: Soft, non-tender, non-distended.   Extremities: +ve edema  Neuro: A&O x3.moving all extremities    LABS:                        12.7   15.85 )-----------( 400      ( 2025 05:43 )             38.7         133[L]  |  98  |  15  ----------------------------<  157[H]  5.0   |  20  |  0.8    Ca    8.3[L]      2025 05:43  Phos  4.7       Mg     2.0         TPro  6.5  /  Alb  2.9[L]  /  TBili  0.3  /  DBili  x   /  AST  32  /  ALT  27  /  AlkPhos  177[H]                Troponin trend:       Chol 226[H] LDL -- HDL 50 Trig 84      RADIOLOGY:  -CXR:    IMPRESSION: Increased bibasilar pleural effusion. Unchanged diffuse   bilateral patchy opacities.    --- End of Report ---          AL GARCIA MD; Resident Radiologist  This document has been electronically signed.  KINSEY ACOSTA MD; Attending Radiologist  This document has been electronically signed. 2025  8:17AM  -TTE:  Summary:   1. Endocardial visualization was enhanced with intravenous echo contrast.   2. Left ventricular ejection fraction, by visual estimation, is 25 to   30%.  3. Severely decreased global left ventricular systolic function.   4. Multiple left ventricular regional wall motion abnormalities exist.   See wall motion findings.   5. Spectral Doppler shows pseudonormal pattern of left ventricular   myocardial filling (Grade II diastolic dysfunction).   6. Small, fixed, anteroapical left ventricular thrombus.   7. Right ventricular apical hypokinesis.   8. Mild mitral regurgitation.   9. Findings discussed with CCU team.          -CCTA:  -STRESS TEST:  -CATHETERIZATION:  -OTHER:  IMPRESSION:  1.  Using sestamibi as a surrogate marker of viability,sestamibi uptake   is visualized in the inferior wall, lateral wall, apical aspect of the   septum and a small portion of the basal anterior wall demonstrating   viability.  2. No definite sestamibi uptake in apex, apical 2/3 of the anterior wall,   and apicoseptal region suggesting no viability. Clinical correlation is   suggested, and perhaps additional markers of viability may be useful.    2.  Dilated left ventricle with marked global hypokinesis. No thickening   of the apex, distal (apical) 2/3 of the anterior wall and apicoseptal   wall  . Inferior, lateral, inferoseptal and a small portion of   anterobasal wall do thicken    3 .Left ventricular ejection fraction was calculated to be 24% which is   low. Wall motion analysis suggests ejection fraction of 20%.   Echocardiographic estimated ejection fraction was 25-30% on 2024    --- End of Report ---  EC Lead ECG:   Ventricular Rate 72 BPM    Atrial Rate 72 BPM    P-R Interval 192 ms    QRS Duration 100 ms    Q-T Interval 500 ms    QTC Calculation(Bazett) 547 ms    P Axis 25 degrees    R Axis 124 degrees    T Axis 111 degrees    Diagnosis Line Normal sinus rhythm  Anterolateral infarct , age undetermined  Prolonged QT  Abnormal ECG    Confirmed by Peng Heath ([id number]) on 2025 10:42:11 AM ( @ 09:32)      TELEMETRY EVENTS:   No events on tele

## 2025-01-06 NOTE — PROGRESS NOTE ADULT - ASSESSMENT
ASSESSMENT  44M with HTN, IDDM, HLD, active smoker (1/2 PPD x 28 yrs), hepatitis B (s/p treatment) presents with CC of chest pressure and SOB.      IMPRESSION  #Acute Hypoxemic Respiratory failure  #ARDS  - CT Chest No Cont (12.28.24 @ 16:56): IMPRESSION: Findings consistent with ARDS    #Pulmonary Edema  #NSTEMI  #VTach   #Acute HFrEF  - TTE 12/27 - EF 25-30%     #LV Thrombus  #COVID-19, recent infection   #DM II  #Hx of Hep B     #Obesity BMI (kg/m2): 32.2  #Abx allergy: No Known Drug Allergies    RECOMMENDATIONS  - while at CCU, had been improving with aggressive diuresis, but now on HFNC again; reviewed cardiology notes and does not appear to overtly overloaded on exam to fully explain worsening hypoxemia   - agree with cefepime 2g q 8 hours   - low suspicion for atypical infection, but can switch azithromycin to doxycycline 100 mg BID to avoid QTC effects of Azithromycin   - repeat CRP and procalcitonin   - unclear how COVID is playing into this -- there was question of steroids while he was at CCU, but steroids held off as he has improving and CRP was downtrending   - consider repeat CT chest    Please call or message on [employer] Teams if with any questions.  Spectra [id number]

## 2025-01-06 NOTE — PROGRESS NOTE ADULT - SUBJECTIVE AND OBJECTIVE BOX
24H events:    Patient is a 44y old Male who presents with a chief complaint of MI (04 Jan 2025 12:40)    Primary diagnosis of ACS (acute coronary syndrome)    Today is hospital day 11d. This morning patient was seen and examined at bedside, resting comfortably in bed.    No acute or major events overnight.    Code Status: FULL CODE       PAST MEDICAL & SURGICAL HISTORY  HTN (hypertension)    Hepatitis B    DM (diabetes mellitus)      SOCIAL HISTORY:  Social History:      ALLERGIES:  Seafood (Urticaria)  No Known Drug Allergies  shellfish (Urticaria; Rash; Short breath)    MEDICATIONS:  STANDING MEDICATIONS  aMIOdarone    Tablet 400 milliGRAM(s) Oral two times a day  azithromycin  IVPB      azithromycin  IVPB 500 milliGRAM(s) IV Intermittent once  cefepime   IVPB 2000 milliGRAM(s) IV Intermittent every 8 hours  cefepime   IVPB      chlorhexidine 2% Cloths 1 Application(s) Topical daily  clopidogrel Tablet 75 milliGRAM(s) Oral daily  enoxaparin Injectable 100 milliGRAM(s) SubCutaneous every 12 hours  glucagon  Injectable 1 milliGRAM(s) IntraMuscular once  guaifenesin/dextromethorphan Oral Liquid 10 milliLiter(s) Oral every 4 hours  insulin glargine Injectable (LANTUS) 27 Unit(s) SubCutaneous at bedtime  insulin lispro (ADMELOG) corrective regimen sliding scale   SubCutaneous three times a day before meals  insulin lispro Injectable (ADMELOG) 9 Unit(s) SubCutaneous three times a day before meals  losartan 25 milliGRAM(s) Oral daily  melatonin 5 milliGRAM(s) Oral at bedtime  metoprolol tartrate 12.5 milliGRAM(s) Oral every 6 hours  nicotine -  14 mG/24Hr(s) Patch 1 Patch Transdermal every 24 hours  pantoprazole  Injectable 40 milliGRAM(s) IV Push with breakfast  regadenoson Injectable 0.4 milliGRAM(s) IV Push once    PRN MEDICATIONS  acetaminophen     Tablet .. 650 milliGRAM(s) Oral every 6 hours PRN  albuterol/ipratropium for Nebulization 3 milliLiter(s) Nebulizer every 6 hours PRN  benzocaine/menthol Lozenge 1 Lozenge Oral every 3 hours PRN  benzonatate 100 milliGRAM(s) Oral every 8 hours PRN    VITALS:   T(F): 97.6  HR: 76  BP: 112/62  RR: 19  SpO2: 93%    PHYSICAL EXAM:  GENERAL: Tachypnea       NECK:  (x  ) Supple     (  ) neck stiffness     (  ) nuchal rigidity     (  )  no JVD     (  ) JVD present ( -- cm)    HEART:  Rate -->     (x  ) normal rate     (  ) bradycardic     (  ) tachycardic  Rhythm -->     ( x ) regular     (  ) regularly irregular     (  ) irregularly irregular  Murmurs -->     ( x ) normal s1s2     (  ) systolic murmur     (  ) diastolic murmur     (  ) continuous murmur      (  ) S3 present     (  ) S4 present    LUNGS:   (  )Unlabored respirations     ( x ) tachypnea  (  ) B/L air entry     (  x) decreased breath sounds in:  (location Bilateral     )    (  ) no adventitious sound     ( x ) crackles     (  ) wheezing      (  ) rhonchi      (specify location:       )  ( x ) chest wall tenderness (specify location:  Left diaphrgmatic area      )    ABDOMEN:   ( x) Soft     (  ) tense   |   ( x ) nondistended     (  ) distended   |   (x  ) +BS     (  ) hypoactive bowel sounds     (  ) hyperactive bowel sounds  (  ) nontender     (  ) RUQ tenderness     (  ) RLQ tenderness     (  ) LLQ tenderness     (  ) epigastric tenderness     (  ) diffuse tenderness  (  ) Splenomegaly      (  ) Hepatomegaly      (  ) Jaundice     (  ) ecchymosis     EXTREMITIES:  (  ) Normal     (  ) Rash     (  ) ecchymosis     (  ) varicose veins      ( x ) pitting edema   +2  (  ) non-pitting edema   (  ) ulceration     (  ) gangrene:     (location:     )    NERVOUS SYSTEM:    (  x) A&Ox3     (  ) confused     (  ) lethargic  CN II-XII:     (  ) Intact     (  ) deficits found     (Specify:     )   Upper extremities:     (  ) no sensorimotor deficits     (  ) weakness     (  ) loss of proprioception/vibration     (  ) loss of touch/temperature (specify:    )  Lower extremities:     (  ) no sensorimotor deficits     (  ) weakness     (  ) loss of proprioception/vibration     (  ) loss of touch/temperature (specify:    )    SKIN:   (  ) No rashes or lesions     (  ) maculopapular rash     (  ) pustules     (  ) vesicles     (  ) ulcer     (  ) ecchymosis     (specify location:     )    LABS:                        12.7   15.85 )-----------( 400      ( 06 Jan 2025 05:43 )             38.7     01-06    133[L]  |  98  |  15  ----------------------------<  157[H]  5.0   |  20  |  0.8    Ca    8.3[L]      06 Jan 2025 05:43  Phos  4.7     01-06  Mg     2.0     01-06    TPro  6.5  /  Alb  2.9[L]  /  TBili  0.3  /  DBili  x   /  AST  32  /  ALT  27  /  AlkPhos  177[H]  01-06      Urinalysis Basic - ( 06 Jan 2025 05:43 )    Color: x / Appearance: x / SG: x / pH: x  Gluc: 157 mg/dL / Ketone: x  / Bili: x / Urobili: x   Blood: x / Protein: x / Nitrite: x   Leuk Esterase: x / RBC: x / WBC x   Sq Epi: x / Non Sq Epi: x / Bacteria: x

## 2025-01-06 NOTE — PROGRESS NOTE ADULT - SUBJECTIVE AND OBJECTIVE BOX
SARAH FARMER  44y, Male  Allergy: Seafood (Urticaria)  No Known Drug Allergies  shellfish (Urticaria; Rash; Short breath)      LOS  11d    CHIEF COMPLAINT: MI (04 Jan 2025 12:40)      INTERVAL EVENTS/HPI  - No acute events overnight  - T(F): , Max: 98.9 (01-05-25 @ 20:05)  - resconsulted for worsening hypoxemia -- started empirically on cefepime  - WBC Count: 15.85 (01-06-25 @ 05:43)  WBC Count: 14.67 (01-05-25 @ 05:45)     - Creatinine: 0.8 (01-06-25 @ 05:43)  Creatinine: 0.8 (01-05-25 @ 05:45)       ROS  General: Denies rigors, nightsweats  HEENT: Denies headache, rhinorrhea, sore throat, eye pain  CV: Denies CP, palpitations  PULM: Denies wheezing, hemoptysis  GI: Denies hematemesis, hematochezia, melena  : Denies discharge, hematuria  MSK: Denies arthralgias, myalgias  SKIN: Denies rash, lesions  NEURO: Denies paresthesias, weakness  PSYCH: Denies depression, anxiety    VITALS:  T(F): 98, Max: 98.9 (01-05-25 @ 20:05)  HR: 75  BP: 100/56  RR: 20Vital Signs Last 24 Hrs  T(C): 36.7 (06 Jan 2025 14:45), Max: 37.2 (05 Jan 2025 20:05)  T(F): 98 (06 Jan 2025 14:45), Max: 98.9 (05 Jan 2025 20:05)  HR: 75 (06 Jan 2025 18:20) (74 - 85)  BP: 100/56 (06 Jan 2025 14:45) (94/55 - 137/86)  BP(mean): 72 (06 Jan 2025 14:45) (67 - 107)  RR: 20 (06 Jan 2025 18:20) (18 - 20)  SpO2: 97% (06 Jan 2025 18:20) (86% - 98%)    Parameters below as of 06 Jan 2025 18:20  Patient On (Oxygen Delivery Method): nasal cannula, high flow        PHYSICAL EXAM:  Gen: NAD, resting in bed  HEENT: Normocephalic, atraumatic  Neck: supple, no lymphadenopathy  CV: Regular rate & regular rhythm  Lungs: decreased BS at bases, no fremitus  Abdomen: Soft, BS present  Ext: Warm, well perfused  Neuro: non focal, awake  Skin: no rash, no erythema  Lines: no phlebitis    FH: Non-contributory  Social Hx: Non-contributory    TESTS & MEASUREMENTS:                        12.7   15.85 )-----------( 400      ( 06 Jan 2025 05:43 )             38.7     01-06    133[L]  |  98  |  15  ----------------------------<  157[H]  5.0   |  20  |  0.8    Ca    8.3[L]      06 Jan 2025 05:43  Phos  4.7     01-06  Mg     2.0     01-06    TPro  6.5  /  Alb  2.9[L]  /  TBili  0.3  /  DBili  x   /  AST  32  /  ALT  27  /  AlkPhos  177[H]  01-06      LIVER FUNCTIONS - ( 06 Jan 2025 05:43 )  Alb: 2.9 g/dL / Pro: 6.5 g/dL / ALK PHOS: 177 U/L / ALT: 27 U/L / AST: 32 U/L / GGT: x           Urinalysis Basic - ( 06 Jan 2025 05:43 )    Color: x / Appearance: x / SG: x / pH: x  Gluc: 157 mg/dL / Ketone: x  / Bili: x / Urobili: x   Blood: x / Protein: x / Nitrite: x   Leuk Esterase: x / RBC: x / WBC x   Sq Epi: x / Non Sq Epi: x / Bacteria: x        Culture - Blood (collected 12-26-24 @ 23:37)  Source: .Blood BLOOD  Final Report (01-01-25 @ 07:00):    No growth at 5 days    Culture - Blood (collected 12-26-24 @ 23:37)  Source: .Blood BLOOD  Final Report (01-01-25 @ 07:00):    No growth at 5 days            INFECTIOUS DISEASES TESTING  MRSA PCR Result.: Negative (01-06-25 @ 12:20)  MRSA PCR Result.: Negative (12-30-24 @ 22:36)  Procalcitonin: 0.16 (12-30-24 @ 10:40)      INFLAMMATORY MARKERS  C-Reactive Protein: 43.5 mg/L (01-04-25 @ 06:06)  C-Reactive Protein: 55.6 mg/L (01-03-25 @ 05:25)  C-Reactive Protein: 69.2 mg/L (01-02-25 @ 05:14)  C-Reactive Protein: 93.5 mg/L (01-01-25 @ 04:24)      RADIOLOGY & ADDITIONAL TESTS:  I have personally reviewed the last available Chest xray  CXR      CT      CARDIOLOGY TESTING  12 Lead ECG:   Ventricular Rate 72 BPM    Atrial Rate 72 BPM    P-R Interval 192 ms    QRS Duration 100 ms    Q-T Interval 500 ms    QTC Calculation(Bazett) 547 ms    P Axis 25 degrees    R Axis 124 degrees    T Axis 111 degrees    Diagnosis Line Normal sinus rhythm  Anterolateral infarct , age undetermined  Prolonged QT  Abnormal ECG    Confirmed by Peng Heath ([id number]) on 1/5/2025 10:42:11 AM (01-05-25 @ 09:32)  12 Lead ECG:   Ventricular Rate 71 BPM    Atrial Rate 71 BPM    P-R Interval 192 ms    QRS Duration 106 ms    Q-T Interval 504 ms    QTC Calculation(Bazett) 547 ms    P Axis 35 degrees    R Axis 92 degrees    T Axis 84 degrees    Diagnosis Line Normal sinus rhythm  Anterolateral infarct , age undetermined  Prolonged QT  Abnormal ECG    Confirmed by Peng Heath [id number]) on 1/4/2025 10:13:18 AM (01-04-25 @ 09:31)      MEDICATIONS  aMIOdarone    Tablet 400 Oral two times a day  azithromycin  IVPB     buMETAnide Infusion 0.5 IV Continuous <Continuous>  cefepime   IVPB 2000 IV Intermittent every 8 hours  cefepime   IVPB     chlorhexidine 2% Cloths 1 Topical daily  clopidogrel Tablet 75 Oral daily  enoxaparin Injectable 100 SubCutaneous every 12 hours  glucagon  Injectable 1 IntraMuscular once  guaifenesin/dextromethorphan Oral Liquid 10 Oral every 4 hours  insulin glargine Injectable (LANTUS) 27 SubCutaneous at bedtime  insulin lispro (ADMELOG) corrective regimen sliding scale  SubCutaneous three times a day before meals  insulin lispro Injectable (ADMELOG) 9 SubCutaneous three times a day before meals  losartan 25 Oral daily  melatonin 5 Oral at bedtime  metoprolol tartrate 12.5 Oral every 6 hours  nicotine -  14 mG/24Hr(s) Patch 1 Transdermal every 24 hours  pantoprazole  Injectable 40 IV Push with breakfast  regadenoson Injectable 0.4 IV Push once      WEIGHT  Weight (kg): 96 (12-27-24 @ 01:00)  Creatinine: 0.8 mg/dL (01-06-25 @ 05:43)      ANTIBIOTICS:  azithromycin  IVPB      cefepime   IVPB 2000 milliGRAM(s) IV Intermittent every 8 hours  cefepime   IVPB          All available historical records have been reviewed

## 2025-01-06 NOTE — CONSULT NOTE ADULT - ASSESSMENT
43 YO M with PMHx of HTN, IDDM, HLD, active smoker (1/2 PPD x 28 yrs), hepatitis B (s/p treatment) presented with CC chest pressure and SOB. Patient was admitted for late presentation of anterolateral MI.    IMPRESSION  #Delayed presentation [place] MI  #HF with severely reduced ejection fraction  #IDDM  #Active smoker  #VT  #LV thrombus  #Covid-19 PNA    PLAN  - Agree with IV diuresis titrate to target net negative 1.5 to 2 liters daily  - continue IV diuretics till orthopnea resolves or if the creatine bumps  - check iron profile  - strict I and O   - weigh daily   - C/W lopressor 12.5 Q 6   - C/W losartan 25 QD   - Farxiga on discharge  - RHC?  - Biopsy?    **Incomplete Note** 45 YO M with PMHx of HTN, IDDM, HLD, active smoker (1/2 PPD x 28 yrs), hepatitis B (s/p treatment) presented with CC chest pressure and SOB. Patient was admitted for late presentation of anterolateral MI.    IMPRESSION  #Delayed presentation [place] MI  #HF with severely reduced ejection fraction  #IDDM  #Active smoker  #VT  #LV thrombus  #Covid-19 PNA    PLAN  - Agree with IV diuresis can start with Lasix 80 IV QD titrate to target net negative 1.5 to 2 liters daily  - continue IV diuretics till orthopnea resolves or if the creatine bumps  - check iron profile  - strict I and O   - weigh daily   - C/W lopressor 12.5 Q 6   - C/W losartan 25 QD   - Patient will need a left and right heart cath once his respiratory status improves  - Will need to get pulmonary on board to evaluate COPD as a possible cause of his respiratory status in the setting of significant smoking history

## 2025-01-06 NOTE — PROGRESS NOTE ADULT - ASSESSMENT
45 YO M with PMHx of HTN, IDDM, HLD, active smoker (1/2 PPD x 28 yrs), hepatitis B (s/p treatment) presented with CC chest pressure and SOB. Patient was admitted for late presentation of anterolateral MI.    OVERNIGHT EVENTS: Patient has dyspnea ; cough with reddish sputum production. Saturating 90-91% on 4 L NC . Increased oxygen requirement.     #Late presentation anterior MI   #Acute HFrEF, ICM, EF 25-30%  #Sustained monomorphic slow Vtach; 12 min overnight on 12/28 with rate ~ 140s  #LV thrombus  #HTN  - s/p heparin gtt. Now on therapeutic lovenox. Requires long term anticoagulation for LV thrombus   - discontinued ASA 81 QD   - cw plavix 75mg qd, cw lovenox 100mg q12  - cw losartan 25 mg daily  - cw Metoprolol 12.5mg PO Q6  -s/p lidocaine   -c/w amiodarone 400 mg BID PO. today day 6  - Viability study showed nonviable myocardium  - EP following for VT        #HLD  #IDDM w/ hyperglycemia  -TSH 0.99  -A1c 10.8 - DM control  -  - on lantus 27 and lispro of 9.    DVT PPX: Lovenox  Diet: DASH CC with fluid restriction  DISPO: Cardiac SDU   43 YO M with PMHx of HTN, IDDM, HLD, active smoker (1/2 PPD x 28 yrs), hepatitis B (s/p treatment) presented with CC chest pressure and SOB. Patient was admitted for late presentation of anterolateral MI.    OVERNIGHT EVENTS: Patient has dyspnea ; cough with reddish sputum production. Saturating 90-91% on 4 L NC . Increased oxygen requirement.     #Late presentation anterior MI   #Acute HFrEF, ICM, EF 25-30%  #Sustained monomorphic slow Vtach; 12 min overnight on 12/28 with rate ~ 140s  #LV thrombus  #HTN  - discontinued ASA 81 QD   - cw plavix 75mg qd, cw lovenox 100mg q12  - cw losartan 25 mg daily  - cw Metoprolol 12.5mg PO Q6  -s/p lidocaine   -c/w amiodarone 400 mg BID PO. today day 6  -Viability study showed nonviable myocardium  - EP following for VT  -1/6:  80 mg Lasix given ;  Desaturation noted ; CXR bilateral opacities ; >> Patient does not want Bipap>> WIll try HFNC.   - Rule out pneumonia : Started on Cefepime and Azithro; Procal ; sputum culture ; LEgionella and strept.   - Consulted HF team , following     #HLD  #IDDM w/ hyperglycemia  -TSH 0.99  -A1c 10.8 - DM control  -  - on lantus 27 and lispro of 9.    DVT PPX: Lovenox  Diet: DASH CC with fluid restriction  DISPO: Cardiac SDU    BMP at 8 pm   F   43 YO M with PMHx of HTN, IDDM, HLD, active smoker (1/2 PPD x 28 yrs), hepatitis B (s/p treatment) presented with CC chest pressure and SOB. Patient was admitted for late presentation of anterolateral MI.    OVERNIGHT EVENTS: Patient has dyspnea ; cough with reddish sputum production. Saturating 90-91% on 4 L NC . Increased oxygen requirement.     #Late presentation anterior MI   #Acute HFrEF, ICM, EF 25-30%  #Sustained monomorphic slow Vtach; 12 min overnight on 12/28 with rate ~ 140s  #LV thrombus  #HTN  - discontinued ASA 81 QD   - cw plavix 75mg qd, cw lovenox 100mg q12  - cw losartan 25 mg daily  - cw Metoprolol 12.5mg PO Q6  -s/p lidocaine   -c/w amiodarone 400 mg BID PO. today day 6  -Viability study showed nonviable myocardium  - EP following for VT  -1/6:  80 mg Lasix given ;  Desaturation noted ; CXR bilateral opacities ; >> Patient does not want Bipap>> WIll try HFNC.   - ABG : ???  - Rule out pneumonia : Started on Cefepime and Azithro; Procal ; sputum culture ; LEgionella and strept. MRSA swab sent   - Pulm consulted; minimal pleural effusion ; not tappable    - Consulted HF team , following   ID consulted for possible need for steroids. given hx of COVID     #HLD  #IDDM w/ hyperglycemia  -TSH 0.99  -A1c 10.8 - DM control  -  - on lantus 27 and lispro of 9.    DVT PPX: Lovenox  Diet: DASH CC with fluid restriction  DISPO: Cardiac SDU    BMP at 8 pm   Full code    43 YO M with PMHx of HTN, IDDM, HLD, active smoker (1/2 PPD x 28 yrs), hepatitis B (s/p treatment) presented with CC chest pressure and SOB. Patient was admitted for late presentation of anterolateral MI.    OVERNIGHT EVENTS: Patient has dyspnea ; cough with reddish sputum production. Saturating 90-91% on 4 L NC . Increased oxygen requirement.     # Late presentation anterior MI   # Acute HFrEF, ICM, EF 25-30%  # Sustained monomorphic slow Vtach; 12 min overnight on 12/28 with rate ~ 140s  # LV thrombus  # HTN  - discontinued ASA 81 QD   - On plavix 75mg qd, Lovenox 100mg q12  - cw losartan 25 mg daily  - cw Metoprolol 12.5mg PO Q6  - s/p lidocaine   - c/w amiodarone 400 mg BID PO. today day 6  - Viability study showed nonviable myocardium  - EP following for VT:   -1/6:  80 mg Lasix given ;  Desaturation noted ; CXR bilateral opacities ; >> Patient does not want Bipap >> WIll try HFNC.   - ABG : Respiratory alkalosis. ; PaO2 68 CO2 29   - Started on Bumex 0.5 mg/hr.   - Rule out pneumonia : Started on Cefepime and Azithro; Procal ; sputum culture ; Legionella and strept. MRSA swab sent   - Pulm consulted; minimal pleural effusion ; not Tappable.     - Consulted HF team , following .   - ID consulted for possible need for steroids. Given hx of COVID ; Follow CRP   - Monitor intake and output   - BMP BID   - Keep K > 4.0 and Mag > 2.0.       #HLD  #IDDM w/ hyperglycemia  -TSH 0.99  -A1c 10.8 - DM control  -  - on lantus 27 and lispro of 9.    DVT PPX: Lovenox  Diet: DASH CC with fluid restriction  DISPO: Cardiac SDU    BMP at 8 pm   Full code    45 YO M with PMHx of HTN, IDDM, HLD, active smoker (1/2 PPD x 28 yrs), hepatitis B (s/p treatment) presented with CC chest pressure and SOB. Patient was admitted for late presentation of anterolateral MI [facility] after respiratory status optimization.     OVERNIGHT EVENTS: Patient has dyspnea ; cough with reddish sputum production. Saturating 90-91% on 4 L NC . Increased oxygen requirement.     # Late presentation anterior MI   # Acute HFrEF, ICM, EF 25-30%  # Sustained monomorphic slow Vtach; 12 min overnight on 12/28 with rate ~ 140s  # LV thrombus  # HTN  - discontinued ASA 81 QD   - On plavix 75mg qd, Lovenox 100mg q12  - cw losartan 25 mg daily  - cw Metoprolol 12.5mg PO Q6  - s/p lidocaine   - c/w amiodarone 400 mg BID PO. today day 6  - Viability study showed nonviable myocardium  - EP following for VT:   -1/6:  80 mg Lasix given ;  Desaturation noted ; CXR bilateral opacities ; >> Patient does not want Bipap >> WIll try HFNC.   - ABG : Respiratory alkalosis. ; PaO2 68 CO2 29   - Started on Bumex 0.5 mg/hr.   - Rule out pneumonia : Started on Cefepime and Azithro; Procal ; sputum culture ; Legionella and strept. MRSA swab sent   - Pulm consulted; minimal pleural effusion ; not Tappable.     - Consulted HF team , following .   - ID consulted for possible need for steroids. Given hx of COVID ; Follow CRP   - Monitor intake and output   - BMP BID   - Keep K > 4.0 and Mag > 2.0.       #HLD  #IDDM w/ hyperglycemia  -TSH 0.99  -A1c 10.8 - DM control  -  - on lantus 27 and lispro of 9.    DVT PPX: Lovenox  Diet: DASH CC with fluid restriction  DISPO: Cardiac SDU    BMP at 8 pm   Full code

## 2025-01-07 LAB
ALBUMIN SERPL ELPH-MCNC: 2.9 G/DL — LOW (ref 3.5–5.2)
ALP SERPL-CCNC: 182 U/L — HIGH (ref 30–115)
ALT FLD-CCNC: 29 U/L — SIGNIFICANT CHANGE UP (ref 0–41)
ANION GAP SERPL CALC-SCNC: 17 MMOL/L — HIGH (ref 7–14)
AST SERPL-CCNC: 29 U/L — SIGNIFICANT CHANGE UP (ref 0–41)
BASOPHILS # BLD AUTO: 0.06 K/UL — SIGNIFICANT CHANGE UP (ref 0–0.2)
BASOPHILS NFR BLD AUTO: 0.4 % — SIGNIFICANT CHANGE UP (ref 0–1)
BILIRUB SERPL-MCNC: 0.2 MG/DL — SIGNIFICANT CHANGE UP (ref 0.2–1.2)
BUN SERPL-MCNC: 25 MG/DL — HIGH (ref 10–20)
CALCIUM SERPL-MCNC: 8.2 MG/DL — LOW (ref 8.4–10.5)
CHLORIDE SERPL-SCNC: 95 MMOL/L — LOW (ref 98–110)
CO2 SERPL-SCNC: 22 MMOL/L — SIGNIFICANT CHANGE UP (ref 17–32)
CREAT SERPL-MCNC: 1.2 MG/DL — SIGNIFICANT CHANGE UP (ref 0.7–1.5)
EGFR: 76 ML/MIN/1.73M2 — SIGNIFICANT CHANGE UP
EOSINOPHIL # BLD AUTO: 1.04 K/UL — HIGH (ref 0–0.7)
EOSINOPHIL NFR BLD AUTO: 6.2 % — SIGNIFICANT CHANGE UP (ref 0–8)
GLUCOSE BLDC GLUCOMTR-MCNC: 136 MG/DL — HIGH (ref 70–99)
GLUCOSE BLDC GLUCOMTR-MCNC: 146 MG/DL — HIGH (ref 70–99)
GLUCOSE BLDC GLUCOMTR-MCNC: 331 MG/DL — HIGH (ref 70–99)
GLUCOSE BLDC GLUCOMTR-MCNC: 365 MG/DL — HIGH (ref 70–99)
GLUCOSE SERPL-MCNC: 151 MG/DL — HIGH (ref 70–99)
HCT VFR BLD CALC: 35.9 % — LOW (ref 42–52)
HGB BLD-MCNC: 11.9 G/DL — LOW (ref 14–18)
IMM GRANULOCYTES NFR BLD AUTO: 1.6 % — HIGH (ref 0.1–0.3)
LYMPHOCYTES # BLD AUTO: 13 % — LOW (ref 20.5–51.1)
LYMPHOCYTES # BLD AUTO: 2.17 K/UL — SIGNIFICANT CHANGE UP (ref 1.2–3.4)
MAGNESIUM SERPL-MCNC: 1.9 MG/DL — SIGNIFICANT CHANGE UP (ref 1.8–2.4)
MCHC RBC-ENTMCNC: 30.7 PG — SIGNIFICANT CHANGE UP (ref 27–31)
MCHC RBC-ENTMCNC: 33.1 G/DL — SIGNIFICANT CHANGE UP (ref 32–37)
MCV RBC AUTO: 92.5 FL — SIGNIFICANT CHANGE UP (ref 80–94)
MONOCYTES # BLD AUTO: 1.21 K/UL — HIGH (ref 0.1–0.6)
MONOCYTES NFR BLD AUTO: 7.2 % — SIGNIFICANT CHANGE UP (ref 1.7–9.3)
NEUTROPHILS # BLD AUTO: 11.96 K/UL — HIGH (ref 1.4–6.5)
NEUTROPHILS NFR BLD AUTO: 71.6 % — SIGNIFICANT CHANGE UP (ref 42.2–75.2)
NRBC # BLD: 0 /100 WBCS — SIGNIFICANT CHANGE UP (ref 0–0)
PLATELET # BLD AUTO: 390 K/UL — SIGNIFICANT CHANGE UP (ref 130–400)
PMV BLD: 9.6 FL — SIGNIFICANT CHANGE UP (ref 7.4–10.4)
POTASSIUM SERPL-MCNC: 4.3 MMOL/L — SIGNIFICANT CHANGE UP (ref 3.5–5)
POTASSIUM SERPL-SCNC: 4.3 MMOL/L — SIGNIFICANT CHANGE UP (ref 3.5–5)
PROCALCITONIN SERPL-MCNC: 0.22 NG/ML — HIGH (ref 0.02–0.1)
PROT SERPL-MCNC: 6.4 G/DL — SIGNIFICANT CHANGE UP (ref 6–8)
RBC # BLD: 3.88 M/UL — LOW (ref 4.7–6.1)
RBC # FLD: 12.8 % — SIGNIFICANT CHANGE UP (ref 11.5–14.5)
SODIUM SERPL-SCNC: 134 MMOL/L — LOW (ref 135–146)
WBC # BLD: 16.7 K/UL — HIGH (ref 4.8–10.8)
WBC # FLD AUTO: 16.7 K/UL — HIGH (ref 4.8–10.8)

## 2025-01-07 PROCEDURE — 99233 SBSQ HOSP IP/OBS HIGH 50: CPT

## 2025-01-07 PROCEDURE — 71045 X-RAY EXAM CHEST 1 VIEW: CPT | Mod: 26

## 2025-01-07 RX ORDER — DOXYCYCLINE MONOHYDRATE 100 MG
100 TABLET ORAL EVERY 12 HOURS
Refills: 0 | Status: DISCONTINUED | OUTPATIENT
Start: 2025-01-07 | End: 2025-01-15

## 2025-01-07 RX ORDER — DOXYCYCLINE MONOHYDRATE 100 MG
TABLET ORAL
Refills: 0 | Status: DISCONTINUED | OUTPATIENT
Start: 2025-01-07 | End: 2025-01-15

## 2025-01-07 RX ORDER — DEXAMETHASONE SODIUM PHOSPHATE 4 MG/ML
6 VIAL (ML) INJECTION DAILY
Refills: 0 | Status: DISCONTINUED | OUTPATIENT
Start: 2025-01-07 | End: 2025-01-07

## 2025-01-07 RX ORDER — MAGNESIUM SULFATE 500 MG/ML
2 INJECTION, SOLUTION INTRAMUSCULAR; INTRAVENOUS ONCE
Refills: 0 | Status: COMPLETED | OUTPATIENT
Start: 2025-01-07 | End: 2025-01-07

## 2025-01-07 RX ORDER — INSULIN LISPRO 100/ML
3 VIAL (ML) SUBCUTANEOUS ONCE
Refills: 0 | Status: COMPLETED | OUTPATIENT
Start: 2025-01-07 | End: 2025-01-07

## 2025-01-07 RX ORDER — DEXAMETHASONE SODIUM PHOSPHATE 4 MG/ML
20 VIAL (ML) INJECTION DAILY
Refills: 0 | Status: DISCONTINUED | OUTPATIENT
Start: 2025-01-07 | End: 2025-01-09

## 2025-01-07 RX ORDER — DEXAMETHASONE SODIUM PHOSPHATE 4 MG/ML
20 VIAL (ML) INJECTION DAILY
Refills: 0 | Status: DISCONTINUED | OUTPATIENT
Start: 2025-01-07 | End: 2025-01-07

## 2025-01-07 RX ORDER — INSULIN GLARGINE-YFGN 100 [IU]/ML
30 INJECTION, SOLUTION SUBCUTANEOUS AT BEDTIME
Refills: 0 | Status: DISCONTINUED | OUTPATIENT
Start: 2025-01-07 | End: 2025-01-09

## 2025-01-07 RX ORDER — TORSEMIDE 10 MG/1
20 TABLET ORAL DAILY
Refills: 0 | Status: DISCONTINUED | OUTPATIENT
Start: 2025-01-08 | End: 2025-01-08

## 2025-01-07 RX ORDER — DOXYCYCLINE MONOHYDRATE 100 MG
100 TABLET ORAL ONCE
Refills: 0 | Status: COMPLETED | OUTPATIENT
Start: 2025-01-07 | End: 2025-01-07

## 2025-01-07 RX ADMIN — Medication 6 MILLIGRAM(S): at 11:36

## 2025-01-07 RX ADMIN — NICOTINE POLACRILEX 1 PATCH: 4 LOZENGE ORAL at 06:09

## 2025-01-07 RX ADMIN — MAGNESIUM SULFATE 25 GRAM(S): 500 INJECTION, SOLUTION INTRAMUSCULAR; INTRAVENOUS at 09:07

## 2025-01-07 RX ADMIN — Medication 9 UNIT(S): at 11:28

## 2025-01-07 RX ADMIN — Medication 10 MILLILITER(S): at 17:23

## 2025-01-07 RX ADMIN — ACETAMINOPHEN 650 MILLIGRAM(S): 80 SOLUTION/ DROPS ORAL at 17:24

## 2025-01-07 RX ADMIN — Medication 100 MILLILITER(S): at 11:42

## 2025-01-07 RX ADMIN — NICOTINE POLACRILEX 1 PATCH: 4 LOZENGE ORAL at 06:20

## 2025-01-07 RX ADMIN — Medication 5 MILLIGRAM(S): at 21:22

## 2025-01-07 RX ADMIN — CHLORHEXIDINE GLUCONATE 1 APPLICATION(S): 1.2 RINSE ORAL at 06:19

## 2025-01-07 RX ADMIN — INSULIN GLARGINE-YFGN 30 UNIT(S): 100 INJECTION, SOLUTION SUBCUTANEOUS at 21:25

## 2025-01-07 RX ADMIN — CLOPIDOGREL BISULFATE 75 MILLIGRAM(S): 75 TABLET, FILM COATED ORAL at 11:29

## 2025-01-07 RX ADMIN — Medication 12.5 MILLIGRAM(S): at 11:30

## 2025-01-07 RX ADMIN — Medication 3 UNIT(S): at 21:25

## 2025-01-07 RX ADMIN — Medication 9 UNIT(S): at 16:32

## 2025-01-07 RX ADMIN — ENOXAPARIN SODIUM 100 MILLIGRAM(S): 60 INJECTION INTRAVENOUS; SUBCUTANEOUS at 17:23

## 2025-01-07 RX ADMIN — BUMETANIDE 5 MG/HR: 2 TABLET ORAL at 09:06

## 2025-01-07 RX ADMIN — Medication 100 MILLIGRAM(S): at 13:08

## 2025-01-07 RX ADMIN — Medication 12.5 MILLIGRAM(S): at 17:23

## 2025-01-07 RX ADMIN — IPRATROPIUM BROMIDE AND ALBUTEROL SULFATE 3 MILLILITER(S): .5; 2.5 SOLUTION RESPIRATORY (INHALATION) at 22:14

## 2025-01-07 RX ADMIN — Medication 100 MILLIGRAM(S): at 07:57

## 2025-01-07 RX ADMIN — NICOTINE POLACRILEX 1 PATCH: 4 LOZENGE ORAL at 20:06

## 2025-01-07 RX ADMIN — ACETAMINOPHEN 650 MILLIGRAM(S): 80 SOLUTION/ DROPS ORAL at 06:18

## 2025-01-07 RX ADMIN — AMIODARONE HYDROCHLORIDE 400 MILLIGRAM(S): 200 TABLET ORAL at 06:19

## 2025-01-07 RX ADMIN — ENOXAPARIN SODIUM 100 MILLIGRAM(S): 60 INJECTION INTRAVENOUS; SUBCUTANEOUS at 06:18

## 2025-01-07 RX ADMIN — AMIODARONE HYDROCHLORIDE 400 MILLIGRAM(S): 200 TABLET ORAL at 17:23

## 2025-01-07 RX ADMIN — LOSARTAN POTASSIUM 25 MILLIGRAM(S): 100 TABLET, FILM COATED ORAL at 06:19

## 2025-01-07 RX ADMIN — Medication 100 MILLIGRAM(S): at 17:22

## 2025-01-07 RX ADMIN — PANTOPRAZOLE 40 MILLIGRAM(S): 40 TABLET, DELAYED RELEASE ORAL at 07:56

## 2025-01-07 RX ADMIN — Medication 4: at 16:31

## 2025-01-07 RX ADMIN — CETIRIZINE HYDROCHLORIDE 10 MILLIGRAM(S): 5 SYRUP ORAL at 11:28

## 2025-01-07 RX ADMIN — Medication 110 MILLIGRAM(S): at 16:00

## 2025-01-07 RX ADMIN — Medication 100 MILLIGRAM(S): at 06:19

## 2025-01-07 RX ADMIN — Medication 100 MILLIGRAM(S): at 09:07

## 2025-01-07 RX ADMIN — Medication 9 UNIT(S): at 07:56

## 2025-01-07 RX ADMIN — Medication 100 MILLIGRAM(S): at 21:20

## 2025-01-07 RX ADMIN — NICOTINE POLACRILEX 1 PATCH: 4 LOZENGE ORAL at 07:45

## 2025-01-07 RX ADMIN — Medication 12.5 MILLIGRAM(S): at 23:40

## 2025-01-07 RX ADMIN — Medication 12.5 MILLIGRAM(S): at 06:21

## 2025-01-07 RX ADMIN — ACETAMINOPHEN 650 MILLIGRAM(S): 80 SOLUTION/ DROPS ORAL at 07:45

## 2025-01-07 RX ADMIN — Medication 100 MILLIGRAM(S): at 17:23

## 2025-01-07 NOTE — CONSULT NOTE ADULT - ATTENDING COMMENTS
44-year-old male past medical history hypertension, hyperlipidemia, diabetes, active smoker presents with dyspnea and chest pain.  Patient with recent COVID infection.  Patient found to have an NSTEMI and echo with severely reduced LV systolic function.  WMA in significant LAD territory.    Given multiple risk factors patient most likely has newly diagnosed ischemic cardiomyopathy.  He needs a coronary angiogram but is unable to lie flat due to significant hypoxia and shortness of breath.  Chest x-ray reviewed with significant bilateral infiltrates which could be residual COVID.  However I am also concerned that he may have underlying lung disease and he may benefit from a pulmonary consult.    On exam he is warm and well-perfused.  He is only mildly hypervolemic with JVP ~6 cm.  I do not think he needs to be aggressively diuresed.  Recommend maintaining net negative goal 1 L at most.  He will need an angiogram and so would hold off on SGLT2i at this time.  He is tolerating low-dose beta-blocker and losartan 25 mg daily.  I think is reasonable to continue these current doses.  He has hyperkalemia and so would hold off on MRA at this time.    Eduardo Jewell MD  Interventional Cardiology/Advanced Heart Failure Transplant
44-year-old male past medical history hypertension, hyperlipidemia, diabetes, active smoker presents with dyspnea and chest pain.  Patient with recent COVID infection.  Patient found to have an NSTEMI and echo with severely reduced LV systolic function.  WMA in significant LAD territory.    Given multiple risk factors patient most likely has newly diagnosed ischemic cardiomyopathy.  He needs a coronary angiogram but is unable to lie flat due to significant hypoxia, shortness of breath, and requiring NIVV.  Chest x-ray reviewed with significant bilateral infiltrates which could be residual COVID. Pulmonary consulted and recommending steroids, however given recent MI need to weight risk and benefit of starting steroids.     On exam he is warm and well-perfused.  He appears euvolemic with JVP~5cm.  I do not think he needs to be aggressively diuresed and Cre increased. Recommend starting maintenance torsemide 20 mg daily tomorrow to maintain euvolemia.  He will need an angiogram and so would hold off on SGLT2i at this time.  He is tolerating low-dose beta-blocker and losartan 25 mg daily.  I think is reasonable to continue these current doses. Hyperkalemia improving but BP soft and so will hold off on MRA at this time.    Eduardo Jewell MD  Interventional Cardiology/Advanced Heart Failure Transplant

## 2025-01-07 NOTE — CONSULT NOTE ADULT - NS ATTEST RISK PROBLEM GEN_ALL_CORE FT
Face-to-face encounter, review of extensive medical records in this and prior charts, laboratory findings, radiographic and imaging/diagnostic results; documentation as noted above and discussion of diagnostic impressions and plan with the patient and team.    Titration of vasoactive heart failure medications. High risk of decompensation
Face-to-face encounter, review of extensive medical records in this and prior charts, laboratory findings, radiographic and imaging/diagnostic results; documentation as noted above and discussion of diagnostic impressions and plan with the patient and team.    Titration of vasoactive heart failure medications. High risk of decompensation

## 2025-01-07 NOTE — CONSULT NOTE ADULT - CONSULT REASON
Hf with severely reduced ejection fraction
COVID, edema
VT
Hf with severely reduced ejection fraction
COVID

## 2025-01-07 NOTE — PROGRESS NOTE ADULT - SUBJECTIVE AND OBJECTIVE BOX
SARAH FARMER  44y, Male  Allergy: Seafood (Urticaria)  No Known Drug Allergies  shellfish (Urticaria; Rash; Short breath)      LOS  12d    CHIEF COMPLAINT: MI (04 Jan 2025 12:40)      INTERVAL EVENTS/HPI  - No acute events overnight  - T(F): , Max: 98.9 (01-07-25 @ 07:33)  - remains on HFNC -- productive cough   - WBC Count: 16.70 (01-07-25 @ 06:08)  WBC Count: 15.85 (01-06-25 @ 05:43)     - Creatinine: 1.2 (01-07-25 @ 06:08)  Creatinine: 1.1 (01-06-25 @ 21:35)       ROS  General: Denies rigors, nightsweats  HEENT: Denies headache, rhinorrhea, sore throat, eye pain  CV: Denies CP, palpitations  PULM: Denies wheezing, hemoptysis  GI: Denies hematemesis, hematochezia, melena  : Denies discharge, hematuria  MSK: Denies arthralgias, myalgias  SKIN: Denies rash, lesions  NEURO: Denies paresthesias, weakness  PSYCH: Denies depression, anxiety    VITALS:  T(F): 98.9, Max: 98.9 (01-07-25 @ 07:33)  HR: 64  BP: 90/55  RR: 20Vital Signs Last 24 Hrs  T(C): 37.2 (07 Jan 2025 07:33), Max: 37.2 (07 Jan 2025 07:33)  T(F): 98.9 (07 Jan 2025 07:33), Max: 98.9 (07 Jan 2025 07:33)  HR: 64 (07 Jan 2025 07:33) (61 - 85)  BP: 90/55 (07 Jan 2025 07:33) (87/50 - 113/58)  BP(mean): 67 (07 Jan 2025 07:33) (61 - 79)  RR: 20 (07 Jan 2025 07:33) (18 - 30)  SpO2: 98% (07 Jan 2025 07:33) (86% - 98%)    Parameters below as of 07 Jan 2025 07:33  Patient On (Oxygen Delivery Method): nasal cannula, high flow  O2 Flow (L/min): 50  O2 Concentration (%): 65    PHYSICAL EXAM:  Gen: NAD, resting in bed  HEENT: Normocephalic, atraumatic  Neck: supple, no lymphadenopathy  CV: Regular rate & regular rhythm  Lungs: decreased BS at bases, no fremitus  Abdomen: Soft, BS present  Ext: Warm, well perfused  Neuro: non focal, awake  Skin: no rash, no erythema  Lines: no phlebitis    FH: Non-contributory  Social Hx: Non-contributory    TESTS & MEASUREMENTS:                        11.9   16.70 )-----------( 390      ( 07 Jan 2025 06:08 )             35.9     01-07    134[L]  |  95[L]  |  25[H]  ----------------------------<  151[H]  4.3   |  22  |  1.2    Ca    8.2[L]      07 Jan 2025 06:08  Phos  4.7     01-06  Mg     1.9     01-07    TPro  6.4  /  Alb  2.9[L]  /  TBili  0.2  /  DBili  x   /  AST  29  /  ALT  29  /  AlkPhos  182[H]  01-07      LIVER FUNCTIONS - ( 07 Jan 2025 06:08 )  Alb: 2.9 g/dL / Pro: 6.4 g/dL / ALK PHOS: 182 U/L / ALT: 29 U/L / AST: 29 U/L / GGT: x           Urinalysis Basic - ( 07 Jan 2025 06:08 )    Color: x / Appearance: x / SG: x / pH: x  Gluc: 151 mg/dL / Ketone: x  / Bili: x / Urobili: x   Blood: x / Protein: x / Nitrite: x   Leuk Esterase: x / RBC: x / WBC x   Sq Epi: x / Non Sq Epi: x / Bacteria: x        Culture - Blood (collected 12-26-24 @ 23:37)  Source: .Blood BLOOD  Final Report (01-01-25 @ 07:00):    No growth at 5 days    Culture - Blood (collected 12-26-24 @ 23:37)  Source: .Blood BLOOD  Final Report (01-01-25 @ 07:00):    No growth at 5 days            INFECTIOUS DISEASES TESTING  MRSA PCR Result.: Negative (01-06-25 @ 12:20)  Procalcitonin: 0.22 (01-06-25 @ 11:13)  MRSA PCR Result.: Negative (12-30-24 @ 22:36)  Procalcitonin: 0.16 (12-30-24 @ 10:40)      INFLAMMATORY MARKERS  C-Reactive Protein: 154.1 mg/L (01-06-25 @ 16:43)  C-Reactive Protein: 43.5 mg/L (01-04-25 @ 06:06)  C-Reactive Protein: 55.6 mg/L (01-03-25 @ 05:25)  C-Reactive Protein: 69.2 mg/L (01-02-25 @ 05:14)      RADIOLOGY & ADDITIONAL TESTS:  I have personally reviewed the last available Chest xray  CXR      CT      CARDIOLOGY TESTING  12 Lead ECG:   Ventricular Rate 73 BPM    Atrial Rate 73 BPM    P-R Interval 190 ms    QRS Duration 106 ms    Q-T Interval 496 ms    QTC Calculation(Bazett) 546 ms    P Axis 12 degrees    R Axis 106 degrees    T Axis 62 degrees    Diagnosis Line Normal sinus rhythm  Anterolateral infarct , age undetermined  Prolonged QT  Abnormal ECG    Confirmed by Sandra Bush MD ([id number]) on 1/7/2025 9:11:02 AM (01-06-25 @ 07:38)  12 Lead ECG:   Ventricular Rate 72 BPM    Atrial Rate 72 BPM    P-R Interval 192 ms    QRS Duration 100 ms    Q-T Interval 500 ms    QTC Calculation(Bazett) 547 ms    P Axis 25 degrees    R Axis 124 degrees    T Axis 111 degrees    Diagnosis Line Normal sinus rhythm  Anterolateral infarct , age undetermined  Prolonged QT  Abnormal ECG    Confirmed by Peng Heath [id number]) on 1/5/2025 10:42:11 AM (01-05-25 @ 09:32)      MEDICATIONS  albumin human 25% IVPB 200 IV Intermittent every 2 hours  aMIOdarone    Tablet 400 Oral two times a day  buMETAnide Infusion 1 IV Continuous <Continuous>  cefepime   IVPB 2000 IV Intermittent every 8 hours  cefepime   IVPB     cetirizine 10 Oral daily  chlorhexidine 2% Cloths 1 Topical daily  clopidogrel Tablet 75 Oral daily  dexAMETHasone     Tablet 6 Oral daily  doxycycline IVPB     doxycycline IVPB 100 IV Intermittent every 12 hours  enoxaparin Injectable 100 SubCutaneous every 12 hours  glucagon  Injectable 1 IntraMuscular once  insulin glargine Injectable (LANTUS) 27 SubCutaneous at bedtime  insulin lispro (ADMELOG) corrective regimen sliding scale  SubCutaneous three times a day before meals  insulin lispro Injectable (ADMELOG) 9 SubCutaneous three times a day before meals  losartan 25 Oral daily  melatonin 5 Oral at bedtime  metoprolol tartrate 12.5 Oral every 6 hours  nicotine -  14 mG/24Hr(s) Patch 1 Transdermal every 24 hours  pantoprazole  Injectable 40 IV Push with breakfast  regadenoson Injectable 0.4 IV Push once      WEIGHT  Weight (kg): 96 (12-27-24 @ 01:00)  Creatinine: 1.2 mg/dL (01-07-25 @ 06:08)  Creatinine: 1.1 mg/dL (01-06-25 @ 21:35)      ANTIBIOTICS:  cefepime   IVPB 2000 milliGRAM(s) IV Intermittent every 8 hours  cefepime   IVPB      doxycycline IVPB      doxycycline IVPB 100 milliGRAM(s) IV Intermittent every 12 hours      All available historical records have been reviewed

## 2025-01-07 NOTE — PROGRESS NOTE ADULT - SUBJECTIVE AND OBJECTIVE BOX
Patient is a 44y old  Male who presents with a chief complaint of MI (04 Jan 2025 12:40)        SUBJECTIVE:  Patient currently on hfnc, tachypnic, endorsing worsening cough      PHYSICAL EXAM  Vital Signs Last 24 Hrs  T(C): 36.7 (07 Jan 2025 00:08), Max: 36.7 (06 Jan 2025 14:45)  T(F): 98 (07 Jan 2025 00:08), Max: 98 (06 Jan 2025 14:45)  HR: 69 (07 Jan 2025 05:24) (61 - 85)  BP: 105/54 (07 Jan 2025 05:24) (87/50 - 113/58)  BP(mean): 75 (07 Jan 2025 05:24) (61 - 80)  RR: 30 (07 Jan 2025 05:24) (18 - 30)  SpO2: 92% (07 Jan 2025 05:24) (86% - 98%)    Parameters below as of 07 Jan 2025 00:08  Patient On (Oxygen Delivery Method): nasal cannula, high flow  O2 Flow (L/min): 50  O2 Concentration (%): 65    CONSTITUTIONAL:  ill appearing    ENT:   Airway patent,   No thrush    CARDIAC:   Normal rate,   regular rhythm.    no edema      RESPIRATORY:   NO Wheezing  Normal chest expansion  tachypneic,  No use of accessory muscles    GASTROINTESTINAL:  Abdomen soft,   non-tender,   no guarding,   + BS    MUSCULOSKELETAL:   range of motion is not limited,  no clubbing, cyanosis    NEUROLOGICAL:   Alert and oriented   no motor or deficits.    SKIN:   Skin normal color for race,   warm, dry   No evidence of rash.      01-06-25 @ 07:01  -  01-07-25 @ 07:00  --------------------------------------------------------  IN:    Bumetanide: 37.5 mL    IV PiggyBack: 455 mL  Total IN: 492.5 mL    OUT:    Voided (mL): 4800 mL  Total OUT: 4800 mL    Total NET: -4307.5 mL          LABS:                          11.9   16.70 )-----------( 390      ( 07 Jan 2025 06:08 )             35.9                                               01-06    132[L]  |  99  |  22[H]  ----------------------------<  129[H]  4.7   |  21  |  1.1    Ca    8.1[L]      06 Jan 2025 21:35  Phos  4.7     01-06  Mg     2.0     01-06    TPro  6.5  /  Alb  2.9[L]  /  TBili  0.3  /  DBili  x   /  AST  32  /  ALT  27  /  AlkPhos  177[H]  01-06                                             Urinalysis Basic - ( 06 Jan 2025 21:35 )    Color: x / Appearance: x / SG: x / pH: x  Gluc: 129 mg/dL / Ketone: x  / Bili: x / Urobili: x   Blood: x / Protein: x / Nitrite: x   Leuk Esterase: x / RBC: x / WBC x   Sq Epi: x / Non Sq Epi: x / Bacteria: x                                                  LIVER FUNCTIONS - ( 06 Jan 2025 05:43 )  Alb: 2.9 g/dL / Pro: 6.5 g/dL / ALK PHOS: 177 U/L / ALT: 27 U/L / AST: 32 U/L / GGT: x                                                                                            ABG - ( 06 Jan 2025 12:12 )  pH, Arterial: 7.51  pH, Blood: x     /  pCO2: 29    /  pO2: 68    / HCO3: 23    / Base Excess: 1.0   /  SaO2: 97.2                MEDICATIONS  (STANDING):  aMIOdarone    Tablet 400 milliGRAM(s) Oral two times a day  buMETAnide Infusion 0.5 mG/Hr (2.5 mL/Hr) IV Continuous <Continuous>  cefepime   IVPB 2000 milliGRAM(s) IV Intermittent every 8 hours  cefepime   IVPB      cetirizine 10 milliGRAM(s) Oral daily  chlorhexidine 2% Cloths 1 Application(s) Topical daily  clopidogrel Tablet 75 milliGRAM(s) Oral daily  doxycycline IVPB      doxycycline IVPB 100 milliGRAM(s) IV Intermittent once  doxycycline IVPB 100 milliGRAM(s) IV Intermittent every 12 hours  enoxaparin Injectable 100 milliGRAM(s) SubCutaneous every 12 hours  glucagon  Injectable 1 milliGRAM(s) IntraMuscular once  insulin glargine Injectable (LANTUS) 27 Unit(s) SubCutaneous at bedtime  insulin lispro (ADMELOG) corrective regimen sliding scale   SubCutaneous three times a day before meals  insulin lispro Injectable (ADMELOG) 9 Unit(s) SubCutaneous three times a day before meals  losartan 25 milliGRAM(s) Oral daily  melatonin 5 milliGRAM(s) Oral at bedtime  metoprolol tartrate 12.5 milliGRAM(s) Oral every 6 hours  nicotine -  14 mG/24Hr(s) Patch 1 Patch Transdermal every 24 hours  pantoprazole  Injectable 40 milliGRAM(s) IV Push with breakfast  regadenoson Injectable 0.4 milliGRAM(s) IV Push once    MEDICATIONS  (PRN):  acetaminophen     Tablet .. 650 milliGRAM(s) Oral every 6 hours PRN Mild Pain (1 - 3)  albuterol/ipratropium for Nebulization 3 milliLiter(s) Nebulizer every 6 hours PRN Shortness of Breath and/or Wheezing  benzocaine/menthol Lozenge 1 Lozenge Oral every 3 hours PRN Sore Throat  benzonatate 100 milliGRAM(s) Oral every 8 hours PRN Cough  guaifenesin/dextromethorphan Oral Liquid 10 milliLiter(s) Oral every 4 hours PRN Cough      Patient is a 44y old  Male who presents with a chief complaint of MI (04 Jan 2025 12:40)        SUBJECTIVE:  Patient currently on hfnc, tachypneic endorsing worsening cough      PHYSICAL EXAM  Vital Signs Last 24 Hrs  T(C): 36.7 (07 Jan 2025 00:08), Max: 36.7 (06 Jan 2025 14:45)  T(F): 98 (07 Jan 2025 00:08), Max: 98 (06 Jan 2025 14:45)  HR: 69 (07 Jan 2025 05:24) (61 - 85)  BP: 105/54 (07 Jan 2025 05:24) (87/50 - 113/58)  BP(mean): 75 (07 Jan 2025 05:24) (61 - 80)  RR: 30 (07 Jan 2025 05:24) (18 - 30)  SpO2: 92% (07 Jan 2025 05:24) (86% - 98%)    Parameters below as of 07 Jan 2025 00:08  Patient On (Oxygen Delivery Method): nasal cannula, high flow  O2 Flow (L/min): 50  O2 Concentration (%): 65    CONSTITUTIONAL:  ill appearing    ENT:   Airway patent,   No thrush    CARDIAC:   Normal rate,   regular rhythm.    no edema      RESPIRATORY:   NO Wheezing  Normal chest expansion  tachypneic,  No use of accessory muscles    GASTROINTESTINAL:  Abdomen soft,   non-tender,   no guarding,   + BS    MUSCULOSKELETAL:   range of motion is not limited,  no clubbing, cyanosis    NEUROLOGICAL:   Alert and oriented   no motor or deficits.    SKIN:   Skin normal color for race,   warm, dry   No evidence of rash.      01-06-25 @ 07:01  -  01-07-25 @ 07:00  --------------------------------------------------------  IN:    Bumetanide: 37.5 mL    IV PiggyBack: 455 mL  Total IN: 492.5 mL    OUT:    Voided (mL): 4800 mL  Total OUT: 4800 mL    Total NET: -4307.5 mL          LABS:                          11.9   16.70 )-----------( 390      ( 07 Jan 2025 06:08 )             35.9                                               01-06    132[L]  |  99  |  22[H]  ----------------------------<  129[H]  4.7   |  21  |  1.1    Ca    8.1[L]      06 Jan 2025 21:35  Phos  4.7     01-06  Mg     2.0     01-06    TPro  6.5  /  Alb  2.9[L]  /  TBili  0.3  /  DBili  x   /  AST  32  /  ALT  27  /  AlkPhos  177[H]  01-06                                             Urinalysis Basic - ( 06 Jan 2025 21:35 )    Color: x / Appearance: x / SG: x / pH: x  Gluc: 129 mg/dL / Ketone: x  / Bili: x / Urobili: x   Blood: x / Protein: x / Nitrite: x   Leuk Esterase: x / RBC: x / WBC x   Sq Epi: x / Non Sq Epi: x / Bacteria: x                                                  LIVER FUNCTIONS - ( 06 Jan 2025 05:43 )  Alb: 2.9 g/dL / Pro: 6.5 g/dL / ALK PHOS: 177 U/L / ALT: 27 U/L / AST: 32 U/L / GGT: x                                                                                            ABG - ( 06 Jan 2025 12:12 )  pH, Arterial: 7.51  pH, Blood: x     /  pCO2: 29    /  pO2: 68    / HCO3: 23    / Base Excess: 1.0   /  SaO2: 97.2                MEDICATIONS  (STANDING):  aMIOdarone    Tablet 400 milliGRAM(s) Oral two times a day  buMETAnide Infusion 0.5 mG/Hr (2.5 mL/Hr) IV Continuous <Continuous>  cefepime   IVPB 2000 milliGRAM(s) IV Intermittent every 8 hours  cefepime   IVPB      cetirizine 10 milliGRAM(s) Oral daily  chlorhexidine 2% Cloths 1 Application(s) Topical daily  clopidogrel Tablet 75 milliGRAM(s) Oral daily  doxycycline IVPB      doxycycline IVPB 100 milliGRAM(s) IV Intermittent once  doxycycline IVPB 100 milliGRAM(s) IV Intermittent every 12 hours  enoxaparin Injectable 100 milliGRAM(s) SubCutaneous every 12 hours  glucagon  Injectable 1 milliGRAM(s) IntraMuscular once  insulin glargine Injectable (LANTUS) 27 Unit(s) SubCutaneous at bedtime  insulin lispro (ADMELOG) corrective regimen sliding scale   SubCutaneous three times a day before meals  insulin lispro Injectable (ADMELOG) 9 Unit(s) SubCutaneous three times a day before meals  losartan 25 milliGRAM(s) Oral daily  melatonin 5 milliGRAM(s) Oral at bedtime  metoprolol tartrate 12.5 milliGRAM(s) Oral every 6 hours  nicotine -  14 mG/24Hr(s) Patch 1 Patch Transdermal every 24 hours  pantoprazole  Injectable 40 milliGRAM(s) IV Push with breakfast  regadenoson Injectable 0.4 milliGRAM(s) IV Push once    MEDICATIONS  (PRN):  acetaminophen     Tablet .. 650 milliGRAM(s) Oral every 6 hours PRN Mild Pain (1 - 3)  albuterol/ipratropium for Nebulization 3 milliLiter(s) Nebulizer every 6 hours PRN Shortness of Breath and/or Wheezing  benzocaine/menthol Lozenge 1 Lozenge Oral every 3 hours PRN Sore Throat  benzonatate 100 milliGRAM(s) Oral every 8 hours PRN Cough  guaifenesin/dextromethorphan Oral Liquid 10 milliLiter(s) Oral every 4 hours PRN Cough

## 2025-01-07 NOTE — PROGRESS NOTE ADULT - ASSESSMENT
IMPRESSION:    Acute hypoxemic respiratory failure on hfnc  HFREF with severely reduced EF   VTACH s/p cardioversion   LV thrombus   NSTEMI   COVID pneumonia       PLAN:    CNS: MS adequate.     HEENT: Oral care    PULMONARY:  CXR with persistent bilateral infiltrates. On hfnc 50/60 saturating 90%, adust o2 to keep sat >90%. can continue duonebs prn. Would repeat CT chest, if patient tolerates can start bipap    CARDIOVASCULAR:  Diuretics per cardiology; monitor i/o    GI: GI prophylaxis.  Feeding as tolerated.     RENAL:  Follow up lytes.  Correct as needed.      INFECTIOUS DISEASE: Afebrile. blood culture negative. COVID positive on admission.  MRSA negative, Inflammatory markers elevated trending up, abx per ID. obtain RVP    HEMATOLOGICAL:  On therapeutic AC. PTT therapeutic.     ENDOCRINE:  Follow up FS.  Insulin protocol if needed.     MUSCULOSKELETAL: OOBTC as tolerated    Cardiac step down, consider reupgrade to CCU.     IMPRESSION:    Acute hypoxemic respiratory failure on hfnc  HFREF with severely reduced EF   VTACH s/p cardioversion   LV thrombus   NSTEMI   COVID pneumonia       PLAN:    CNS: MS adequate.     HEENT: Oral care    PULMONARY:  CXR with persistent bilateral infiltrates. Continue HFNC for now; Goal spo2 more than 92%. CT chest today if possible. Was never started on Dexamethasone for COVID; clarify with cardiology if safe; late MI presentation. If deemed safe then would recommend Dexamethasone 20mg for 5 days then 10 for 5 days.     CARDIOVASCULAR:  BNP on admission elevated. Check IVC daily. Diurese accordingly. Respiratory status had improved previously in CCU with aggressive diuresis. Cardiology following.     GI: GI prophylaxis.  Feeding as tolerated.     RENAL:  Follow up lytes.  Correct as needed.      INFECTIOUS DISEASE: No fevers. Cough productive of sputum. Send cultures. Nasal MRSA negative. ID following. Abx per their recommendations. CRP noted to be more elevated.     HEMATOLOGICAL:  On therapeutic AC. No hemoptysis. No drop in hg.     ENDOCRINE:  Follow up FS.  Insulin protocol if needed.     MUSCULOSKELETAL: OOBTC as tolerated.     Patient in cardiac SDU; if worsening O2 requirements would suggest re-upgrade to CCU.

## 2025-01-07 NOTE — PROGRESS NOTE ADULT - ASSESSMENT
ASSESSMENT  44M with HTN, IDDM, HLD, active smoker (1/2 PPD x 28 yrs), hepatitis B (s/p treatment) presents with CC of chest pressure and SOB.      IMPRESSION  #Acute Hypoxemic Respiratory failure  #ARDS  - CT Chest No Cont (12.28.24 @ 16:56): IMPRESSION: Findings consistent with ARDS    #Pulmonary Edema  #NSTEMI  #VTach   #Acute HFrEF  - TTE 12/27 - EF 25-30%     #LV Thrombus  #COVID-19, recent infection   #DM II  #Hx of Hep B     #Obesity BMI (kg/m2): 32.2  #Abx allergy: No Known Drug Allergies    RECOMMENDATIONS  - CRP worsening, while procalcitonin < 0.25 -- ok with trial of dexamethasone dose as provided by pulm   - agree with cefepime 2g q 8 hours   - continue doxy 100 mg BID   - monitor O2     Please call or message on [employer] Teams if with any questions.  Spectra [phone]

## 2025-01-07 NOTE — PROGRESS NOTE ADULT - ASSESSMENT
45 YO M with PMHx of HTN, IDDM, HLD, active smoker (1/2 PPD x 28 yrs), hepatitis B (s/p treatment) presented with CC chest pressure and SOB. Patient was admitted for late presentation of anterolateral MI pedning [facility] after respiratory status optimization.     OVERNIGHT EVENTS:   1/6: Patient has dyspnea ; cough with reddish sputum production. Saturating 90-91% on 4 L NC . Increased oxygen requirement. Bumex drip started 0.5mg/hr.   1/7: HFNC setting at 50L/65% ; satting 95-97% ; RR 22 ; Bumex drip increased to 1 mg/hr. Albumin 200ml q 12 hours for 2 doses started. Steroids started       # Late presentation anterior MI   # Acute HFrEF, ICM, EF 25-30%  # Sustained monomorphic slow Vtach; 12 min overnight on 12/28 with rate ~ 140s  # LV thrombus  # HTN  - discontinued ASA 81 QD   - On plavix 75mg qd, Lovenox 100mg q12  - cw losartan 25 mg daily  - cw Metoprolol 12.5mg PO Q6  - s/p lidocaine   - c/w amiodarone 400 mg BID PO. today day 6  - Viability study showed nonviable myocardium  - EP following for VT:   -1/6:  80 mg Lasix given ;  Desaturation noted ; CXR bilateral opacities ; >> Patient does not want Bipap >> WIll try HFNC.   - ABG : Respiratory alkalosis. ; PaO2 68 CO2 29   - Started on Bumex 0.5 mg/hr. >> increased to 1 mg /hr.   - Rule out pneumonia : Started on Cefepime and Azithro>> changed to Doxy.    - Procal 0.22   - sputum culture ; Legionella and strept> Pending   - MRSA swab > Negative   - Pulm consulted; minimal pleural effusion ; not Tappable.     - Consulted HF team , following .   - ID consulted appreciated  - : steroids started    - Monitor intake and output   - BMP BID   - Keep K > 4.0 and Mag > 2.0.     #HLD  #IDDM w/ hyperglycemia  -TSH 0.99  -A1c 10.8 - DM control  -  - on lantus 27 and lispro of 9.    DVT PPX: Lovenox  Diet: DASH CC with fluid restriction  DISPO: Cardiac SDU    PENDING:     BMP at 8 pm ; Awaiting Cardiac Cath ( Possible ?? Friday ; to be decided yet ; Pending improvement of resp status)   Full code    45 YO M with PMHx of HTN, IDDM, HLD, active smoker (1/2 PPD x 28 yrs), hepatitis B (s/p treatment) presented with CC chest pressure and SOB. Patient was admitted for late presentation of anterolateral MI pedning [facility] after respiratory status optimization.     OVERNIGHT EVENTS:   1/6: Patient has dyspnea ; cough with reddish sputum production. Saturating 90-91% on 4 L NC . Increased oxygen requirement. Bumex drip started 0.5mg/hr.   1/7: HFNC setting at 50L/65% ; satting 95-97% ; RR 22 ; Bumex drip increased to 1 mg/hr. Albumin 200ml q 12 hours for 2 doses started. Steroids started     # Possible ARDS   # Late presentation anterior MI   # Acute HFrEF, ICM, EF 25-30%  # Sustained monomorphic slow Vtach; 12 min overnight on 12/28 with rate ~ 140s  # LV thrombus  # HTN  - discontinued ASA 81 QD   - On plavix 75mg qd, Lovenox 100mg q12  - cw losartan 25 mg daily  - cw Metoprolol 12.5mg PO Q6  - s/p lidocaine   - c/w amiodarone 400 mg BID PO. today day 6  - Viability study showed nonviable myocardium  - EP following for VT:   -1/6:  80 mg Lasix given ;  Desaturation noted ; CXR bilateral opacities ; >> Patient does not want Bipap >> WIll try HFNC.   - ABG : Respiratory alkalosis. ; PaO2 68 CO2 29   - Started on Bumex 0.5 mg/hr. >> increased to 1 mg /hr.   - Rule out pneumonia : Started on Cefepime and Azithro>> changed to Doxy.    - Procal 0.22   - sputum culture ; Legionella and strept> Pending   - MRSA swab > Negative   - Pulm consulted; minimal pleural effusion ; not Tappable.     - Consulted HF team , following .   - ID consulted appreciated  - : steroids started ARDS dose ; Decadron 20 mg IV for 5 days and 10 mg IV for next 5 days.    - Monitor intake and output   - BMP BID   - Keep K > 4.0 and Mag > 2.0.     #HLD  #IDDM w/ hyperglycemia  -TSH 0.99  -A1c 10.8 - DM control  -  - on lantus 27 and lispro of 9.>> Might need increased dosing since on Steroids now.     DVT PPX: Lovenox  Diet: DASH CC with fluid restriction  DISPO: Cardiac SDU    PENDING:     BMP at 8 pm ; Awaiting Cardiac Cath ( Possible ?? Friday ; to be decided yet ; Pending improvement of resp status)   Full code

## 2025-01-07 NOTE — CONSULT NOTE ADULT - SUBJECTIVE AND OBJECTIVE BOX
Outpt cardiologist:    HPI:  44M with HTN, IDDM, HLD, active smoker (1/2 PPD x 28 yrs), hepatitis B (s/p treatment) presents with CC of chest pressure and SOB. 2 weeks ago, he started having cough w/ yellow and white sputum runny nose, generalized body aches. Then started developing REGAN about a week ago along with left sided chest pressure, initially about 6-7/10. The chest pressure/SOB are worse with laying flat, improve with leaning forward. No nausea vomiting, reports loose BM 1-2 per day x 2 weeks. Reports burning at end of urination about a week ago which has resolved.  His last alcoholic drink was 3 weeks ago. Also uses Timothy Dust occasionally, last use few weeks ago. Denies any injected drugs or cocaine.  Has not taken his meds including insulin in the last 2 weeks due to feeling sick and not eating much.    Triage VS: /98, , RR 26, SpO2 92% on RA, T 98.6F.  Labs: WBC 11.77k, D-dimer 543, CRP 91, HS troponin 400, pro-BNP 4126.  UA w/ 100 protein, 500 glucose, otherwise negative.  SARS-CoV-2 positive.  CXR w/ bilateral opacities.    ED interventions: ASA 325mg, ticagrelor 180mg, DuoNeb, ketorolac, 125mg IV methylprednisolone, 80mg IV furosemide. Was also placed on BIPAP which he did not tolerate (made him very anxious), despite repeated counseling. (27 Dec 2024 00:27)      ---  cardio fellow additional notes:    Patient was diuresed yesterday with bumex gtt and is net negative 4.3 liters today. He does feel much better reports improvement in SOB and can lie flat but he is on HFNC at 50-65%. Has a productive cough with brownish sputum. his IVC was 1.4 >50% collapsibility. CXR is only slightly better inflammatory markers high consistent with covid PNA, he was seen by the pulmonologist and they recommended starting high dose dexamethasone for covid pneumonia. He now has an PATIENCE. BUN:Cr >20.      PAST MEDICAL & SURGICAL HISTORY  HTN (hypertension)    Hepatitis B    DM (diabetes mellitus)        FAMILY HISTORY:  FAMILY HISTORY:      SOCIAL HISTORY:  Social History:      ALLERGIES:  Seafood (Urticaria)  No Known Drug Allergies  shellfish (Urticaria; Rash; Short breath)      MEDICATIONS:  albumin human 25% IVPB 200 milliLiter(s) IV Intermittent every 12 hours  aMIOdarone    Tablet 400 milliGRAM(s) Oral two times a day  buMETAnide Infusion 1 mG/Hr (5 mL/Hr) IV Continuous <Continuous>  cefepime   IVPB      cefepime   IVPB 2000 milliGRAM(s) IV Intermittent every 8 hours  cetirizine 10 milliGRAM(s) Oral daily  chlorhexidine 2% Cloths 1 Application(s) Topical daily  clopidogrel Tablet 75 milliGRAM(s) Oral daily  doxycycline IVPB      doxycycline IVPB 100 milliGRAM(s) IV Intermittent every 12 hours  enoxaparin Injectable 100 milliGRAM(s) SubCutaneous every 12 hours  glucagon  Injectable 1 milliGRAM(s) IntraMuscular once  insulin glargine Injectable (LANTUS) 27 Unit(s) SubCutaneous at bedtime  insulin lispro (ADMELOG) corrective regimen sliding scale   SubCutaneous three times a day before meals  insulin lispro Injectable (ADMELOG) 9 Unit(s) SubCutaneous three times a day before meals  losartan 25 milliGRAM(s) Oral daily  melatonin 5 milliGRAM(s) Oral at bedtime  metoprolol tartrate 12.5 milliGRAM(s) Oral every 6 hours  nicotine -  14 mG/24Hr(s) Patch 1 Patch Transdermal every 24 hours  pantoprazole  Injectable 40 milliGRAM(s) IV Push with breakfast  regadenoson Injectable 0.4 milliGRAM(s) IV Push once    PRN:  acetaminophen     Tablet .. 650 milliGRAM(s) Oral every 6 hours PRN  albuterol/ipratropium for Nebulization 3 milliLiter(s) Nebulizer every 6 hours PRN  benzocaine/menthol Lozenge 1 Lozenge Oral every 3 hours PRN  benzonatate 100 milliGRAM(s) Oral every 8 hours PRN  guaifenesin/dextromethorphan Oral Liquid 10 milliLiter(s) Oral every 4 hours PRN      HOME MEDICATIONS:  Home Medications:  atorvastatin 40 mg oral tablet: 1 tab(s) orally once a day (at bedtime) (27 Dec 2024 14:48)  Insulin Glargine: 85 unit(s) subcutaneous once a day (27 Dec 2024 14:52)  Insulin Lispro: 40 unit(s) subcutaneous once a day (27 Dec 2024 14:57)  Jardiance 25 mg oral tablet: 1 tab(s) orally once a day (27 Dec 2024 14:59)  losartan 25 mg oral tablet: 1 tab(s) orally once a day (27 Dec 2024 14:59)  metFORMIN 1000 mg oral tablet: 1 tab(s) orally 2 times a day (27 Dec 2024 14:59)  Ozempic 2 mg/1.5 mL (1 mg dose) subcutaneous solution: 2 milligram(s) subcutaneous once a week (27 Dec 2024 14:59)      VITALS:   T(F): 98.9 ( @ 07:33), Max: 98.9 ( @ 20:05)  HR: 64 ( @ 07:33) (61 - 85)  BP: 90/55 ( @ 07:33) (87/50 - 137/86)  BP(mean): 67 ( @ 07:33) (61 - 107)  RR: 20 ( @ 07:33) (18 - 31)  SpO2: 98% ( @ 07:33) (86% - 98%)    I&O's Summary    2025 07:  -  2025 07:00  --------------------------------------------------------  IN: 492.5 mL / OUT: 4800 mL / NET: -4307.5 mL    2025 07:  -  2025 11:25  --------------------------------------------------------  IN: 160 mL / OUT: 675 mL / NET: -515 mL          PHYSICAL EXAM:  Cardio: regular, S1, S2, no murmur  Pulm: B/L crackles   Abdomen: Soft, non-tender, non-distended.   Extremities: +ve trace edema  Neuro: A&O x3.moving all extremities    LABS:                        11.9   16.70 )-----------( 390      ( 2025 06:08 )             35.9     01-07    134[L]  |  95[L]  |  25[H]  ----------------------------<  151[H]  4.3   |  22  |  1.2    Ca    8.2[L]      2025 06:08  Phos  4.7     -  Mg     1.9     -    TPro  6.4  /  Alb  2.9[L]  /  TBili  0.2  /  DBili  x   /  AST  29  /  ALT  29  /  AlkPhos  182[H]  -              Troponin trend:      12- Chol 226[H] LDL -- HDL 50 Trig 84      RADIOLOGY:  -CXR:    IMPRESSION: Increased bibasilar pleural effusion. Unchanged diffuse   bilateral patchy opacities.    --- End of Report ---          AL GARCIA MD; Resident Radiologist  This document has been electronically signed.  KINSEY ACOSTA MD; Attending Radiologist  This document has been electronically signed. 2025  8:17AM  -TTE:  Summary:   1. Endocardial visualization was enhanced with intravenous echo contrast.   2. Left ventricular ejection fraction, by visual estimation, is 25 to   30%.  3. Severely decreased global left ventricular systolic function.   4. Multiple left ventricular regional wall motion abnormalities exist.   See wall motion findings.   5. Spectral Doppler shows pseudonormal pattern of left ventricular   myocardial filling (Grade II diastolic dysfunction).   6. Small, fixed, anteroapical left ventricular thrombus.   7. Right ventricular apical hypokinesis.   8. Mild mitral regurgitation.   9. Findings discussed with CCU team.          -CCTA:  -STRESS TEST:  -CATHETERIZATION:  -OTHER:  IMPRESSION:  1.  Using sestamibi as a surrogate marker of viability,sestamibi uptake   is visualized in the inferior wall, lateral wall, apical aspect of the   septum and a small portion of the basal anterior wall demonstrating   viability.  2. No definite sestamibi uptake in apex, apical 2/3 of the anterior wall,   and apicoseptal region suggesting no viability. Clinical correlation is   suggested, and perhaps additional markers of viability may be useful.    2.  Dilated left ventricle with marked global hypokinesis. No thickening   of the apex, distal (apical) 2/3 of the anterior wall and apicoseptal   wall  . Inferior, lateral, inferoseptal and a small portion of   anterobasal wall do thicken    3 .Left ventricular ejection fraction was calculated to be 24% which is   low. Wall motion analysis suggests ejection fraction of 20%.   Echocardiographic estimated ejection fraction was 25-30% on 2024    --- End of Report ---    EC Lead ECG:   Ventricular Rate 73 BPM    Atrial Rate 73 BPM    P-R Interval 190 ms    QRS Duration 106 ms    Q-T Interval 496 ms    QTC Calculation(Bazett) 546 ms    P Axis 12 degrees    R Axis 106 degrees    T Axis 62 degrees    Diagnosis Line Normal sinus rhythm  Anterolateral infarct , age undetermined  Prolonged QT  Abnormal ECG    Confirmed by Snadra Bush MD ([id number]) on 2025 9:11:02 AM ( @ 07:38)      TELEMETRY EVENTS:  no events

## 2025-01-07 NOTE — PROGRESS NOTE ADULT - SUBJECTIVE AND OBJECTIVE BOX
24H events:    Patient is a 44y old Male who presents with a chief complaint of MI (04 Jan 2025 12:40)    Primary diagnosis of ACS (acute coronary syndrome)    Today is hospital day 12d. This morning patient was seen and examined at bedside, resting comfortably in bed.    No acute or major events overnight.    Code Status: FULL CODE       PAST MEDICAL & SURGICAL HISTORY  HTN (hypertension)    Hepatitis B    DM (diabetes mellitus)      SOCIAL HISTORY:  Social History:      ALLERGIES:  Seafood (Urticaria)  No Known Drug Allergies  shellfish (Urticaria; Rash; Short breath)    MEDICATIONS:  STANDING MEDICATIONS  aMIOdarone    Tablet 400 milliGRAM(s) Oral two times a day  azithromycin  IVPB      azithromycin  IVPB 500 milliGRAM(s) IV Intermittent once  cefepime   IVPB 2000 milliGRAM(s) IV Intermittent every 8 hours  cefepime   IVPB      chlorhexidine 2% Cloths 1 Application(s) Topical daily  clopidogrel Tablet 75 milliGRAM(s) Oral daily  enoxaparin Injectable 100 milliGRAM(s) SubCutaneous every 12 hours  glucagon  Injectable 1 milliGRAM(s) IntraMuscular once  guaifenesin/dextromethorphan Oral Liquid 10 milliLiter(s) Oral every 4 hours  insulin glargine Injectable (LANTUS) 27 Unit(s) SubCutaneous at bedtime  insulin lispro (ADMELOG) corrective regimen sliding scale   SubCutaneous three times a day before meals  insulin lispro Injectable (ADMELOG) 9 Unit(s) SubCutaneous three times a day before meals  losartan 25 milliGRAM(s) Oral daily  melatonin 5 milliGRAM(s) Oral at bedtime  metoprolol tartrate 12.5 milliGRAM(s) Oral every 6 hours  nicotine -  14 mG/24Hr(s) Patch 1 Patch Transdermal every 24 hours  pantoprazole  Injectable 40 milliGRAM(s) IV Push with breakfast  regadenoson Injectable 0.4 milliGRAM(s) IV Push once    PRN MEDICATIONS  acetaminophen     Tablet .. 650 milliGRAM(s) Oral every 6 hours PRN  albuterol/ipratropium for Nebulization 3 milliLiter(s) Nebulizer every 6 hours PRN  benzocaine/menthol Lozenge 1 Lozenge Oral every 3 hours PRN  benzonatate 100 milliGRAM(s) Oral every 8 hours PRN    VITALS:   T(F): 97.6  HR: 76  BP: 112/62  RR: 19  SpO2: 93%    PHYSICAL EXAM:  GENERAL: Tachypnea       NECK:  (x  ) Supple     (  ) neck stiffness     (  ) nuchal rigidity     (  )  no JVD     (  ) JVD present ( -- cm)    HEART:  Rate -->     (x  ) normal rate     (  ) bradycardic     (  ) tachycardic  Rhythm -->     ( x ) regular     (  ) regularly irregular     (  ) irregularly irregular  Murmurs -->     ( x ) normal s1s2     (  ) systolic murmur     (  ) diastolic murmur     (  ) continuous murmur      (  ) S3 present     (  ) S4 present    LUNGS:   (  )Unlabored respirations     ( x ) tachypnea  (  ) B/L air entry     (  x) decreased breath sounds in:  (location Bilateral     )    (  ) no adventitious sound     ( x ) crackles     (  ) wheezing      (  ) rhonchi      (specify location:       )  ( x ) chest wall tenderness (specify location:  Left diaphrgmatic area      )    ABDOMEN:   ( x) Soft     (  ) tense   |   ( x ) nondistended     (  ) distended   |   (x  ) +BS     (  ) hypoactive bowel sounds     (  ) hyperactive bowel sounds  (  ) nontender     (  ) RUQ tenderness     (  ) RLQ tenderness     (  ) LLQ tenderness     (  ) epigastric tenderness     (  ) diffuse tenderness  (  ) Splenomegaly      (  ) Hepatomegaly      (  ) Jaundice     (  ) ecchymosis     EXTREMITIES:  (  ) Normal     (  ) Rash     (  ) ecchymosis     (  ) varicose veins      ( x ) pitting edema   +2  (  ) non-pitting edema   (  ) ulceration     (  ) gangrene:     (location:     )    NERVOUS SYSTEM:    (  x) A&Ox3     (  ) confused     (  ) lethargic  CN II-XII:     (  ) Intact     (  ) deficits found     (Specify:     )   Upper extremities:     (  ) no sensorimotor deficits     (  ) weakness     (  ) loss of proprioception/vibration     (  ) loss of touch/temperature (specify:    )  Lower extremities:     (  ) no sensorimotor deficits     (  ) weakness     (  ) loss of proprioception/vibration     (  ) loss of touch/temperature (specify:    )    SKIN:   (  ) No rashes or lesions     (  ) maculopapular rash     (  ) pustules     (  ) vesicles     (  ) ulcer     (  ) ecchymosis     (specify location:     )    LABS:             Labs:                        11.9   16.70 )-----------( 390      ( 07 Jan 2025 06:08 )             35.9     01-07    134[L]  |  95[L]  |  25[H]  ----------------------------<  151[H]  4.3   |  22  |  1.2    Ca    8.2[L]      07 Jan 2025 06:08  Phos  4.7     01-06  Mg     1.9     01-07    TPro  6.4  /  Alb  2.9[L]  /  TBili  0.2  /  DBili  x   /  AST  29  /  ALT  29  /  AlkPhos  182[H]  01-07      Culture - Blood (collected 12-26-24 @ 23:37)  Source: .Blood BLOOD  Final Report (01-01-25 @ 07:00):    No growth at 5 days    Culture - Blood (collected 12-26-24 @ 23:37)  Source: .Blood BLOOD  Final Report (01-01-25 @ 07:00):    No growth at 5 days      Creatinine: 1.2 mg/dL (01-07-25 @ 06:08)  Creatinine: 1.1 mg/dL (01-06-25 @ 21:35)  Creatinine: 0.8 mg/dL (01-06-25 @ 05:43)  Creatinine: 0.8 mg/dL (01-05-25 @ 05:45)  Creatinine: 0.7 mg/dL (01-04-25 @ 06:06)  Creatinine: 0.8 mg/dL (01-03-25 @ 05:25)    Procalcitonin: 0.22 ng/mL (01-06-25 @ 11:13)  Procalcitonin: 0.16 ng/mL (12-30-24 @ 10:40)    D-Dimer Assay, Quantitative: 658 ng/mL DDU (01-01-25 @ 15:49)    Ferritin: 598 ng/mL (12-31-24 @ 20:20)    C-Reactive Protein: 154.1 mg/L (01-06-25 @ 16:43)  C-Reactive Protein: 43.5 mg/L (01-04-25 @ 06:06)  C-Reactive Protein: 55.6 mg/L (01-03-25 @ 05:25)  C-Reactive Protein: 69.2 mg/L (01-02-25 @ 05:14)  C-Reactive Protein: 93.5 mg/L (01-01-25 @ 04:24)  C-Reactive Protein: 101.8 mg/L (12-31-24 @ 20:20)  C-Reactive Protein: 91.0 mg/L (12-26-24 @ 22:35)    Sedimentation Rate, Erythrocyte: 120 mm/Hr (12-26-24 @ 22:35)    WBC Count: 16.70 K/uL (01-07-25 @ 06:08)  WBC Count: 15.85 K/uL (01-06-25 @ 05:43)  WBC Count: 14.67 K/uL (01-05-25 @ 05:45)  WBC Count: 13.41 K/uL (01-04-25 @ 06:06)  WBC Count: 11.69 K/uL (01-03-25 @ 05:25)    SARS-CoV-2 Result: Detected (12-26-24 @ 22:09)    Lactate Dehydrogenase, Serum: 307 U/L (01-02-25 @ 05:14)    Alkaline Phosphatase: 182 U/L (01-07-25 @ 06:08)  Alkaline Phosphatase: 177 U/L (01-06-25 @ 05:43)  Alkaline Phosphatase: 168 U/L (01-05-25 @ 05:45)  Alkaline Phosphatase: 154 U/L (01-04-25 @ 06:06)  Alanine Aminotransferase (ALT/SGPT): 29 U/L (01-07-25 @ 06:08)  Alanine Aminotransferase (ALT/SGPT): 27 U/L (01-06-25 @ 05:43)  Alanine Aminotransferase (ALT/SGPT): 24 U/L (01-05-25 @ 05:45)  Alanine Aminotransferase (ALT/SGPT): 25 U/L (01-04-25 @ 06:06)  Aspartate Aminotransferase (AST/SGOT): 29 U/L (01-07-25 @ 06:08)  Aspartate Aminotransferase (AST/SGOT): 32 U/L (01-06-25 @ 05:43)  Aspartate Aminotransferase (AST/SGOT): 27 U/L (01-05-25 @ 05:45)  Aspartate Aminotransferase (AST/SGOT): 25 U/L (01-04-25 @ 06:06)  Bilirubin Total: 0.2 mg/dL (01-07-25 @ 06:08)  Bilirubin Total: 0.3 mg/dL (01-06-25 @ 05:43)  Bilirubin Total: 0.3 mg/dL (01-05-25 @ 05:45)  Bilirubin Total: 0.3 mg/dL (01-04-25 @ 06:06)

## 2025-01-07 NOTE — CONSULT NOTE ADULT - ASSESSMENT
43 YO M with PMHx of HTN, IDDM, HLD, active smoker (1/2 PPD x 28 yrs), hepatitis B (s/p treatment) presented with CC chest pressure and SOB. Patient was admitted for late presentation of anterolateral MI.    IMPRESSION  #Delayed presentation [place] MI  #HF with severely reduced ejection fraction  #IDDM  #Active smoker  #VT  #LV thrombus  #Covid-19 PNA    PLAN       43 YO M with PMHx of HTN, IDDM, HLD, active smoker (1/2 PPD x 28 yrs), hepatitis B (s/p treatment) presented with CC chest pressure and SOB. Patient was admitted for late presentation of anterolateral MI.    IMPRESSION  #Delayed presentation [place] MI  #HF with severely reduced ejection fraction  #IDDM  #Active smoker  #VT  #LV thrombus  #Covid-19 PNA    PLAN  - Hold IV diuretics   - Diuretic holiday for today  - switch to torsemide 20 PO in the am  - strict I and O   - weigh daily   - C/W lopressor 12.5 Q 6   - C/W losartan 25 QD   - Patient will need a left and right heart cath once his respiratory status improves

## 2025-01-08 LAB
ALBUMIN SERPL ELPH-MCNC: 4.2 G/DL — SIGNIFICANT CHANGE UP (ref 3.5–5.2)
ALP SERPL-CCNC: 195 U/L — HIGH (ref 30–115)
ALT FLD-CCNC: 29 U/L — SIGNIFICANT CHANGE UP (ref 0–41)
ANION GAP SERPL CALC-SCNC: 17 MMOL/L — HIGH (ref 7–14)
ANION GAP SERPL CALC-SCNC: 18 MMOL/L — HIGH (ref 7–14)
AST SERPL-CCNC: 26 U/L — SIGNIFICANT CHANGE UP (ref 0–41)
BASOPHILS # BLD AUTO: 0.03 K/UL — SIGNIFICANT CHANGE UP (ref 0–0.2)
BASOPHILS NFR BLD AUTO: 0.2 % — SIGNIFICANT CHANGE UP (ref 0–1)
BILIRUB SERPL-MCNC: 0.3 MG/DL — SIGNIFICANT CHANGE UP (ref 0.2–1.2)
BUN SERPL-MCNC: 36 MG/DL — HIGH (ref 10–20)
BUN SERPL-MCNC: 39 MG/DL — HIGH (ref 10–20)
CALCIUM SERPL-MCNC: 8 MG/DL — LOW (ref 8.4–10.5)
CALCIUM SERPL-MCNC: 8.4 MG/DL — SIGNIFICANT CHANGE UP (ref 8.4–10.5)
CHLORIDE SERPL-SCNC: 94 MMOL/L — LOW (ref 98–110)
CHLORIDE SERPL-SCNC: 97 MMOL/L — LOW (ref 98–110)
CO2 SERPL-SCNC: 18 MMOL/L — SIGNIFICANT CHANGE UP (ref 17–32)
CO2 SERPL-SCNC: 20 MMOL/L — SIGNIFICANT CHANGE UP (ref 17–32)
CREAT SERPL-MCNC: 1.2 MG/DL — SIGNIFICANT CHANGE UP (ref 0.7–1.5)
CREAT SERPL-MCNC: 1.3 MG/DL — SIGNIFICANT CHANGE UP (ref 0.7–1.5)
EGFR: 69 ML/MIN/1.73M2 — SIGNIFICANT CHANGE UP
EGFR: 76 ML/MIN/1.73M2 — SIGNIFICANT CHANGE UP
EOSINOPHIL # BLD AUTO: 0.01 K/UL — SIGNIFICANT CHANGE UP (ref 0–0.7)
EOSINOPHIL NFR BLD AUTO: 0.1 % — SIGNIFICANT CHANGE UP (ref 0–8)
GLUCOSE BLDC GLUCOMTR-MCNC: 341 MG/DL — HIGH (ref 70–99)
GLUCOSE BLDC GLUCOMTR-MCNC: 364 MG/DL — HIGH (ref 70–99)
GLUCOSE BLDC GLUCOMTR-MCNC: 368 MG/DL — HIGH (ref 70–99)
GLUCOSE BLDC GLUCOMTR-MCNC: 370 MG/DL — HIGH (ref 70–99)
GLUCOSE SERPL-MCNC: 323 MG/DL — HIGH (ref 70–99)
GLUCOSE SERPL-MCNC: 405 MG/DL — HIGH (ref 70–99)
HCT VFR BLD CALC: 34.8 % — LOW (ref 42–52)
HGB BLD-MCNC: 11.6 G/DL — LOW (ref 14–18)
IMM GRANULOCYTES NFR BLD AUTO: 1.4 % — HIGH (ref 0.1–0.3)
LYMPHOCYTES # BLD AUTO: 1.17 K/UL — LOW (ref 1.2–3.4)
LYMPHOCYTES # BLD AUTO: 6.3 % — LOW (ref 20.5–51.1)
MAGNESIUM SERPL-MCNC: 2.3 MG/DL — SIGNIFICANT CHANGE UP (ref 1.8–2.4)
MAGNESIUM SERPL-MCNC: 2.3 MG/DL — SIGNIFICANT CHANGE UP (ref 1.8–2.4)
MCHC RBC-ENTMCNC: 31.2 PG — HIGH (ref 27–31)
MCHC RBC-ENTMCNC: 33.3 G/DL — SIGNIFICANT CHANGE UP (ref 32–37)
MCV RBC AUTO: 93.5 FL — SIGNIFICANT CHANGE UP (ref 80–94)
MONOCYTES # BLD AUTO: 0.38 K/UL — SIGNIFICANT CHANGE UP (ref 0.1–0.6)
MONOCYTES NFR BLD AUTO: 2 % — SIGNIFICANT CHANGE UP (ref 1.7–9.3)
NEUTROPHILS # BLD AUTO: 16.69 K/UL — HIGH (ref 1.4–6.5)
NEUTROPHILS NFR BLD AUTO: 90 % — HIGH (ref 42.2–75.2)
NRBC # BLD: 0 /100 WBCS — SIGNIFICANT CHANGE UP (ref 0–0)
PLATELET # BLD AUTO: 392 K/UL — SIGNIFICANT CHANGE UP (ref 130–400)
PMV BLD: 9.4 FL — SIGNIFICANT CHANGE UP (ref 7.4–10.4)
POTASSIUM SERPL-MCNC: 4.6 MMOL/L — SIGNIFICANT CHANGE UP (ref 3.5–5)
POTASSIUM SERPL-MCNC: 4.7 MMOL/L — SIGNIFICANT CHANGE UP (ref 3.5–5)
POTASSIUM SERPL-SCNC: 4.6 MMOL/L — SIGNIFICANT CHANGE UP (ref 3.5–5)
POTASSIUM SERPL-SCNC: 4.7 MMOL/L — SIGNIFICANT CHANGE UP (ref 3.5–5)
PROT SERPL-MCNC: 7.3 G/DL — SIGNIFICANT CHANGE UP (ref 6–8)
RBC # BLD: 3.72 M/UL — LOW (ref 4.7–6.1)
RBC # FLD: 12.5 % — SIGNIFICANT CHANGE UP (ref 11.5–14.5)
SODIUM SERPL-SCNC: 131 MMOL/L — LOW (ref 135–146)
SODIUM SERPL-SCNC: 133 MMOL/L — LOW (ref 135–146)
WBC # BLD: 18.54 K/UL — HIGH (ref 4.8–10.8)
WBC # FLD AUTO: 18.54 K/UL — HIGH (ref 4.8–10.8)

## 2025-01-08 PROCEDURE — 99233 SBSQ HOSP IP/OBS HIGH 50: CPT

## 2025-01-08 PROCEDURE — 71045 X-RAY EXAM CHEST 1 VIEW: CPT | Mod: 26

## 2025-01-08 RX ORDER — TORSEMIDE 10 MG/1
40 TABLET ORAL DAILY
Refills: 0 | Status: DISCONTINUED | OUTPATIENT
Start: 2025-01-09 | End: 2025-01-10

## 2025-01-08 RX ORDER — INSULIN LISPRO 100/ML
VIAL (ML) SUBCUTANEOUS
Refills: 0 | Status: DISCONTINUED | OUTPATIENT
Start: 2025-01-08 | End: 2025-01-16

## 2025-01-08 RX ORDER — INSULIN LISPRO 100/ML
12 VIAL (ML) SUBCUTANEOUS
Refills: 0 | Status: DISCONTINUED | OUTPATIENT
Start: 2025-01-08 | End: 2025-01-09

## 2025-01-08 RX ORDER — TORSEMIDE 10 MG/1
20 TABLET ORAL ONCE
Refills: 0 | Status: COMPLETED | OUTPATIENT
Start: 2025-01-08 | End: 2025-01-08

## 2025-01-08 RX ADMIN — Medication 12.5 MILLIGRAM(S): at 11:52

## 2025-01-08 RX ADMIN — CHLORHEXIDINE GLUCONATE 1 APPLICATION(S): 1.2 RINSE ORAL at 06:24

## 2025-01-08 RX ADMIN — Medication 8: at 11:54

## 2025-01-08 RX ADMIN — TORSEMIDE 20 MILLIGRAM(S): 10 TABLET ORAL at 06:23

## 2025-01-08 RX ADMIN — INSULIN GLARGINE-YFGN 30 UNIT(S): 100 INJECTION, SOLUTION SUBCUTANEOUS at 21:36

## 2025-01-08 RX ADMIN — TORSEMIDE 20 MILLIGRAM(S): 10 TABLET ORAL at 09:47

## 2025-01-08 RX ADMIN — LOSARTAN POTASSIUM 25 MILLIGRAM(S): 100 TABLET, FILM COATED ORAL at 06:23

## 2025-01-08 RX ADMIN — Medication 9 UNIT(S): at 07:51

## 2025-01-08 RX ADMIN — Medication 5 MILLIGRAM(S): at 23:57

## 2025-01-08 RX ADMIN — AMIODARONE HYDROCHLORIDE 400 MILLIGRAM(S): 200 TABLET ORAL at 06:23

## 2025-01-08 RX ADMIN — CETIRIZINE HYDROCHLORIDE 10 MILLIGRAM(S): 5 SYRUP ORAL at 11:51

## 2025-01-08 RX ADMIN — ACETAMINOPHEN 650 MILLIGRAM(S): 80 SOLUTION/ DROPS ORAL at 11:50

## 2025-01-08 RX ADMIN — Medication 10: at 17:14

## 2025-01-08 RX ADMIN — Medication 10 MILLILITER(S): at 17:08

## 2025-01-08 RX ADMIN — ENOXAPARIN SODIUM 100 MILLIGRAM(S): 60 INJECTION INTRAVENOUS; SUBCUTANEOUS at 06:22

## 2025-01-08 RX ADMIN — Medication 12 UNIT(S): at 17:13

## 2025-01-08 RX ADMIN — Medication 12.5 MILLIGRAM(S): at 17:08

## 2025-01-08 RX ADMIN — AMIODARONE HYDROCHLORIDE 400 MILLIGRAM(S): 200 TABLET ORAL at 17:09

## 2025-01-08 RX ADMIN — Medication 12.5 MILLIGRAM(S): at 23:57

## 2025-01-08 RX ADMIN — Medication 12.5 MILLIGRAM(S): at 06:23

## 2025-01-08 RX ADMIN — Medication 100 MILLIGRAM(S): at 21:36

## 2025-01-08 RX ADMIN — Medication 100 MILLIGRAM(S): at 06:22

## 2025-01-08 RX ADMIN — PANTOPRAZOLE 40 MILLIGRAM(S): 40 TABLET, DELAYED RELEASE ORAL at 07:52

## 2025-01-08 RX ADMIN — Medication 100 MILLIGRAM(S): at 06:24

## 2025-01-08 RX ADMIN — Medication 12 UNIT(S): at 11:54

## 2025-01-08 RX ADMIN — ACETAMINOPHEN 650 MILLIGRAM(S): 80 SOLUTION/ DROPS ORAL at 07:15

## 2025-01-08 RX ADMIN — NICOTINE POLACRILEX 1 PATCH: 4 LOZENGE ORAL at 06:22

## 2025-01-08 RX ADMIN — NICOTINE POLACRILEX 1 PATCH: 4 LOZENGE ORAL at 19:21

## 2025-01-08 RX ADMIN — CLOPIDOGREL BISULFATE 75 MILLIGRAM(S): 75 TABLET, FILM COATED ORAL at 11:51

## 2025-01-08 RX ADMIN — ACETAMINOPHEN 650 MILLIGRAM(S): 80 SOLUTION/ DROPS ORAL at 06:23

## 2025-01-08 RX ADMIN — NICOTINE POLACRILEX 1 PATCH: 4 LOZENGE ORAL at 06:00

## 2025-01-08 RX ADMIN — Medication 100 MILLIGRAM(S): at 13:52

## 2025-01-08 RX ADMIN — Medication 10 MILLILITER(S): at 11:52

## 2025-01-08 RX ADMIN — ENOXAPARIN SODIUM 100 MILLIGRAM(S): 60 INJECTION INTRAVENOUS; SUBCUTANEOUS at 17:10

## 2025-01-08 RX ADMIN — ACETAMINOPHEN 650 MILLIGRAM(S): 80 SOLUTION/ DROPS ORAL at 23:58

## 2025-01-08 RX ADMIN — Medication 110 MILLIGRAM(S): at 06:22

## 2025-01-08 RX ADMIN — Medication 100 MILLILITER(S): at 00:14

## 2025-01-08 RX ADMIN — Medication 5: at 07:52

## 2025-01-08 RX ADMIN — Medication 100 MILLIGRAM(S): at 17:16

## 2025-01-08 NOTE — PROGRESS NOTE ADULT - ASSESSMENT
43 YO M with PMHx of HTN, IDDM, HLD, active smoker (1/2 PPD x 28 yrs), hepatitis B (s/p treatment) presented with CC chest pressure and SOB. Patient was admitted for late presentation of anterolateral MI.    IMPRESSION  #Delayed presentation [place] MI  #HF with severely reduced ejection fraction  #IDDM  #Active smoker  #VT  #LV thrombus  #Covid-19 PNA    PLAN  - C/W PO torsemide 40 mg PO QD  - strict I and O   - weigh daily   - C/W lopressor 12.5 Q 6   - C/W losartan 25 QD   - will hold off on adding Farxiga for now as he is going to need an angiogram   - Patient will need a left and right heart cath once his respiratory status improves and he is able to lie flat.

## 2025-01-08 NOTE — PHYSICAL THERAPY INITIAL EVALUATION ADULT - ADDITIONAL COMMENTS
Pt resides with mother in a private house, using 5 steps to enter and a flight to access bedroom. Pt used to be independent with overall functional mobility, able to ambulate using No AD at baseline

## 2025-01-08 NOTE — PHYSICAL THERAPY INITIAL EVALUATION ADULT - PERTINENT HX OF CURRENT PROBLEM, REHAB EVAL
43 YO M with PMHx of HTN, IDDM, HLD, active smoker (1/2 PPD x 28 yrs), hepatitis B (s/p treatment) presented with CC chest pressure and SOB. Patient was admitted for late presentation of anterolateral MI pedning [facility] after respiratory status optimization.

## 2025-01-08 NOTE — PROGRESS NOTE ADULT - SUBJECTIVE AND OBJECTIVE BOX
24H events:    Patient is a 44y old Male who presents with a chief complaint of MI (04 Jan 2025 12:40)    Primary diagnosis of ACS (acute coronary syndrome)    Today is hospital day 13d. This morning patient was seen and examined at bedside, resting comfortably in bed.    No acute or major events overnight.    Code Status: FULL CODE       PAST MEDICAL & SURGICAL HISTORY  HTN (hypertension)    Hepatitis B    DM (diabetes mellitus)      SOCIAL HISTORY:  Social History:      ALLERGIES:  Seafood (Urticaria)  No Known Drug Allergies  shellfish (Urticaria; Rash; Short breath)    MEDICATIONS:  STANDING MEDICATIONS  aMIOdarone    Tablet 400 milliGRAM(s) Oral two times a day  azithromycin  IVPB      azithromycin  IVPB 500 milliGRAM(s) IV Intermittent once  cefepime   IVPB 2000 milliGRAM(s) IV Intermittent every 8 hours  cefepime   IVPB      chlorhexidine 2% Cloths 1 Application(s) Topical daily  clopidogrel Tablet 75 milliGRAM(s) Oral daily  enoxaparin Injectable 100 milliGRAM(s) SubCutaneous every 12 hours  glucagon  Injectable 1 milliGRAM(s) IntraMuscular once  guaifenesin/dextromethorphan Oral Liquid 10 milliLiter(s) Oral every 4 hours  insulin glargine Injectable (LANTUS) 27 Unit(s) SubCutaneous at bedtime  insulin lispro (ADMELOG) corrective regimen sliding scale   SubCutaneous three times a day before meals  insulin lispro Injectable (ADMELOG) 9 Unit(s) SubCutaneous three times a day before meals  losartan 25 milliGRAM(s) Oral daily  melatonin 5 milliGRAM(s) Oral at bedtime  metoprolol tartrate 12.5 milliGRAM(s) Oral every 6 hours  nicotine -  14 mG/24Hr(s) Patch 1 Patch Transdermal every 24 hours  pantoprazole  Injectable 40 milliGRAM(s) IV Push with breakfast  regadenoson Injectable 0.4 milliGRAM(s) IV Push once    PRN MEDICATIONS  acetaminophen     Tablet .. 650 milliGRAM(s) Oral every 6 hours PRN  albuterol/ipratropium for Nebulization 3 milliLiter(s) Nebulizer every 6 hours PRN  benzocaine/menthol Lozenge 1 Lozenge Oral every 3 hours PRN  benzonatate 100 milliGRAM(s) Oral every 8 hours PRN    VITALS:   T(F): 97.6  HR: 76  BP: 112/62  RR: 19  SpO2: 93%    PHYSICAL EXAM:  GENERAL: NAD ; laying in bed ; Better than yesterday     NECK:  (x  ) Supple     (  ) neck stiffness     (  ) nuchal rigidity     (  )  no JVD     (  ) JVD present ( -- cm)    HEART:  Rate -->     (x  ) normal rate     (  ) bradycardic     (  ) tachycardic  Rhythm -->     ( x ) regular     (  ) regularly irregular     (  ) irregularly irregular  Murmurs -->     ( x ) normal s1s2     (  ) systolic murmur     (  ) diastolic murmur     (  ) continuous murmur      (  ) S3 present     (  ) S4 present    LUNGS:   (  )Unlabored respirations     ( x ) tachypnea  (  ) B/L air entry     (  x) decreased breath sounds in:  (location Bilateral     )    (  ) no adventitious sound     ( x ) crackles     (  ) wheezing      (  ) rhonchi      (specify location:       )  ( x ) chest wall tenderness (specify location:  Left diaphrgmatic area      )    ABDOMEN:   ( x) Soft     (  ) tense   |   ( x ) nondistended     (  ) distended   |   (x  ) +BS     (  ) hypoactive bowel sounds     (  ) hyperactive bowel sounds  (  ) nontender     (  ) RUQ tenderness     (  ) RLQ tenderness     (  ) LLQ tenderness     (  ) epigastric tenderness     (  ) diffuse tenderness  (  ) Splenomegaly      (  ) Hepatomegaly      (  ) Jaundice     (  ) ecchymosis     EXTREMITIES:  (  ) Normal     (  ) Rash     (  ) ecchymosis     (  ) varicose veins      ( x ) pitting edema   +1  (  ) non-pitting edema   (  ) ulceration     (  ) gangrene:     (location:     )    NERVOUS SYSTEM:    (  x) A&Ox3     (  ) confused     (  ) lethargic  CN II-XII:     (  ) Intact     (  ) deficits found     (Specify:     )   Upper extremities:     (  ) no sensorimotor deficits     (  ) weakness     (  ) loss of proprioception/vibration     (  ) loss of touch/temperature (specify:    )  Lower extremities:     (  ) no sensorimotor deficits     (  ) weakness     (  ) loss of proprioception/vibration     (  ) loss of touch/temperature (specify:    )    SKIN:   (  ) No rashes or lesions     (  ) maculopapular rash     (  ) pustules     (  ) vesicles     (  ) ulcer     (  ) ecchymosis     (specify location:     )    LABS:                  11.6   18.54 )-----------( 392      ( 08 Jan 2025 05:36 )             34.8     01-08    133[L]  |  97[L]  |  39[H]  ----------------------------<  323[H]  4.6   |  18  |  1.2    Ca    8.4      08 Jan 2025 05:36  Mg     2.3     01-08    TPro  7.3  /  Alb  4.2  /  TBili  0.3  /  DBili  x   /  AST  26  /  ALT  29  /  AlkPhos  195[H]  01-08      Culture - Blood (collected 01-06-25 @ 11:13)  Source: .Blood BLOOD  Preliminary Report (01-07-25 @ 22:01):    No growth at 24 hours    Culture - Blood (collected 12-26-24 @ 23:37)  Source: .Blood BLOOD  Final Report (01-01-25 @ 07:00):    No growth at 5 days    Culture - Blood (collected 12-26-24 @ 23:37)  Source: .Blood BLOOD  Final Report (01-01-25 @ 07:00):    No growth at 5 days      Creatinine: 1.2 mg/dL (01-08-25 @ 05:36)  Creatinine: 1.3 mg/dL (01-07-25 @ 23:50)  Creatinine: 1.2 mg/dL (01-07-25 @ 06:08)  Creatinine: 1.1 mg/dL (01-06-25 @ 21:35)  Creatinine: 0.8 mg/dL (01-06-25 @ 05:43)  Creatinine: 0.8 mg/dL (01-05-25 @ 05:45)  Creatinine: 0.7 mg/dL (01-04-25 @ 06:06)    Procalcitonin: 0.22 ng/mL (01-06-25 @ 11:13)  Procalcitonin: 0.16 ng/mL (12-30-24 @ 10:40)    D-Dimer Assay, Quantitative: 658 ng/mL DDU (01-01-25 @ 15:49)    Ferritin: 598 ng/mL (12-31-24 @ 20:20)    C-Reactive Protein: 154.1 mg/L (01-06-25 @ 16:43)  C-Reactive Protein: 43.5 mg/L (01-04-25 @ 06:06)  C-Reactive Protein: 55.6 mg/L (01-03-25 @ 05:25)  C-Reactive Protein: 69.2 mg/L (01-02-25 @ 05:14)  C-Reactive Protein: 93.5 mg/L (01-01-25 @ 04:24)  C-Reactive Protein: 101.8 mg/L (12-31-24 @ 20:20)  C-Reactive Protein: 91.0 mg/L (12-26-24 @ 22:35)    Sedimentation Rate, Erythrocyte: 120 mm/Hr (12-26-24 @ 22:35)    WBC Count: 18.54 K/uL (01-08-25 @ 05:36)  WBC Count: 16.70 K/uL (01-07-25 @ 06:08)  WBC Count: 15.85 K/uL (01-06-25 @ 05:43)  WBC Count: 14.67 K/uL (01-05-25 @ 05:45)  WBC Count: 13.41 K/uL (01-04-25 @ 06:06)    SARS-CoV-2 Result: Detected (12-26-24 @ 22:09)    Lactate Dehydrogenase, Serum: 307 U/L (01-02-25 @ 05:14)    Alkaline Phosphatase: 195 U/L (01-08-25 @ 05:36)  Alkaline Phosphatase: 182 U/L (01-07-25 @ 06:08)  Alkaline Phosphatase: 177 U/L (01-06-25 @ 05:43)  Alkaline Phosphatase: 168 U/L (01-05-25 @ 05:45)  Alanine Aminotransferase (ALT/SGPT): 29 U/L (01-08-25 @ 05:36)  Alanine Aminotransferase (ALT/SGPT): 29 U/L (01-07-25 @ 06:08)  Alanine Aminotransferase (ALT/SGPT): 27 U/L (01-06-25 @ 05:43)  Alanine Aminotransferase (ALT/SGPT): 24 U/L (01-05-25 @ 05:45)  Aspartate Aminotransferase (AST/SGOT): 26 U/L (01-08-25 @ 05:36)  Aspartate Aminotransferase (AST/SGOT): 29 U/L (01-07-25 @ 06:08)  Aspartate Aminotransferase (AST/SGOT): 32 U/L (01-06-25 @ 05:43)  Aspartate Aminotransferase (AST/SGOT): 27 U/L (01-05-25 @ 05:45)  Bilirubin Total: 0.3 mg/dL (01-08-25 @ 05:36)  Bilirubin Total: 0.2 mg/dL (01-07-25 @ 06:08)  Bilirubin Total: 0.3 mg/dL (01-06-25 @ 05:43)  Bilirubin Total: 0.3 mg/dL (01-05-25 @ 05:45)

## 2025-01-08 NOTE — PHYSICAL THERAPY INITIAL EVALUATION ADULT - GENERAL OBSERVATIONS, REHAB EVAL
10:40-11:10. chart reviewed. Pt received semi-lazo at B/S, alert, oriented, able to follow multi-step instructions and agreeable to PT evaluation. + monitoring, -ve orthostatic, + O2 hi flow 40L/40%, SPO2 90-93%, denies pain or discomfort. NAD.

## 2025-01-08 NOTE — PROGRESS NOTE ADULT - ASSESSMENT
45 YO M with PMHx of HTN, IDDM, HLD, active smoker (1/2 PPD x 28 yrs), hepatitis B (s/p treatment) presented with CC chest pressure and SOB. [facility]      EVENTS:   1/6: Patient has dyspnea ; cough with reddish sputum production. Saturating 90-91% on 4 L NC . Increased oxygen requirement. Bumex drip started 0.5mg/hr.   1/7: HFNC setting at 50L/65% ; satting 95-97% ; RR 22 ; Bumex drip increased to 1 mg/hr. Albumin 200ml q 12 hours for 2 doses started. Steroids started   1/8: Diuretics stopped; Oxygen requirement decreased to Fio2 38%. Feels Better ; Euvolemic on examination ; Diuretics : Torsemide.     # Possible ARDS   # Late presentation anterior MI   # Acute HFrEF, ICM, EF 25-30%  # Sustained monomorphic slow Vtach; 12 min overnight on 12/28 with rate ~ 140s  # LV thrombus  # HTN  - discontinued ASA 81 QD   - On plavix 75mg qd, Lovenox 100mg q12  - cw losartan 25 mg daily  - cw Metoprolol 12.5mg PO Q6  - s/p lidocaine   - c/w amiodarone 400 mg BID PO. today day 6  - Viability study showed nonviable myocardium  - EP following for VT:   -1/6:  80 mg Lasix given ;  Desaturation noted ; CXR bilateral opacities ; >> Patient does not want Bipap >> WIll try HFNC.   - ABG : Respiratory alkalosis. ; PaO2 68 CO2 29   - Started on Bumex 0.5 mg/hr. >> increased to 1 mg /hr.   - Rule out pneumonia : Started on Cefepime and Azithro>> changed to Doxy.    - Procal 0.22   - sputum culture ; Legionella and strept> Pending   - MRSA swab > Negative   - Pulm consulted; minimal pleural effusion ; not Tappable.     - Consulted HF team , following .   - ID consulted appreciated  - : steroids started ARDS dose ; Decadron 20 mg IV for 5 days and 10 mg IV for next 5 days.    - Monitor intake and output   - BMP BID   - Keep K > 4.0 and Mag > 2.0.     #HLD  #IDDM w/ hyperglycemia  -TSH 0.99  -A1c 10.8 - DM control  -  - on lantus 27 and lispro of 9.>> Increased to Lantus 30 units and Lispro 12 units ; Increased Sliding scale requirement.     DVT PPX: Lovenox  Diet: DASH CC with fluid restriction  DISPO: Cardiac SDU    PENDING:     BMP and Magnesium at 8 pm ; Awaiting Cardiac Cath (to be decided yet ; Pending improvement of resp status)   Full code

## 2025-01-08 NOTE — PROGRESS NOTE ADULT - SUBJECTIVE AND OBJECTIVE BOX
CHIEF COMPLAINT:  Patient is a 44y old  Male who presents with a chief complaint of MI (04 Jan 2025 12:40)      INTERVAL HISTORY/OVERNIGHT EVENTS:  The patient has been transitioned to PO torsemide and was started on high dose dexamethasone for COVI-19 PNA. His oxygen requirements have improved as compared to yesterday and he is down to 40/40 on his HFNC settings. He reports improvement in his SOB and can lie flat. IVC 2.0 to 1.2 on respiration. his CXR has slightly improved as well. his kidney function has been stable.     ======================  MEDICATIONS:  aMIOdarone    Tablet 400 milliGRAM(s) Oral two times a day  cefepime   IVPB      cefepime   IVPB 2000 milliGRAM(s) IV Intermittent every 8 hours  cetirizine 10 milliGRAM(s) Oral daily  chlorhexidine 2% Cloths 1 Application(s) Topical daily  clopidogrel Tablet 75 milliGRAM(s) Oral daily  dexAMETHasone  IVPB 20 milliGRAM(s) IV Intermittent daily  doxycycline IVPB      doxycycline IVPB 100 milliGRAM(s) IV Intermittent every 12 hours  enoxaparin Injectable 100 milliGRAM(s) SubCutaneous every 12 hours  glucagon  Injectable 1 milliGRAM(s) IntraMuscular once  insulin glargine Injectable (LANTUS) 30 Unit(s) SubCutaneous at bedtime  insulin lispro (ADMELOG) corrective regimen sliding scale   SubCutaneous three times a day before meals  insulin lispro Injectable (ADMELOG) 12 Unit(s) SubCutaneous three times a day before meals  losartan 25 milliGRAM(s) Oral daily  melatonin 5 milliGRAM(s) Oral at bedtime  metoprolol tartrate 12.5 milliGRAM(s) Oral every 6 hours  nicotine -  14 mG/24Hr(s) Patch 1 Patch Transdermal every 24 hours  pantoprazole  Injectable 40 milliGRAM(s) IV Push with breakfast  regadenoson Injectable 0.4 milliGRAM(s) IV Push once    DRIPS:    PRN:       ======================  PHYSICAL EXAMINATION:  GEN:  nad.   PULM:  b/l crackles  CARD: s1, s2 no murmur  ABD: +bs. ntnd  EXT:  No edema  NEURO:  AAOX4 and following commands    ======================  OBJECTIVE:        VS:  T(F): 97.9 (01-08 @ 07:39), Max: 98 (01-07 @ 15:39)  HR: 80 (01-08 @ 12:23) (70 - 84)  BP: 106/58 (01-08 @ 12:23) (93/53 - 113/68)  RR: 20 (01-08 @ 12:23) (17 - 29)  SpO2: 95% (01-08 @ 12:23) (91% - 100%)  CVP(mm Hg): --  CO: --  CI: --  PA: --  PCWP: --    I/O:      01-05 @ 07:01  -  01-06 @ 07:00  --------------------------------------------------------  IN: 870 mL / OUT: 2000 mL / NET: -1130 mL    01-06 @ 07:01  -  01-07 @ 07:00  --------------------------------------------------------  IN: 492.5 mL / OUT: 4800 mL / NET: -4307.5 mL    01-07 @ 07:01  -  01-08 @ 07:00  --------------------------------------------------------  IN: 1185 mL / OUT: 2750 mL / NET: -1565 mL    01-08 @ 07:01  -  01-08 @ 14:01  --------------------------------------------------------  IN: 236 mL / OUT: 0 mL / NET: 236 mL        Weight trend:      ======================    LABS:                          11.6   18.54 )-----------( 392      ( 08 Jan 2025 05:36 )             34.8     01-08    133[L]  |  97[L]  |  39[H]  ----------------------------<  323[H]  4.6   |  18  |  1.2    Ca    8.4      08 Jan 2025 05:36  Mg     2.3     01-08    TPro  7.3  /  Alb  4.2  /  TBili  0.3  /  DBili  x   /  AST  26  /  ALT  29  /  AlkPhos  195[H]  01-08    LIVER FUNCTIONS - ( 08 Jan 2025 05:36 )  Alb: 4.2 g/dL / Pro: 7.3 g/dL / ALK PHOS: 195 U/L / ALT: 29 U/L / AST: 26 U/L / GGT: x                     Urinalysis Basic - ( 08 Jan 2025 05:36 )    Color: x / Appearance: x / SG: x / pH: x  Gluc: 323 mg/dL / Ketone: x  / Bili: x / Urobili: x   Blood: x / Protein: x / Nitrite: x   Leuk Esterase: x / RBC: x / WBC x   Sq Epi: x / Non Sq Epi: x / Bacteria: x        Cultures:    Culture - Blood (collected 01-06)  Source: .Blood BLOOD  Preliminary Report:    No growth at 24 hours      -TTE:  Summary:   1. Endocardial visualization was enhanced with intravenous echo contrast.   2. Left ventricular ejection fraction, by visual estimation, is 25 to   30%.  3. Severely decreased global left ventricular systolic function.   4. Multiple left ventricular regional wall motion abnormalities exist.   See wall motion findings.   5. Spectral Doppler shows pseudonormal pattern of left ventricular   myocardial filling (Grade II diastolic dysfunction).   6. Small, fixed, anteroapical left ventricular thrombus.   7. Right ventricular apical hypokinesis.   8. Mild mitral regurgitation.   9. Findings discussed with CCU team.          -CCTA:  -STRESS TEST:  -CATHETERIZATION:  -OTHER:  IMPRESSION:  1.  Using sestamibi as a surrogate marker of viability,sestamibi uptake   is visualized in the inferior wall, lateral wall, apical aspect of the   septum and a small portion of the basal anterior wall demonstrating   viability.  2. No definite sestamibi uptake in apex, apical 2/3 of the anterior wall,   and apicoseptal region suggesting no viability. Clinical correlation is   suggested, and perhaps additional markers of viability may be useful.    2.  Dilated left ventricle with marked global hypokinesis. No thickening   of the apex, distal (apical) 2/3 of the anterior wall and apicoseptal   wall  . Inferior, lateral, inferoseptal and a small portion of   anterobasal wall do thicken    3 .Left ventricular ejection fraction was calculated to be 24% which is   low. Wall motion analysis suggests ejection fraction of 20%.   Echocardiographic estimated ejection fraction was 25-30% on 12/27/2024    --- End of Report ---

## 2025-01-08 NOTE — PHYSICAL THERAPY INITIAL EVALUATION ADULT - GAIT TRAINING, PT EVAL
Pt will ambulate using RW or least restrictive AD for 300 ft with MI by discharge to facilitate return to PLOF. Pt will negotiate 12 steps using 1 HR under MI.

## 2025-01-09 LAB
ALBUMIN SERPL ELPH-MCNC: 3.9 G/DL — SIGNIFICANT CHANGE UP (ref 3.5–5.2)
ALP SERPL-CCNC: 229 U/L — HIGH (ref 30–115)
ALT FLD-CCNC: 54 U/L — HIGH (ref 0–41)
ANION GAP SERPL CALC-SCNC: 20 MMOL/L — HIGH (ref 7–14)
AST SERPL-CCNC: 51 U/L — HIGH (ref 0–41)
B-OH-BUTYR SERPL-SCNC: <0.2 MMOL/L — SIGNIFICANT CHANGE UP
BASOPHILS # BLD AUTO: 0.04 K/UL — SIGNIFICANT CHANGE UP (ref 0–0.2)
BASOPHILS NFR BLD AUTO: 0.1 % — SIGNIFICANT CHANGE UP (ref 0–1)
BILIRUB SERPL-MCNC: 0.2 MG/DL — SIGNIFICANT CHANGE UP (ref 0.2–1.2)
BUN SERPL-MCNC: 53 MG/DL — HIGH (ref 10–20)
CALCIUM SERPL-MCNC: 8.4 MG/DL — SIGNIFICANT CHANGE UP (ref 8.4–10.5)
CHLORIDE SERPL-SCNC: 98 MMOL/L — SIGNIFICANT CHANGE UP (ref 98–110)
CO2 SERPL-SCNC: 16 MMOL/L — LOW (ref 17–32)
CREAT SERPL-MCNC: 1.3 MG/DL — SIGNIFICANT CHANGE UP (ref 0.7–1.5)
EGFR: 69 ML/MIN/1.73M2 — SIGNIFICANT CHANGE UP
EOSINOPHIL # BLD AUTO: 0.01 K/UL — SIGNIFICANT CHANGE UP (ref 0–0.7)
EOSINOPHIL NFR BLD AUTO: 0 % — SIGNIFICANT CHANGE UP (ref 0–8)
GLUCOSE BLDC GLUCOMTR-MCNC: 303 MG/DL — HIGH (ref 70–99)
GLUCOSE BLDC GLUCOMTR-MCNC: 314 MG/DL — HIGH (ref 70–99)
GLUCOSE BLDC GLUCOMTR-MCNC: 343 MG/DL — HIGH (ref 70–99)
GLUCOSE BLDC GLUCOMTR-MCNC: 370 MG/DL — HIGH (ref 70–99)
GLUCOSE BLDC GLUCOMTR-MCNC: 385 MG/DL — HIGH (ref 70–99)
GLUCOSE BLDC GLUCOMTR-MCNC: 431 MG/DL — HIGH (ref 70–99)
GLUCOSE SERPL-MCNC: 315 MG/DL — HIGH (ref 70–99)
HCT VFR BLD CALC: 35.1 % — LOW (ref 42–52)
HGB BLD-MCNC: 11.5 G/DL — LOW (ref 14–18)
IMM GRANULOCYTES NFR BLD AUTO: 1.6 % — HIGH (ref 0.1–0.3)
LACTATE SERPL-SCNC: 2.5 MMOL/L — HIGH (ref 0.7–2)
LACTATE SERPL-SCNC: 2.6 MMOL/L — HIGH (ref 0.7–2)
LYMPHOCYTES # BLD AUTO: 1.36 K/UL — SIGNIFICANT CHANGE UP (ref 1.2–3.4)
LYMPHOCYTES # BLD AUTO: 5.1 % — LOW (ref 20.5–51.1)
MAGNESIUM SERPL-MCNC: 2.4 MG/DL — SIGNIFICANT CHANGE UP (ref 1.8–2.4)
MCHC RBC-ENTMCNC: 31.1 PG — HIGH (ref 27–31)
MCHC RBC-ENTMCNC: 32.8 G/DL — SIGNIFICANT CHANGE UP (ref 32–37)
MCV RBC AUTO: 94.9 FL — HIGH (ref 80–94)
MONOCYTES # BLD AUTO: 1.06 K/UL — HIGH (ref 0.1–0.6)
MONOCYTES NFR BLD AUTO: 4 % — SIGNIFICANT CHANGE UP (ref 1.7–9.3)
NEUTROPHILS # BLD AUTO: 23.86 K/UL — HIGH (ref 1.4–6.5)
NEUTROPHILS NFR BLD AUTO: 89.2 % — HIGH (ref 42.2–75.2)
NRBC # BLD: 0 /100 WBCS — SIGNIFICANT CHANGE UP (ref 0–0)
PLATELET # BLD AUTO: 437 K/UL — HIGH (ref 130–400)
PMV BLD: 9.4 FL — SIGNIFICANT CHANGE UP (ref 7.4–10.4)
POTASSIUM SERPL-MCNC: 4.7 MMOL/L — SIGNIFICANT CHANGE UP (ref 3.5–5)
POTASSIUM SERPL-SCNC: 4.7 MMOL/L — SIGNIFICANT CHANGE UP (ref 3.5–5)
PROT SERPL-MCNC: 7 G/DL — SIGNIFICANT CHANGE UP (ref 6–8)
RBC # BLD: 3.7 M/UL — LOW (ref 4.7–6.1)
RBC # FLD: 12.9 % — SIGNIFICANT CHANGE UP (ref 11.5–14.5)
SODIUM SERPL-SCNC: 134 MMOL/L — LOW (ref 135–146)
WBC # BLD: 26.76 K/UL — HIGH (ref 4.8–10.8)
WBC # FLD AUTO: 26.76 K/UL — HIGH (ref 4.8–10.8)

## 2025-01-09 PROCEDURE — 99233 SBSQ HOSP IP/OBS HIGH 50: CPT

## 2025-01-09 PROCEDURE — 99233 SBSQ HOSP IP/OBS HIGH 50: CPT | Mod: 25

## 2025-01-09 PROCEDURE — 76705 ECHO EXAM OF ABDOMEN: CPT | Mod: 26

## 2025-01-09 RX ORDER — BUMETANIDE 2 MG/1
2 TABLET ORAL ONCE
Refills: 0 | Status: COMPLETED | OUTPATIENT
Start: 2025-01-09 | End: 2025-01-09

## 2025-01-09 RX ORDER — DEXAMETHASONE SODIUM PHOSPHATE 4 MG/ML
10 VIAL (ML) INJECTION DAILY
Refills: 0 | Status: DISCONTINUED | OUTPATIENT
Start: 2025-01-10 | End: 2025-01-12

## 2025-01-09 RX ORDER — INSULIN HUMAN 100 [IU]/ML
10 INJECTION, SOLUTION SUBCUTANEOUS ONCE
Refills: 0 | Status: COMPLETED | OUTPATIENT
Start: 2025-01-09 | End: 2025-01-09

## 2025-01-09 RX ORDER — INSULIN GLARGINE-YFGN 100 [IU]/ML
40 INJECTION, SOLUTION SUBCUTANEOUS AT BEDTIME
Refills: 0 | Status: DISCONTINUED | OUTPATIENT
Start: 2025-01-09 | End: 2025-01-11

## 2025-01-09 RX ORDER — BUMETANIDE 2 MG/1
2 TABLET ORAL DAILY
Refills: 0 | Status: DISCONTINUED | OUTPATIENT
Start: 2025-01-09 | End: 2025-01-09

## 2025-01-09 RX ORDER — INSULIN LISPRO 100/ML
18 VIAL (ML) SUBCUTANEOUS
Refills: 0 | Status: DISCONTINUED | OUTPATIENT
Start: 2025-01-09 | End: 2025-01-10

## 2025-01-09 RX ADMIN — NICOTINE POLACRILEX 1 PATCH: 4 LOZENGE ORAL at 07:27

## 2025-01-09 RX ADMIN — INSULIN HUMAN 10 UNIT(S): 100 INJECTION, SOLUTION SUBCUTANEOUS at 14:52

## 2025-01-09 RX ADMIN — Medication 100 MILLIGRAM(S): at 21:24

## 2025-01-09 RX ADMIN — NICOTINE POLACRILEX 1 PATCH: 4 LOZENGE ORAL at 19:30

## 2025-01-09 RX ADMIN — CLOPIDOGREL BISULFATE 75 MILLIGRAM(S): 75 TABLET, FILM COATED ORAL at 11:44

## 2025-01-09 RX ADMIN — CETIRIZINE HYDROCHLORIDE 10 MILLIGRAM(S): 5 SYRUP ORAL at 11:44

## 2025-01-09 RX ADMIN — Medication 100 MILLIGRAM(S): at 17:51

## 2025-01-09 RX ADMIN — Medication 12.5 MILLIGRAM(S): at 06:27

## 2025-01-09 RX ADMIN — INSULIN GLARGINE-YFGN 40 UNIT(S): 100 INJECTION, SOLUTION SUBCUTANEOUS at 21:23

## 2025-01-09 RX ADMIN — ENOXAPARIN SODIUM 100 MILLIGRAM(S): 60 INJECTION INTRAVENOUS; SUBCUTANEOUS at 06:27

## 2025-01-09 RX ADMIN — PANTOPRAZOLE 40 MILLIGRAM(S): 40 TABLET, DELAYED RELEASE ORAL at 08:04

## 2025-01-09 RX ADMIN — NICOTINE POLACRILEX 1 PATCH: 4 LOZENGE ORAL at 06:02

## 2025-01-09 RX ADMIN — Medication 12 UNIT(S): at 11:37

## 2025-01-09 RX ADMIN — Medication 12.5 MILLIGRAM(S): at 11:43

## 2025-01-09 RX ADMIN — BUMETANIDE 2 MILLIGRAM(S): 2 TABLET ORAL at 20:03

## 2025-01-09 RX ADMIN — ACETAMINOPHEN 650 MILLIGRAM(S): 80 SOLUTION/ DROPS ORAL at 21:41

## 2025-01-09 RX ADMIN — ACETAMINOPHEN 650 MILLIGRAM(S): 80 SOLUTION/ DROPS ORAL at 22:41

## 2025-01-09 RX ADMIN — Medication 100 MILLIGRAM(S): at 06:26

## 2025-01-09 RX ADMIN — Medication 100 MILLIGRAM(S): at 22:27

## 2025-01-09 RX ADMIN — INSULIN HUMAN 10 UNIT(S): 100 INJECTION, SOLUTION SUBCUTANEOUS at 11:37

## 2025-01-09 RX ADMIN — Medication 12 UNIT(S): at 08:03

## 2025-01-09 RX ADMIN — Medication 18 UNIT(S): at 17:54

## 2025-01-09 RX ADMIN — AMIODARONE HYDROCHLORIDE 400 MILLIGRAM(S): 200 TABLET ORAL at 17:55

## 2025-01-09 RX ADMIN — Medication 5 MILLIGRAM(S): at 21:24

## 2025-01-09 RX ADMIN — NICOTINE POLACRILEX 1 PATCH: 4 LOZENGE ORAL at 06:27

## 2025-01-09 RX ADMIN — Medication 12.5 MILLIGRAM(S): at 17:57

## 2025-01-09 RX ADMIN — ENOXAPARIN SODIUM 100 MILLIGRAM(S): 60 INJECTION INTRAVENOUS; SUBCUTANEOUS at 17:53

## 2025-01-09 RX ADMIN — TORSEMIDE 40 MILLIGRAM(S): 10 TABLET ORAL at 06:27

## 2025-01-09 RX ADMIN — Medication 12.5 MILLIGRAM(S): at 23:59

## 2025-01-09 RX ADMIN — Medication 10: at 08:04

## 2025-01-09 RX ADMIN — ACETAMINOPHEN 650 MILLIGRAM(S): 80 SOLUTION/ DROPS ORAL at 01:43

## 2025-01-09 RX ADMIN — Medication 12: at 11:38

## 2025-01-09 RX ADMIN — Medication 100 MILLIGRAM(S): at 14:56

## 2025-01-09 RX ADMIN — LOSARTAN POTASSIUM 25 MILLIGRAM(S): 100 TABLET, FILM COATED ORAL at 06:31

## 2025-01-09 RX ADMIN — BENZOCAINE AND MENTHOL 1 LOZENGE: 15; 3.6 LOZENGE ORAL at 23:50

## 2025-01-09 RX ADMIN — CHLORHEXIDINE GLUCONATE 1 APPLICATION(S): 1.2 RINSE ORAL at 06:27

## 2025-01-09 RX ADMIN — Medication 110 MILLIGRAM(S): at 06:26

## 2025-01-09 RX ADMIN — Medication 8: at 17:54

## 2025-01-09 RX ADMIN — AMIODARONE HYDROCHLORIDE 400 MILLIGRAM(S): 200 TABLET ORAL at 06:26

## 2025-01-09 NOTE — PROGRESS NOTE ADULT - SUBJECTIVE AND OBJECTIVE BOX
Patient is a 44y old  Male who presents with a chief complaint of MI (04 Jan 2025 12:40)        Over Night Events:    No fevers   O2 requirement better   CXR stable         ROS:  See HPI    PHYSICAL EXAM    ICU Vital Signs Last 24 Hrs  T(C): 36.4 (09 Jan 2025 07:25), Max: 36.8 (08 Jan 2025 14:54)  T(F): 97.6 (09 Jan 2025 07:25), Max: 98.2 (08 Jan 2025 14:54)  HR: 68 (09 Jan 2025 07:25) (68 - 80)  BP: 96/53 (09 Jan 2025 07:25) (96/53 - 128/64)  BP(mean): 68 (09 Jan 2025 07:25) (68 - 87)  ABP: --  ABP(mean): --  RR: 20 (09 Jan 2025 07:25) (18 - 34)  SpO2: 91% (09 Jan 2025 07:25) (90% - 95%)    O2 Parameters below as of 09 Jan 2025 07:25  Patient On (Oxygen Delivery Method): nasal cannula w/ humidification  O2 Flow (L/min): 5.5          General: NAD  HEENT: BART             Lymphatic system: No cervical LN   Lungs: Bilateral BS, crackles   Cardiovascular: Regular   Gastrointestinal: Soft, Positive BS  Extremities: No clubbing.  Moves extremities.  Full Range of motion   Skin: Warm, intact  Neurological: No motor or sensory deficit       01-08-25 @ 07:01  -  01-09-25 @ 07:00  --------------------------------------------------------  IN:    IV PiggyBack: 250 mL    Oral Fluid: 236 mL  Total IN: 486 mL    OUT:    Voided (mL): 1225 mL  Total OUT: 1225 mL    Total NET: -739 mL          LABS:                            11.5   26.76 )-----------( 437      ( 09 Jan 2025 06:30 )             35.1                                               01-09    134[L]  |  98  |  53[H]  ----------------------------<  315[H]  4.7   |  16[L]  |  1.3    Ca    8.4      09 Jan 2025 06:30  Mg     2.4     01-09    TPro  7.0  /  Alb  3.9  /  TBili  0.2  /  DBili  x   /  AST  51[H]  /  ALT  54[H]  /  AlkPhos  229[H]  01-09                                             Urinalysis Basic - ( 09 Jan 2025 06:30 )    Color: x / Appearance: x / SG: x / pH: x  Gluc: 315 mg/dL / Ketone: x  / Bili: x / Urobili: x   Blood: x / Protein: x / Nitrite: x   Leuk Esterase: x / RBC: x / WBC x   Sq Epi: x / Non Sq Epi: x / Bacteria: x                                                  LIVER FUNCTIONS - ( 09 Jan 2025 06:30 )  Alb: 3.9 g/dL / Pro: 7.0 g/dL / ALK PHOS: 229 U/L / ALT: 54 U/L / AST: 51 U/L / GGT: x                                                  Culture - Blood (collected 06 Jan 2025 11:13)  Source: .Blood BLOOD  Preliminary Report (08 Jan 2025 22:01):    No growth at 48 Hours                                                                                           MEDICATIONS  (STANDING):  aMIOdarone    Tablet 400 milliGRAM(s) Oral two times a day  cefepime   IVPB      cefepime   IVPB 2000 milliGRAM(s) IV Intermittent every 8 hours  cetirizine 10 milliGRAM(s) Oral daily  chlorhexidine 2% Cloths 1 Application(s) Topical daily  clopidogrel Tablet 75 milliGRAM(s) Oral daily  dexAMETHasone  IVPB 20 milliGRAM(s) IV Intermittent daily  doxycycline IVPB      doxycycline IVPB 100 milliGRAM(s) IV Intermittent every 12 hours  enoxaparin Injectable 100 milliGRAM(s) SubCutaneous every 12 hours  glucagon  Injectable 1 milliGRAM(s) IntraMuscular once  insulin glargine Injectable (LANTUS) 30 Unit(s) SubCutaneous at bedtime  insulin lispro (ADMELOG) corrective regimen sliding scale   SubCutaneous three times a day before meals  insulin lispro Injectable (ADMELOG) 12 Unit(s) SubCutaneous three times a day before meals  losartan 25 milliGRAM(s) Oral daily  melatonin 5 milliGRAM(s) Oral at bedtime  metoprolol tartrate 12.5 milliGRAM(s) Oral every 6 hours  nicotine -  14 mG/24Hr(s) Patch 1 Patch Transdermal every 24 hours  pantoprazole  Injectable 40 milliGRAM(s) IV Push with breakfast  regadenoson Injectable 0.4 milliGRAM(s) IV Push once  torsemide 40 milliGRAM(s) Oral daily    MEDICATIONS  (PRN):  acetaminophen     Tablet .. 650 milliGRAM(s) Oral every 6 hours PRN Mild Pain (1 - 3)  albuterol/ipratropium for Nebulization 3 milliLiter(s) Nebulizer every 6 hours PRN Shortness of Breath and/or Wheezing  benzocaine/menthol Lozenge 1 Lozenge Oral every 3 hours PRN Sore Throat  benzonatate 100 milliGRAM(s) Oral every 8 hours PRN Cough  guaifenesin/dextromethorphan Oral Liquid 10 milliLiter(s) Oral every 4 hours PRN Cough      Xrays:                                                                                     ECHO

## 2025-01-09 NOTE — PROGRESS NOTE ADULT - ASSESSMENT
IMPRESSION:    Acute hypoxemic respiratory failure on hfnc  HFREF with severely reduced EF   VTACH s/p cardioversion   LV thrombus   NSTEMI   COVID pneumonia       PLAN:    CXR noted to be better   On NC and appears comfortable today   Spo2 91%  On PO torsemide; cardiology following   Lower Dexamethasone to 10 mg daily   Complete the total 10 day duration   Better FS control with goal 140-180   Rise in LFTs noted; Bili normal; check US RUQ   Abx management per ID   HAGMA noted; check lactate; beta hydroxy as well   Fluid balance negative overall   On therapeutic AC; hg stable; VTE unlikely   Will follow

## 2025-01-09 NOTE — PROGRESS NOTE ADULT - SUBJECTIVE AND OBJECTIVE BOX
CHIEF COMPLAINT:  Patient is a 44y old  Male who presents with a chief complaint of MI (04 Jan 2025 12:40)      INTERVAL HISTORY/OVERNIGHT EVENTS:  Started on dexamethasone respiratory status has improved patient can now lie flat off HFNC. He now has significant hyperglycemia but his BHB is negative. JVD distended     ======================  MEDICATIONS:  aMIOdarone    Tablet 400 milliGRAM(s) Oral two times a day  cefepime   IVPB      cefepime   IVPB 2000 milliGRAM(s) IV Intermittent every 8 hours  cetirizine 10 milliGRAM(s) Oral daily  chlorhexidine 2% Cloths 1 Application(s) Topical daily  clopidogrel Tablet 75 milliGRAM(s) Oral daily  doxycycline IVPB      doxycycline IVPB 100 milliGRAM(s) IV Intermittent every 12 hours  enoxaparin Injectable 100 milliGRAM(s) SubCutaneous every 12 hours  glucagon  Injectable 1 milliGRAM(s) IntraMuscular once  insulin glargine Injectable (LANTUS) 30 Unit(s) SubCutaneous at bedtime  insulin lispro (ADMELOG) corrective regimen sliding scale   SubCutaneous three times a day before meals  insulin lispro Injectable (ADMELOG) 12 Unit(s) SubCutaneous three times a day before meals  losartan 25 milliGRAM(s) Oral daily  melatonin 5 milliGRAM(s) Oral at bedtime  metoprolol tartrate 12.5 milliGRAM(s) Oral every 6 hours  nicotine -  14 mG/24Hr(s) Patch 1 Patch Transdermal every 24 hours  pantoprazole  Injectable 40 milliGRAM(s) IV Push with breakfast  regadenoson Injectable 0.4 milliGRAM(s) IV Push once  torsemide 40 milliGRAM(s) Oral daily    DRIPS:    PRN:       ======================  PHYSICAL EXAMINATION:    GEN:  nad.   JVD  PULM:  b/l crackles  CARD: s1, s2 no murmur  ABD: +bs. ntnd  EXT:  No edema  NEURO:  AAOX4 and following commands  ======================  OBJECTIVE:        VS:  T(F): 97.6 (01-09 @ 07:25), Max: 98.2 (01-08 @ 14:54)  HR: 68 (01-09 @ 07:25) (68 - 80)  BP: 96/53 (01-09 @ 07:25) (96/53 - 128/64)  RR: 20 (01-09 @ 07:25) (18 - 34)  SpO2: 91% (01-09 @ 07:25) (90% - 93%)  CVP(mm Hg): --  CO: --  CI: --  PA: --  PCWP: --    I/O:      01-06 @ 07:01  -  01-07 @ 07:00  --------------------------------------------------------  IN: 492.5 mL / OUT: 4800 mL / NET: -4307.5 mL    01-07 @ 07:01  -  01-08 @ 07:00  --------------------------------------------------------  IN: 1185 mL / OUT: 2750 mL / NET: -1565 mL    01-08 @ 07:01  -  01-09 @ 07:00  --------------------------------------------------------  IN: 486 mL / OUT: 1225 mL / NET: -739 mL    01-09 @ 07:01  -  01-09 @ 12:26  --------------------------------------------------------  IN: 0 mL / OUT: 700 mL / NET: -700 mL        Weight trend:      ======================    LABS:                          11.5   26.76 )-----------( 437      ( 09 Jan 2025 06:30 )             35.1     01-09    134[L]  |  98  |  53[H]  ----------------------------<  315[H]  4.7   |  16[L]  |  1.3    Ca    8.4      09 Jan 2025 06:30  Mg     2.4     01-09    TPro  7.0  /  Alb  3.9  /  TBili  0.2  /  DBili  x   /  AST  51[H]  /  ALT  54[H]  /  AlkPhos  229[H]  01-09    LIVER FUNCTIONS - ( 09 Jan 2025 06:30 )  Alb: 3.9 g/dL / Pro: 7.0 g/dL / ALK PHOS: 229 U/L / ALT: 54 U/L / AST: 51 U/L / GGT: x                     Urinalysis Basic - ( 09 Jan 2025 06:30 )    Color: x / Appearance: x / SG: x / pH: x  Gluc: 315 mg/dL / Ketone: x  / Bili: x / Urobili: x   Blood: x / Protein: x / Nitrite: x   Leuk Esterase: x / RBC: x / WBC x   Sq Epi: x / Non Sq Epi: x / Bacteria: x        Cultures:    Culture - Blood (collected 01-06)  Source: .Blood BLOOD  Preliminary Report:    No growth at 48 Hours          -TTE:  Summary:   1. Endocardial visualization was enhanced with intravenous echo contrast.   2. Left ventricular ejection fraction, by visual estimation, is 25 to   30%.  3. Severely decreased global left ventricular systolic function.   4. Multiple left ventricular regional wall motion abnormalities exist.   See wall motion findings.   5. Spectral Doppler shows pseudonormal pattern of left ventricular   myocardial filling (Grade II diastolic dysfunction).   6. Small, fixed, anteroapical left ventricular thrombus.   7. Right ventricular apical hypokinesis.   8. Mild mitral regurgitation.   9. Findings discussed with CCU team.          -CCTA:  -STRESS TEST:  -CATHETERIZATION:  -OTHER:  IMPRESSION:  1.  Using sestamibi as a surrogate marker of viability,sestamibi uptake   is visualized in the inferior wall, lateral wall, apical aspect of the   septum and a small portion of the basal anterior wall demonstrating   viability.  2. No definite sestamibi uptake in apex, apical 2/3 of the anterior wall,   and apicoseptal region suggesting no viability. Clinical correlation is   suggested, and perhaps additional markers of viability may be useful.    2.  Dilated left ventricle with marked global hypokinesis. No thickening   of the apex, distal (apical) 2/3 of the anterior wall and apicoseptal   wall  . Inferior, lateral, inferoseptal and a small portion of   anterobasal wall do thicken    3 .Left ventricular ejection fraction was calculated to be 24% which is   low. Wall motion analysis suggests ejection fraction of 20%.   Echocardiographic estimated ejection fraction was 25-30% on 12/27/2024    --- End of Report ---

## 2025-01-09 NOTE — PROGRESS NOTE ADULT - ASSESSMENT
43 YO M with PMHx of HTN, IDDM, HLD, active smoker (1/2 PPD x 28 yrs), hepatitis B (s/p treatment) presented with CC chest pressure and SOB. [facility]      EVENTS:   1/6: Patient has dyspnea ; cough with reddish sputum production. Saturating 90-91% on 4 L NC . Increased oxygen requirement. Bumex drip started 0.5mg/hr.   1/7: HFNC setting at 50L/65% ; satting 95-97% ; RR 22 ; Bumex drip increased to 1 mg/hr. Albumin 200ml q 12 hours for 2 doses started. Steroids started   1/8: Diuretics stopped; Oxygen requirement decreased to Fio2 38%. Feels Better ; Euvolemic on examination ; Diuretics : Torsemide.   1/9 : Patient vitally stable. One dose Bumex 2 gm at night.     # Possible ARDS   # Late presentation anterior MI   # Acute HFrEF, ICM, EF 25-30%  # Sustained monomorphic slow Vtach; 12 min overnight on 12/28 with rate ~ 140s  # LV thrombus  # HTN  - discontinued ASA 81 QD   - On plavix 75mg qd, Lovenox 100mg q12  - cw losartan 25 mg daily  - cw Metoprolol 12.5mg PO Q6  - s/p lidocaine   - c/w amiodarone 400 mg BID PO. today day 6  - Viability study showed nonviable myocardium  - EP following for VT:   -1/6:  80 mg Lasix given ;  Desaturation noted ; CXR bilateral opacities ; >> Patient does not want Bipap >> WIll try HFNC. >> Off HFNC.   - ABG : Respiratory alkalosis. ; PaO2 68 CO2 29   - Started on Bumex 0.5 mg/hr. >> increased to 1 mg /hr. >> Stopped ; Will give 1 dose Bumex at night as per HF recommendation.,   - Rule out pneumonia : Started on Cefepime and Azithro>> changed to Doxy.    - Procal 0.22   - MRSA swab > Negative   - Pulm consulted; minimal pleural effusion ;  On IV steroids   - Consulted HF team , following .   - ID consulted appreciated  - : steroids started ARDS dose ; Decadron 20 mg IV>>changed to 10 mg ( Hyperglycemia)   - Monitor intake and output   - BMP BID   - Keep K > 4.0 and Mag > 2.0.     #HLD  #IDDM w/ hyperglycemia  -TSH 0.99  -A1c 10.8 - DM control  -  - Hyperglycemic due to steroids.   - on lantus 27 and lispro of 9.>> Increased to Lantus 30 units and Lispro 12 units ; Increased Sliding scale requirement. >> 10 units regular insulin x 2; increased lantus 40 units and lispro 16 units.     DVT PPX: Lovenox  Diet: DASH CC with fluid restriction  DISPO: Cardiac SDU    PENDING:     NPO after midnight for cath tomorrow.

## 2025-01-09 NOTE — PROGRESS NOTE ADULT - SUBJECTIVE AND OBJECTIVE BOX
24H events:    Patient is a 44y old Male who presents with a chief complaint of MI (04 Jan 2025 12:40)    Primary diagnosis of ACS (acute coronary syndrome)    Today is hospital day 14d. This morning patient was seen and examined at bedside, resting comfortably in bed.    No acute or major events overnight.    Code Status: FULL CODE       PAST MEDICAL & SURGICAL HISTORY  HTN (hypertension)    Hepatitis B    DM (diabetes mellitus)      SOCIAL HISTORY:  Social History:      ALLERGIES:  Seafood (Urticaria)  No Known Drug Allergies  shellfish (Urticaria; Rash; Short breath)    MEDICATIONS:  STANDING MEDICATIONS  aMIOdarone    Tablet 400 milliGRAM(s) Oral two times a day  azithromycin  IVPB      azithromycin  IVPB 500 milliGRAM(s) IV Intermittent once  cefepime   IVPB 2000 milliGRAM(s) IV Intermittent every 8 hours  cefepime   IVPB      chlorhexidine 2% Cloths 1 Application(s) Topical daily  clopidogrel Tablet 75 milliGRAM(s) Oral daily  enoxaparin Injectable 100 milliGRAM(s) SubCutaneous every 12 hours  glucagon  Injectable 1 milliGRAM(s) IntraMuscular once  guaifenesin/dextromethorphan Oral Liquid 10 milliLiter(s) Oral every 4 hours  insulin glargine Injectable (LANTUS) 27 Unit(s) SubCutaneous at bedtime  insulin lispro (ADMELOG) corrective regimen sliding scale   SubCutaneous three times a day before meals  insulin lispro Injectable (ADMELOG) 9 Unit(s) SubCutaneous three times a day before meals  losartan 25 milliGRAM(s) Oral daily  melatonin 5 milliGRAM(s) Oral at bedtime  metoprolol tartrate 12.5 milliGRAM(s) Oral every 6 hours  nicotine -  14 mG/24Hr(s) Patch 1 Patch Transdermal every 24 hours  pantoprazole  Injectable 40 milliGRAM(s) IV Push with breakfast  regadenoson Injectable 0.4 milliGRAM(s) IV Push once    PRN MEDICATIONS  acetaminophen     Tablet .. 650 milliGRAM(s) Oral every 6 hours PRN  albuterol/ipratropium for Nebulization 3 milliLiter(s) Nebulizer every 6 hours PRN  benzocaine/menthol Lozenge 1 Lozenge Oral every 3 hours PRN  benzonatate 100 milliGRAM(s) Oral every 8 hours PRN    VITALS:   T(F): 97.6  HR: 76  BP: 112/62  RR: 19  SpO2: 93%    PHYSICAL EXAM:  GENERAL: NAD ; laying in bed ; Better than yesterday     NECK:  (x  ) Supple     (  ) neck stiffness     (  ) nuchal rigidity     (  )  no JVD     (  ) JVD present ( -- cm)    HEART:  Rate -->     (x  ) normal rate     (  ) bradycardic     (  ) tachycardic  Rhythm -->     ( x ) regular     (  ) regularly irregular     (  ) irregularly irregular  Murmurs -->     ( x ) normal s1s2     (  ) systolic murmur     (  ) diastolic murmur     (  ) continuous murmur      (  ) S3 present     (  ) S4 present    LUNGS:   (  )Unlabored respirations     ( x ) tachypnea  (  ) B/L air entry     (  x) decreased breath sounds in:  (location Bilateral     )    (  ) no adventitious sound     ( x ) crackles     (  ) wheezing      (  ) rhonchi      (specify location:       )  ( x ) chest wall tenderness (specify location:  Left diaphrgmatic area      )    ABDOMEN:   ( x) Soft     (  ) tense   |   ( x ) nondistended     (  ) distended   |   (x  ) +BS     (  ) hypoactive bowel sounds     (  ) hyperactive bowel sounds  (  ) nontender     (  ) RUQ tenderness     (  ) RLQ tenderness     (  ) LLQ tenderness     (  ) epigastric tenderness     (  ) diffuse tenderness  (  ) Splenomegaly      (  ) Hepatomegaly      (  ) Jaundice     (  ) ecchymosis     EXTREMITIES:  (  ) Normal     (  ) Rash     (  ) ecchymosis     (  ) varicose veins      ( x ) pitting edema   +1  (  ) non-pitting edema   (  ) ulceration     (  ) gangrene:     (location:     )    NERVOUS SYSTEM:    (  x) A&Ox3     (  ) confused     (  ) lethargic  CN II-XII:     (  ) Intact     (  ) deficits found     (Specify:     )   Upper extremities:     (  ) no sensorimotor deficits     (  ) weakness     (  ) loss of proprioception/vibration     (  ) loss of touch/temperature (specify:    )  Lower extremities:     (  ) no sensorimotor deficits     (  ) weakness     (  ) loss of proprioception/vibration     (  ) loss of touch/temperature (specify:    )    SKIN:   (  ) No rashes or lesions     (  ) maculopapular rash     (  ) pustules     (  ) vesicles     (  ) ulcer     (  ) ecchymosis     (specify location:     )    LABS:                  11.6   18.54 )-----------( 392      ( 08 Jan 2025 05:36 )             34.8     01-08    133[L]  |  97[L]  |  39[H]  ----------------------------<  323[H]  4.6   |  18  |  1.2    Ca    8.4      08 Jan 2025 05:36  Mg     2.3     01-08    TPro  7.3  /  Alb  4.2  /  TBili  0.3  /  DBili  x   /  AST  26  /  ALT  29  /  AlkPhos  195[H]  01-08      Culture - Blood (collected 01-06-25 @ 11:13)  Source: .Blood BLOOD  Preliminary Report (01-07-25 @ 22:01):    No growth at 24 hours    Culture - Blood (collected 12-26-24 @ 23:37)  Source: .Blood BLOOD  Final Report (01-01-25 @ 07:00):    No growth at 5 days    Culture - Blood (collected 12-26-24 @ 23:37)  Source: .Blood BLOOD  Final Report (01-01-25 @ 07:00):    No growth at 5 days      Creatinine: 1.2 mg/dL (01-08-25 @ 05:36)  Creatinine: 1.3 mg/dL (01-07-25 @ 23:50)  Creatinine: 1.2 mg/dL (01-07-25 @ 06:08)  Creatinine: 1.1 mg/dL (01-06-25 @ 21:35)  Creatinine: 0.8 mg/dL (01-06-25 @ 05:43)  Creatinine: 0.8 mg/dL (01-05-25 @ 05:45)  Creatinine: 0.7 mg/dL (01-04-25 @ 06:06)    Procalcitonin: 0.22 ng/mL (01-06-25 @ 11:13)  Procalcitonin: 0.16 ng/mL (12-30-24 @ 10:40)    D-Dimer Assay, Quantitative: 658 ng/mL DDU (01-01-25 @ 15:49)    Ferritin: 598 ng/mL (12-31-24 @ 20:20)    C-Reactive Protein: 154.1 mg/L (01-06-25 @ 16:43)  C-Reactive Protein: 43.5 mg/L (01-04-25 @ 06:06)  C-Reactive Protein: 55.6 mg/L (01-03-25 @ 05:25)  C-Reactive Protein: 69.2 mg/L (01-02-25 @ 05:14)  C-Reactive Protein: 93.5 mg/L (01-01-25 @ 04:24)  C-Reactive Protein: 101.8 mg/L (12-31-24 @ 20:20)  C-Reactive Protein: 91.0 mg/L (12-26-24 @ 22:35)    Sedimentation Rate, Erythrocyte: 120 mm/Hr (12-26-24 @ 22:35)    WBC Count: 18.54 K/uL (01-08-25 @ 05:36)  WBC Count: 16.70 K/uL (01-07-25 @ 06:08)  WBC Count: 15.85 K/uL (01-06-25 @ 05:43)  WBC Count: 14.67 K/uL (01-05-25 @ 05:45)  WBC Count: 13.41 K/uL (01-04-25 @ 06:06)    SARS-CoV-2 Result: Detected (12-26-24 @ 22:09)    Lactate Dehydrogenase, Serum: 307 U/L (01-02-25 @ 05:14)    Alkaline Phosphatase: 195 U/L (01-08-25 @ 05:36)  Alkaline Phosphatase: 182 U/L (01-07-25 @ 06:08)  Alkaline Phosphatase: 177 U/L (01-06-25 @ 05:43)  Alkaline Phosphatase: 168 U/L (01-05-25 @ 05:45)  Alanine Aminotransferase (ALT/SGPT): 29 U/L (01-08-25 @ 05:36)  Alanine Aminotransferase (ALT/SGPT): 29 U/L (01-07-25 @ 06:08)  Alanine Aminotransferase (ALT/SGPT): 27 U/L (01-06-25 @ 05:43)  Alanine Aminotransferase (ALT/SGPT): 24 U/L (01-05-25 @ 05:45)  Aspartate Aminotransferase (AST/SGOT): 26 U/L (01-08-25 @ 05:36)  Aspartate Aminotransferase (AST/SGOT): 29 U/L (01-07-25 @ 06:08)  Aspartate Aminotransferase (AST/SGOT): 32 U/L (01-06-25 @ 05:43)  Aspartate Aminotransferase (AST/SGOT): 27 U/L (01-05-25 @ 05:45)  Bilirubin Total: 0.3 mg/dL (01-08-25 @ 05:36)  Bilirubin Total: 0.2 mg/dL (01-07-25 @ 06:08)  Bilirubin Total: 0.3 mg/dL (01-06-25 @ 05:43)  Bilirubin Total: 0.3 mg/dL (01-05-25 @ 05:45)

## 2025-01-09 NOTE — PROGRESS NOTE ADULT - ASSESSMENT
45 YO M with PMHx of HTN, IDDM, HLD, active smoker (1/2 PPD x 28 yrs), hepatitis B (s/p treatment) presented with CC chest pressure and SOB. Patient was admitted for late presentation of anterolateral MI.    IMPRESSION  #Delayed presentation [place] MI  #HF with severely reduced ejection fraction  #IDDM  #Active smoker  #VT  #LV thrombus  #Covid-19 PNA    PLAN  - Give one dose of Bumex 2 IV tonight  - strict I and O   - weigh daily   - C/W lopressor 12.5 Q 6   - C/W losartan 25 QD   - will hold off on adding Farxiga for now as he is going to need an angiogram   - Patient will need a left and right heart cath can make him NPO after MN for L and R heart catheterization tomorrow

## 2025-01-10 LAB
ALBUMIN SERPL ELPH-MCNC: 3.4 G/DL — LOW (ref 3.5–5.2)
ALP SERPL-CCNC: 211 U/L — HIGH (ref 30–115)
ALT FLD-CCNC: 60 U/L — HIGH (ref 0–41)
ANION GAP SERPL CALC-SCNC: 15 MMOL/L — HIGH (ref 7–14)
ANION GAP SERPL CALC-SCNC: 17 MMOL/L — HIGH (ref 7–14)
ANION GAP SERPL CALC-SCNC: 18 MMOL/L — HIGH (ref 7–14)
ANION GAP SERPL CALC-SCNC: 20 MMOL/L — HIGH (ref 7–14)
AST SERPL-CCNC: 44 U/L — HIGH (ref 0–41)
B-OH-BUTYR SERPL-SCNC: 0.3 MMOL/L — SIGNIFICANT CHANGE UP
BASE EXCESS BLDV CALC-SCNC: -0.5 MMOL/L — SIGNIFICANT CHANGE UP (ref -2–3)
BASOPHILS # BLD AUTO: 0.05 K/UL — SIGNIFICANT CHANGE UP (ref 0–0.2)
BASOPHILS NFR BLD AUTO: 0.2 % — SIGNIFICANT CHANGE UP (ref 0–1)
BILIRUB SERPL-MCNC: 0.2 MG/DL — SIGNIFICANT CHANGE UP (ref 0.2–1.2)
BUN SERPL-MCNC: 45 MG/DL — HIGH (ref 10–20)
BUN SERPL-MCNC: 45 MG/DL — HIGH (ref 10–20)
BUN SERPL-MCNC: 46 MG/DL — HIGH (ref 10–20)
BUN SERPL-MCNC: 48 MG/DL — HIGH (ref 10–20)
CA-I SERPL-SCNC: 1.11 MMOL/L — LOW (ref 1.15–1.33)
CALCIUM SERPL-MCNC: 8.1 MG/DL — LOW (ref 8.4–10.5)
CALCIUM SERPL-MCNC: 8.3 MG/DL — LOW (ref 8.4–10.4)
CALCIUM SERPL-MCNC: 8.5 MG/DL — SIGNIFICANT CHANGE UP (ref 8.4–10.5)
CALCIUM SERPL-MCNC: 8.6 MG/DL — SIGNIFICANT CHANGE UP (ref 8.4–10.5)
CHLORIDE SERPL-SCNC: 92 MMOL/L — LOW (ref 98–110)
CHLORIDE SERPL-SCNC: 93 MMOL/L — LOW (ref 98–110)
CHLORIDE SERPL-SCNC: 97 MMOL/L — LOW (ref 98–110)
CHLORIDE SERPL-SCNC: 98 MMOL/L — SIGNIFICANT CHANGE UP (ref 98–110)
CO2 SERPL-SCNC: 17 MMOL/L — SIGNIFICANT CHANGE UP (ref 17–32)
CO2 SERPL-SCNC: 18 MMOL/L — SIGNIFICANT CHANGE UP (ref 17–32)
CO2 SERPL-SCNC: 19 MMOL/L — SIGNIFICANT CHANGE UP (ref 17–32)
CO2 SERPL-SCNC: 20 MMOL/L — SIGNIFICANT CHANGE UP (ref 17–32)
CREAT SERPL-MCNC: 1.1 MG/DL — SIGNIFICANT CHANGE UP (ref 0.7–1.5)
CREAT SERPL-MCNC: 1.3 MG/DL — SIGNIFICANT CHANGE UP (ref 0.7–1.5)
CREAT SERPL-MCNC: 1.3 MG/DL — SIGNIFICANT CHANGE UP (ref 0.7–1.5)
CREAT SERPL-MCNC: 1.4 MG/DL — SIGNIFICANT CHANGE UP (ref 0.7–1.5)
EGFR: 64 ML/MIN/1.73M2 — SIGNIFICANT CHANGE UP
EGFR: 69 ML/MIN/1.73M2 — SIGNIFICANT CHANGE UP
EGFR: 69 ML/MIN/1.73M2 — SIGNIFICANT CHANGE UP
EGFR: 85 ML/MIN/1.73M2 — SIGNIFICANT CHANGE UP
EOSINOPHIL # BLD AUTO: 0 K/UL — SIGNIFICANT CHANGE UP (ref 0–0.7)
EOSINOPHIL NFR BLD AUTO: 0 % — SIGNIFICANT CHANGE UP (ref 0–8)
FUNGITELL: <31 PG/ML — SIGNIFICANT CHANGE UP
GAS PNL BLDV: 129 MMOL/L — LOW (ref 136–145)
GAS PNL BLDV: SIGNIFICANT CHANGE UP
GAS PNL BLDV: SIGNIFICANT CHANGE UP
GLUCOSE BLDC GLUCOMTR-MCNC: 179 MG/DL — HIGH (ref 70–99)
GLUCOSE BLDC GLUCOMTR-MCNC: 237 MG/DL — HIGH (ref 70–99)
GLUCOSE BLDC GLUCOMTR-MCNC: 272 MG/DL — HIGH (ref 70–99)
GLUCOSE BLDC GLUCOMTR-MCNC: 307 MG/DL — HIGH (ref 70–99)
GLUCOSE BLDC GLUCOMTR-MCNC: 327 MG/DL — HIGH (ref 70–99)
GLUCOSE BLDC GLUCOMTR-MCNC: 336 MG/DL — HIGH (ref 70–99)
GLUCOSE BLDC GLUCOMTR-MCNC: 344 MG/DL — HIGH (ref 70–99)
GLUCOSE BLDC GLUCOMTR-MCNC: 357 MG/DL — HIGH (ref 70–99)
GLUCOSE BLDC GLUCOMTR-MCNC: 361 MG/DL — HIGH (ref 70–99)
GLUCOSE BLDC GLUCOMTR-MCNC: 364 MG/DL — HIGH (ref 70–99)
GLUCOSE BLDC GLUCOMTR-MCNC: 432 MG/DL — HIGH (ref 70–99)
GLUCOSE BLDC GLUCOMTR-MCNC: 450 MG/DL — CRITICAL HIGH (ref 70–99)
GLUCOSE BLDC GLUCOMTR-MCNC: 485 MG/DL — CRITICAL HIGH (ref 70–99)
GLUCOSE BLDC GLUCOMTR-MCNC: 506 MG/DL — CRITICAL HIGH (ref 70–99)
GLUCOSE BLDC GLUCOMTR-MCNC: 588 MG/DL — CRITICAL HIGH (ref 70–99)
GLUCOSE BLDC GLUCOMTR-MCNC: 594 MG/DL — CRITICAL HIGH (ref 70–99)
GLUCOSE BLDC GLUCOMTR-MCNC: 596 MG/DL — CRITICAL HIGH (ref 70–99)
GLUCOSE BLDC GLUCOMTR-MCNC: >600 MG/DL — CRITICAL HIGH (ref 70–99)
GLUCOSE BLDC GLUCOMTR-MCNC: >600 MG/DL — CRITICAL HIGH (ref 70–99)
GLUCOSE SERPL-MCNC: 292 MG/DL — HIGH (ref 70–99)
GLUCOSE SERPL-MCNC: 308 MG/DL — HIGH (ref 70–99)
GLUCOSE SERPL-MCNC: 660 MG/DL — CRITICAL HIGH (ref 70–99)
GLUCOSE SERPL-MCNC: 661 MG/DL — CRITICAL HIGH (ref 70–99)
HCO3 BLDV-SCNC: 24 MMOL/L — SIGNIFICANT CHANGE UP (ref 22–29)
HCT VFR BLD CALC: 37.3 % — LOW (ref 42–52)
HCT VFR BLDA CALC: 40 % — SIGNIFICANT CHANGE UP (ref 39–51)
HGB BLD CALC-MCNC: 13.3 G/DL — SIGNIFICANT CHANGE UP (ref 12.6–17.4)
HGB BLD-MCNC: 12.3 G/DL — LOW (ref 14–18)
IMM GRANULOCYTES NFR BLD AUTO: 1.6 % — HIGH (ref 0.1–0.3)
LACTATE BLDV-MCNC: 3.5 MMOL/L — HIGH (ref 0.5–2)
LYMPHOCYTES # BLD AUTO: 2 K/UL — SIGNIFICANT CHANGE UP (ref 1.2–3.4)
LYMPHOCYTES # BLD AUTO: 8.5 % — LOW (ref 20.5–51.1)
MAGNESIUM SERPL-MCNC: 2.3 MG/DL — SIGNIFICANT CHANGE UP (ref 1.8–2.4)
MCHC RBC-ENTMCNC: 30.9 PG — SIGNIFICANT CHANGE UP (ref 27–31)
MCHC RBC-ENTMCNC: 33 G/DL — SIGNIFICANT CHANGE UP (ref 32–37)
MCV RBC AUTO: 93.7 FL — SIGNIFICANT CHANGE UP (ref 80–94)
MONOCYTES # BLD AUTO: 1.45 K/UL — HIGH (ref 0.1–0.6)
MONOCYTES NFR BLD AUTO: 6.2 % — SIGNIFICANT CHANGE UP (ref 1.7–9.3)
NEUTROPHILS # BLD AUTO: 19.6 K/UL — HIGH (ref 1.4–6.5)
NEUTROPHILS NFR BLD AUTO: 83.5 % — HIGH (ref 42.2–75.2)
NRBC # BLD: 0 /100 WBCS — SIGNIFICANT CHANGE UP (ref 0–0)
PCO2 BLDV: 39 MMHG — LOW (ref 42–55)
PH BLDV: 7.4 — SIGNIFICANT CHANGE UP (ref 7.32–7.43)
PHOSPHATE SERPL-MCNC: 4 MG/DL — SIGNIFICANT CHANGE UP (ref 2.1–4.9)
PLATELET # BLD AUTO: 472 K/UL — HIGH (ref 130–400)
PMV BLD: 9.7 FL — SIGNIFICANT CHANGE UP (ref 7.4–10.4)
PO2 BLDV: 33 MMHG — SIGNIFICANT CHANGE UP (ref 25–45)
POTASSIUM BLDV-SCNC: 4.4 MMOL/L — SIGNIFICANT CHANGE UP (ref 3.5–5.1)
POTASSIUM SERPL-MCNC: 4.1 MMOL/L — SIGNIFICANT CHANGE UP (ref 3.5–5)
POTASSIUM SERPL-MCNC: 4.2 MMOL/L — SIGNIFICANT CHANGE UP (ref 3.5–5)
POTASSIUM SERPL-MCNC: 4.6 MMOL/L — SIGNIFICANT CHANGE UP (ref 3.5–5)
POTASSIUM SERPL-MCNC: 4.7 MMOL/L — SIGNIFICANT CHANGE UP (ref 3.5–5)
POTASSIUM SERPL-SCNC: 4.1 MMOL/L — SIGNIFICANT CHANGE UP (ref 3.5–5)
POTASSIUM SERPL-SCNC: 4.2 MMOL/L — SIGNIFICANT CHANGE UP (ref 3.5–5)
POTASSIUM SERPL-SCNC: 4.6 MMOL/L — SIGNIFICANT CHANGE UP (ref 3.5–5)
POTASSIUM SERPL-SCNC: 4.7 MMOL/L — SIGNIFICANT CHANGE UP (ref 3.5–5)
PROT SERPL-MCNC: 6.7 G/DL — SIGNIFICANT CHANGE UP (ref 6–8)
RBC # BLD: 3.98 M/UL — LOW (ref 4.7–6.1)
RBC # FLD: 12.7 % — SIGNIFICANT CHANGE UP (ref 11.5–14.5)
SAO2 % BLDV: 50.1 % — LOW (ref 67–88)
SODIUM SERPL-SCNC: 127 MMOL/L — LOW (ref 135–146)
SODIUM SERPL-SCNC: 131 MMOL/L — LOW (ref 135–146)
SODIUM SERPL-SCNC: 132 MMOL/L — LOW (ref 135–146)
SODIUM SERPL-SCNC: 134 MMOL/L — LOW (ref 135–146)
WBC # BLD: 23.47 K/UL — HIGH (ref 4.8–10.8)
WBC # FLD AUTO: 23.47 K/UL — HIGH (ref 4.8–10.8)

## 2025-01-10 PROCEDURE — 93456 R HRT CORONARY ARTERY ANGIO: CPT | Mod: 26

## 2025-01-10 PROCEDURE — 99233 SBSQ HOSP IP/OBS HIGH 50: CPT | Mod: 25

## 2025-01-10 PROCEDURE — 71045 X-RAY EXAM CHEST 1 VIEW: CPT | Mod: 26

## 2025-01-10 PROCEDURE — 99291 CRITICAL CARE FIRST HOUR: CPT

## 2025-01-10 RX ORDER — INSULIN HUMAN 100 [IU]/ML
10 INJECTION, SOLUTION SUBCUTANEOUS ONCE
Refills: 0 | Status: COMPLETED | OUTPATIENT
Start: 2025-01-10 | End: 2025-01-10

## 2025-01-10 RX ORDER — SODIUM CHLORIDE 9 MG/ML
500 INJECTION, SOLUTION INTRAMUSCULAR; INTRAVENOUS; SUBCUTANEOUS ONCE
Refills: 0 | Status: COMPLETED | OUTPATIENT
Start: 2025-01-10 | End: 2025-01-10

## 2025-01-10 RX ORDER — INSULIN HUMAN 100 [IU]/ML
9 INJECTION, SOLUTION SUBCUTANEOUS
Qty: 100 | Refills: 0 | Status: DISCONTINUED | OUTPATIENT
Start: 2025-01-10 | End: 2025-01-11

## 2025-01-10 RX ORDER — INSULIN LISPRO 100/ML
24 VIAL (ML) SUBCUTANEOUS
Refills: 0 | Status: DISCONTINUED | OUTPATIENT
Start: 2025-01-10 | End: 2025-01-12

## 2025-01-10 RX ORDER — INSULIN LISPRO 100/ML
6 VIAL (ML) SUBCUTANEOUS ONCE
Refills: 0 | Status: COMPLETED | OUTPATIENT
Start: 2025-01-10 | End: 2025-01-10

## 2025-01-10 RX ORDER — ASPIRIN 81 MG
324 TABLET, DELAYED RELEASE (ENTERIC COATED) ORAL ONCE
Refills: 0 | Status: COMPLETED | OUTPATIENT
Start: 2025-01-10 | End: 2025-01-10

## 2025-01-10 RX ORDER — DEXTROSE MONOHYDRATE 25 G/50ML
50 INJECTION, SOLUTION INTRAVENOUS
Refills: 0 | Status: DISCONTINUED | OUTPATIENT
Start: 2025-01-10 | End: 2025-01-16

## 2025-01-10 RX ORDER — SODIUM CHLORIDE 9 MG/ML
1000 INJECTION, SOLUTION INTRAMUSCULAR; INTRAVENOUS; SUBCUTANEOUS
Refills: 0 | Status: DISCONTINUED | OUTPATIENT
Start: 2025-01-10 | End: 2025-01-11

## 2025-01-10 RX ADMIN — Medication 6 UNIT(S): at 02:28

## 2025-01-10 RX ADMIN — Medication 324 MILLIGRAM(S): at 08:44

## 2025-01-10 RX ADMIN — ACETAMINOPHEN 650 MILLIGRAM(S): 80 SOLUTION/ DROPS ORAL at 06:25

## 2025-01-10 RX ADMIN — Medication 12.5 MILLIGRAM(S): at 05:08

## 2025-01-10 RX ADMIN — INSULIN HUMAN 9 UNIT(S)/HR: 100 INJECTION, SOLUTION SUBCUTANEOUS at 14:59

## 2025-01-10 RX ADMIN — Medication 100 MILLIGRAM(S): at 17:47

## 2025-01-10 RX ADMIN — NICOTINE POLACRILEX 1 PATCH: 4 LOZENGE ORAL at 20:04

## 2025-01-10 RX ADMIN — ENOXAPARIN SODIUM 100 MILLIGRAM(S): 60 INJECTION INTRAVENOUS; SUBCUTANEOUS at 17:47

## 2025-01-10 RX ADMIN — ACETAMINOPHEN 650 MILLIGRAM(S): 80 SOLUTION/ DROPS ORAL at 05:25

## 2025-01-10 RX ADMIN — Medication 100 MILLIGRAM(S): at 06:12

## 2025-01-10 RX ADMIN — PANTOPRAZOLE 40 MILLIGRAM(S): 40 TABLET, DELAYED RELEASE ORAL at 07:43

## 2025-01-10 RX ADMIN — SODIUM CHLORIDE 100 MILLILITER(S): 9 INJECTION, SOLUTION INTRAMUSCULAR; INTRAVENOUS; SUBCUTANEOUS at 15:00

## 2025-01-10 RX ADMIN — Medication 100 MILLIGRAM(S): at 14:59

## 2025-01-10 RX ADMIN — SODIUM CHLORIDE 100 MILLILITER(S): 9 INJECTION, SOLUTION INTRAMUSCULAR; INTRAVENOUS; SUBCUTANEOUS at 21:16

## 2025-01-10 RX ADMIN — Medication 100 MILLIGRAM(S): at 21:14

## 2025-01-10 RX ADMIN — ACETAMINOPHEN 650 MILLIGRAM(S): 80 SOLUTION/ DROPS ORAL at 21:15

## 2025-01-10 RX ADMIN — Medication 5 MILLIGRAM(S): at 21:15

## 2025-01-10 RX ADMIN — Medication 12: at 12:59

## 2025-01-10 RX ADMIN — INSULIN HUMAN 10 UNIT(S): 100 INJECTION, SOLUTION SUBCUTANEOUS at 19:11

## 2025-01-10 RX ADMIN — CHLORHEXIDINE GLUCONATE 1 APPLICATION(S): 1.2 RINSE ORAL at 05:26

## 2025-01-10 RX ADMIN — NICOTINE POLACRILEX 1 PATCH: 4 LOZENGE ORAL at 08:12

## 2025-01-10 RX ADMIN — NICOTINE POLACRILEX 1 PATCH: 4 LOZENGE ORAL at 06:00

## 2025-01-10 RX ADMIN — Medication 102 MILLIGRAM(S): at 05:42

## 2025-01-10 RX ADMIN — BENZOCAINE AND MENTHOL 1 LOZENGE: 15; 3.6 LOZENGE ORAL at 03:19

## 2025-01-10 RX ADMIN — ACETAMINOPHEN 650 MILLIGRAM(S): 80 SOLUTION/ DROPS ORAL at 23:22

## 2025-01-10 RX ADMIN — BENZOCAINE AND MENTHOL 1 LOZENGE: 15; 3.6 LOZENGE ORAL at 21:16

## 2025-01-10 RX ADMIN — INSULIN HUMAN 10 UNIT(S): 100 INJECTION, SOLUTION SUBCUTANEOUS at 13:54

## 2025-01-10 RX ADMIN — LOSARTAN POTASSIUM 25 MILLIGRAM(S): 100 TABLET, FILM COATED ORAL at 05:08

## 2025-01-10 RX ADMIN — AMIODARONE HYDROCHLORIDE 400 MILLIGRAM(S): 200 TABLET ORAL at 05:08

## 2025-01-10 RX ADMIN — TORSEMIDE 40 MILLIGRAM(S): 10 TABLET ORAL at 05:08

## 2025-01-10 RX ADMIN — INSULIN HUMAN 9 UNIT(S)/HR: 100 INJECTION, SOLUTION SUBCUTANEOUS at 21:15

## 2025-01-10 RX ADMIN — CLOPIDOGREL BISULFATE 75 MILLIGRAM(S): 75 TABLET, FILM COATED ORAL at 08:44

## 2025-01-10 RX ADMIN — Medication 24 UNIT(S): at 12:58

## 2025-01-10 RX ADMIN — INSULIN HUMAN 10 UNIT(S): 100 INJECTION, SOLUTION SUBCUTANEOUS at 17:22

## 2025-01-10 RX ADMIN — Medication 100 MILLIGRAM(S): at 05:08

## 2025-01-10 RX ADMIN — AMIODARONE HYDROCHLORIDE 400 MILLIGRAM(S): 200 TABLET ORAL at 17:57

## 2025-01-10 RX ADMIN — INSULIN GLARGINE-YFGN 40 UNIT(S): 100 INJECTION, SOLUTION SUBCUTANEOUS at 21:14

## 2025-01-10 RX ADMIN — NICOTINE POLACRILEX 1 PATCH: 4 LOZENGE ORAL at 06:05

## 2025-01-10 RX ADMIN — Medication 8: at 07:43

## 2025-01-10 RX ADMIN — SODIUM CHLORIDE 1000 MILLILITER(S): 9 INJECTION, SOLUTION INTRAMUSCULAR; INTRAVENOUS; SUBCUTANEOUS at 11:10

## 2025-01-10 NOTE — CHART NOTE - NSCHARTNOTEFT_GEN_A_CORE
PRE-OP DIAGNOSIS:    Delayed presentation anterior Wall MI    PROCEDURE:     [x] Coronary Angiogram     [x] LHC     [] LVG     [X] RHC     [] Intervention (see below)         PHYSICIAN: Eduardo Flores      ASSISTANT: Dr JUDAH Rosales     PROCEDURE DESCRIPTION:     Consent:      [x] Patient     [] Family Member     []  Used        Anesthesia:     [] General     [x] Sedation     [x] Local        Access & Closure:     [x] 6 Fr Right Radial Artery (TR band closure)     [] Fr Femoral Artery     [] Fr Femoral Vein     [X] 6 Fr Brachial Vein (manual compression)      IV Contrast: 40 mL        Intervention: None      Implants: None       FINDINGS:     Coronary Dominance: Right      LM: No disease    LAD: Mid LAD with 90% lesion noted  - Distal LAD with severe diffuse disease  D1: severe disease in the ostial Diagonal branch of the LAD    CX: minor luminal irregularities small vessel  OM1: 70% lesion noted in the OM1    Ramus: small vessel minor luminal irregularities    RCA: Distal RCA with mild atherosclerosis noted  RPDA: Minor Luminal irregularities  RPL: Large vessel with minor luminal irregularities       LVEDP: Not obtained    EF: 30 %       RHC Findings:     RA: 4/2 (3)  RV: 20/2 (3)  PA: 25/10 (15)  PAWP: 3    PA saturation: 51.9%  Arterial saturation: 91.1%  MAGGIE CO/CI: 3.49/1.67    SVR: 1398.79 dyn*s/cm5  PVR: 3.15 MAN         ESTIMATED BLOOD LOSS: < 10 mL        CONDITION:     [x] Good     [] Fair     [] Critical        SPECIMEN REMOVED: N/A       POST-OP DIAGNOSIS:      [X] Patient had transient hypotension in the procedure after the vasodilatory cocktail containing nitroglycerin and verapamil that resolved with levophed and IV fluid boluses. The levophed was weaned off by the end of the case  [X] Severe 2 Vessel Coronary Artery Disease in the LAD and OM1   [X] low filling pressures and low CO/CI       PLAN OF CARE:       [X] Return to In-patient bed     [x] Medications: Aspirin 81 mg qd, Lovenox 100 Q12 for LV thrombus, Lipitor 80mg qd, discontinue lopressor due to low CO/CI    [X] IV Fluids: got 500 cc Iv fluids in the procedure no more Iv fluids due to low CO PRE-OP DIAGNOSIS:    Delayed presentation anterior Wall MI    PROCEDURE:     [x] Coronary Angiogram     [x] LHC     [] LVG     [X] RHC     [] Intervention (see below)         PHYSICIAN: Eduardo Flores      ASSISTANT: Dr JUDAH Rosales     PROCEDURE DESCRIPTION:     Consent:      [x] Patient     [] Family Member     []  Used        Anesthesia:     [] General     [x] Sedation     [x] Local        Access & Closure:     [x] 6 Fr Right Radial Artery (TR band closure)     [] Fr Femoral Artery     [] Fr Femoral Vein     [X] 6 Fr Brachial Vein (manual compression)      IV Contrast: 40 mL        Intervention: None      Implants: None       FINDINGS:     Coronary Dominance: Right      LM: No disease    LAD: Mid LAD with 90% lesion noted  - Distal LAD with severe diffuse disease  D1: severe disease in the ostial Diagonal branch of the LAD    CX: minor luminal irregularities small vessel  OM1: 70% lesion noted in the OM1    Ramus: small vessel minor luminal irregularities    RCA: Distal RCA with mild atherosclerosis noted  RPDA: Minor Luminal irregularities  RPL: Large vessel with minor luminal irregularities       LVEDP: Not obtained    EF: 30 %       RHC Findings:     RA: 4/2 (3)  RV: 20/2 (3)  PA: 25/10 (15)  PAWP: 3    PA saturation: 51.9%  Arterial saturation: 91.1%  MAGGIE CO/CI: 3.49/1.67    SVR: 1398.79 dyn*s/cm5  PVR: 3.15 MAN         ESTIMATED BLOOD LOSS: < 10 mL        CONDITION:     [x] Good     [] Fair     [] Critical        SPECIMEN REMOVED: N/A       POST-OP DIAGNOSIS:      [X] Patient had transient hypotension in the procedure after the vasodilatory cocktail containing nitroglycerin and verapamil that resolved with levophed and IV fluid boluses. The levophed was weaned off by the end of the case  [X] Severe 2 Vessel Coronary Artery Disease in the LAD and OM1   [X] low filling pressures and low CO/CI       PLAN OF CARE:       [X] Return to In-patient bed     [x] Medications: Aspirin 81 mg qd, Lovenox 100 Q12 for LV thrombus, Lipitor 80mg qd, discontinue lopressor due to low CO/CI    [X] Plan for staged intervention to the LAD when his pulmonary status improves.     [X] IV Fluids: got 500 cc Iv fluids in the procedure no more Iv fluids due to low CO

## 2025-01-10 NOTE — PROGRESS NOTE ADULT - SUBJECTIVE AND OBJECTIVE BOX
SUBJECTIVE:  HPI:  44M with HTN, IDDM, HLD, active smoker (1/2 PPD x 28 yrs), hepatitis B (s/p treatment) presents with CC of chest pressure and SOB. 2 weeks ago, he started having cough w/ yellow and white sputum runny nose, generalized body aches. Then started developing REGAN about a week ago along with left sided chest pressure, initially about 6-7/10. The chest pressure/SOB are worse with laying flat, improve with leaning forward. No nausea vomiting, reports loose BM 1-2 per day x 2 weeks. Reports burning at end of urination about a week ago which has resolved.  His last alcoholic drink was 3 weeks ago. Also uses Timothy Dust occasionally, last use few weeks ago. Denies any injected drugs or cocaine.  Has not taken his meds including insulin in the last 2 weeks due to feeling sick and not eating much.    Triage VS: /98, , RR 26, SpO2 92% on RA, T 98.6F.  Labs: WBC 11.77k, D-dimer 543, CRP 91, HS troponin 400, pro-BNP 4126.  UA w/ 100 protein, 500 glucose, otherwise negative.  SARS-CoV-2 positive.  CXR w/ bilateral opacities.    ED interventions: ASA 325mg, ticagrelor 180mg, DuoNeb, ketorolac, 125mg IV methylprednisolone, 80mg IV furosemide. Was also placed on BIPAP which he did not tolerate (made him very anxious), despite repeated counseling. (27 Dec 2024 00:27)      Patient is a 44y old Male who presents with a chief complaint of MI (04 Jan 2025 12:40)    Currently admitted to medicine with the primary diagnosis of ACS (acute coronary syndrome)       Today is hospital day 15d.     PAST MEDICAL & SURGICAL HISTORY  HTN (hypertension)    Hepatitis B    DM (diabetes mellitus)        ALLERGIES:  Seafood (Urticaria)  No Known Drug Allergies  shellfish (Urticaria; Rash; Short breath)    MEDICATIONS:  ACTIVE MEDICATIONS  acetaminophen     Tablet .. 650 milliGRAM(s) Oral every 6 hours PRN  albuterol/ipratropium for Nebulization 3 milliLiter(s) Nebulizer every 6 hours PRN  aMIOdarone    Tablet 400 milliGRAM(s) Oral two times a day  benzocaine/menthol Lozenge 1 Lozenge Oral every 3 hours PRN  benzonatate 100 milliGRAM(s) Oral every 8 hours PRN  cefepime   IVPB      cefepime   IVPB 2000 milliGRAM(s) IV Intermittent every 8 hours  cetirizine 10 milliGRAM(s) Oral daily  chlorhexidine 2% Cloths 1 Application(s) Topical daily  clopidogrel Tablet 75 milliGRAM(s) Oral daily  dexAMETHasone  IVPB 10 milliGRAM(s) IV Intermittent daily  doxycycline IVPB      doxycycline IVPB 100 milliGRAM(s) IV Intermittent every 12 hours  enoxaparin Injectable 100 milliGRAM(s) SubCutaneous every 12 hours  glucagon  Injectable 1 milliGRAM(s) IntraMuscular once  guaifenesin/dextromethorphan Oral Liquid 10 milliLiter(s) Oral every 4 hours PRN  insulin glargine Injectable (LANTUS) 40 Unit(s) SubCutaneous at bedtime  insulin lispro (ADMELOG) corrective regimen sliding scale   SubCutaneous three times a day before meals  insulin lispro Injectable (ADMELOG) 24 Unit(s) SubCutaneous three times a day before meals  losartan 25 milliGRAM(s) Oral daily  melatonin 5 milliGRAM(s) Oral at bedtime  metoprolol tartrate 12.5 milliGRAM(s) Oral every 6 hours  nicotine -  14 mG/24Hr(s) Patch 1 Patch Transdermal every 24 hours  pantoprazole  Injectable 40 milliGRAM(s) IV Push with breakfast  regadenoson Injectable 0.4 milliGRAM(s) IV Push once  torsemide 40 milliGRAM(s) Oral daily      VITALS:   T(F): 97.8  HR: 64  BP: 119/72  RR: 16  SpO2: 94%    LABS:                        12.3   23.47 )-----------( 472      ( 10 Minesh 2025 05:25 )             37.3     01-10    134[L]  |  98  |  48[H]  ----------------------------<  308[H]  4.2   |  19  |  1.1    Ca    8.3[L]      10 Minesh 2025 05:25  Mg     2.3     01-10    TPro  6.7  /  Alb  3.4[L]  /  TBili  0.2  /  DBili  x   /  AST  44[H]  /  ALT  60[H]  /  AlkPhos  211[H]  01-10      Urinalysis Basic - ( 10 Minesh 2025 05:25 )    Color: x / Appearance: x / SG: x / pH: x  Gluc: 308 mg/dL / Ketone: x  / Bili: x / Urobili: x   Blood: x / Protein: x / Nitrite: x   Leuk Esterase: x / RBC: x / WBC x   Sq Epi: x / Non Sq Epi: x / Bacteria: x        Lactate, Blood: 2.6 mmol/L *H* (01-09-25 @ 22:07)  Lactate, Blood: 2.5 mmol/L *H* (01-09-25 @ 17:25)              PHYSICAL EXAM:  GEN: No acute distress, on 5.5 L NC   LUNGS: dec b/l air entry with basal crackles   HEART: S1/S2 present.  no murmurs   ABD: Soft, non-tender, non-distended.   EXT: No pedal edema  NEURO: AAOX3

## 2025-01-10 NOTE — CHART NOTE - NSCHARTNOTEFT_GEN_A_CORE
Events noted  ARDS-dose steroids started on 1/7   Was weaned to nasal canula and underwent LHC today.   Will be transferred to CCU for hyperglycemia/Insulin drip.   Continue cefepime over weekend for now .     Please call or message on [employer] Teams if with any questions.  Spectra [phone]

## 2025-01-10 NOTE — PROGRESS NOTE ADULT - ASSESSMENT
44-year-old male past medical history hypertension, hyperlipidemia, diabetes, active smoker presents with dyspnea and chest pain.  Patient with recent COVID infection.  Patient found to have an NSTEMI and echo with severely reduced LV systolic function.  WMA in significant LAD territory.    Given multiple risk factors patient most likely has newly diagnosed ischemic cardiomyopathy and late MI. Chest x-ray reviewed with improving infiltrates.     LHC/RHC demonstrated severe stenosis of mid LAD, likely culprit. CO/CI mildly depressed likely in setting of low filling pressures. Given respiratory status is not optimized and multiple electrolyte imbalances, will hold off any intervention. Can consider next week if other issues resolved. However, this is an old infarct and based on OAT trial, medical management can be appropriate too.     On exam he is warm and well-perfused.  He is dry and recommend stopping all diuretics. Given low output, would stop BB. Recommend continuing losartan.    Eduardo Jewell MD  Interventional Cardiology/Advanced Heart Failure Transplant .

## 2025-01-10 NOTE — CHART NOTE - NSCHARTNOTEFT_GEN_A_CORE
INTERVENTIONAL CARDIOLOGY:                                               PREOPERATIVE DAY OF PROCEDURE EVALUATION:  I have personally seen and examined the patient.  I agree with the history and physical which I have reviewed and noted any changes below.     45 YO M with PMHx of HTN, IDDM, HLD, active smoker (1/2 PPD x 28 yrs), hepatitis B (s/p treatment), who presented with c/o chest pressure and SOB, admitted for late presentation of anterolateral MI, with acute HFrEF, EF 25-30%, being diuresed. Pt also had episodes of VT on this admission, placed om Amio. And being tx with IV Abx for PNA.  Pt is now referred for [facility] with possible intervention if clinically indicated.      # LV thrombus - on therapeutic Lovx, last dose on 1/9 @ 5p  -ASA 81mg po x 1  -received Plavix today  -shellfish allergy, already on Decadron, will give 50mg IV benadryl on the table       Pre cath note:  indication:  [ ] STEMI                [ x] NSTEMI                 [ ] Acute coronary syndrome                   [x ]Unstable Angina   [ ] high risk  [x ] intermediate risk  [ ] low risk                   [ ] Stable Angina     non-invasive testing:                          Date:                     result: [ ] high risk  [ ] intermediate risk  [ ] low risk    Anti- Anginal medications:                    [ ] not used d/t                     [x ] used   ( x) BB     ( ) CCB      ( ) Nitrate   (  ) Ranexa          [ ] not used but strong indication not to use  -No 2nd antianginal due to soft BP    Ejection Fraction                   [x ] <29            [ ] 30-39%   [ ] 40-49%     [ ]>50%    CHF          [x ] active (within last 14 days on meds   [ ] Chronic (on meds but no exacerbation)  NYHA Functional Class:  (  ) Class I (no limitations)  (  ) Class II (slight limitation)  (x  ) Class III (marked limitation)  (  ) Class IV (symptoms at rest)    COPD                   [ ] mild (on chronic bronchodilators)  [ ] moderate (on chronic steroid therapy)      [ ] severe (indication for home O2 or PACO2 >50)    Other risk factors:                     [x ] Previous MI                     [ ] CVA/ stroke                    [ ] carotid stent/ CEA                    [ ] PVD/PAD- (arterial aneurysm, non-palpable pulses, tortuous vessel with inability to insert catheter, infra-renal dissection, renal or subclavian artery stenosis)                    [ ] previous CABG                    [ ] Renal Failure:  on HD  (  ) yes  (  ) no                    [ ] Diabetic  (  x) Type 1  (  ) Type 2                                         (x  ) Insulin dependent  (  ) non-insulin dependent                                         (  x) Metformin  (  ) Januvia  (  ) Glimepiride  (  ) Glipizide  (  ) Glyburide  (  ) Actos                                         (  ) GLP-1 receptor agonists (Ozempic, Wegovy, Zepbound, Mounjaro, Trulicity, Byetta, Victoza)                                         (  ) SGLT2 Inhibitors (Farxiga, Jardiance, Invokana)                                         (  ) Other      Bleeding Risk: 0.7%    Pre-cath Hydration:                                         ( x ) No precath hydration d/t severely decreased EF and on 5L O2      RIGHT RADIAL ARTERY EVALUATION:  GARRETT TEST: [] Negative          [x] Positive    VS: 119/72, 62, 16, 94% on 5L O2  ECG 1/6: NSR @ 73 bpm, flipped Ts in V1-6         (Signed electronically by __________)  01-10-25 @ 08:39

## 2025-01-10 NOTE — PROGRESS NOTE ADULT - ASSESSMENT
43 YO M with PMHx of HTN, IDDM, HLD, active smoker (1/2 PPD x 28 yrs), hepatitis B (s/p treatment) presented with CC chest pressure and SOB. [facility]      EVENTS:   1/6: Patient has dyspnea ; cough with reddish sputum production. Saturating 90-91% on 4 L NC . Increased oxygen requirement. Bumex drip started 0.5mg/hr.   1/7: HFNC setting at 50L/65% ; satting 95-97% ; RR 22 ; Bumex drip increased to 1 mg/hr. Albumin 200ml q 12 hours for 2 doses started. Steroids started   1/8: Diuretics stopped; Oxygen requirement decreased to Fio2 38%. Feels Better ; Euvolemic on examination ; Diuretics : Torsemide.   1/9 : Patient vitally stable. One dose Bumex 2 gm at night.   1/10 ; switched diuresis to PO torsemide 40 mg once daily , pt is on 5.5 L NC , going to cath today     # Possible ARDS on admission   # Late presentation anterior MI   # Acute HFrEF, ICM, EF 25-30%  # Sustained monomorphic slow Vtach; 12 min overnight on 12/28 with rate ~ 140s  # LV thrombus  # HTN  - discontinued ASA 81 QD   - On plavix 75mg qd, Lovenox 100mg q12  - cw losartan 25 mg daily  - cw Metoprolol 12.5mg PO Q6  - s/p lidocaine  and s/p amio drip for vtach , now on PO , c/w amiodarone 400 mg BID PO. today day 13  - Viability study showed nonviable myocardium  - EP following for VT  - Procal 0.22   - MRSA swab > Negative   - Pulm consulted; minimal pleural effusion ;  On IV steroids ARDS dosing   - HF team following . approaching euvolemia, now on PO torsemide 40 mg QD ( Net negative UO about 3 L )   - ID consult appreciated  - : steroids started ARDS dose ; Decadron 20 mg IV>>changed to 10 mg ( Hyperglycemia)   - Monitor intake and output   - BMP BID   - Keep K > 4.0 and Mag > 2.0.     #HLD  #IDDM w/ hyperglycemia  -TSH 0.99  -A1c 10.8 - DM control  -  -Hyperglycemic worsening due to steroids.   -adjust insulin reg to keep -180    DVT PPX: Lovenox  Diet: DASH CC with fluid restriction  DISPO: Cardiac SDU    PENDING:   cardiac cath today

## 2025-01-10 NOTE — CHART NOTE - NSCHARTNOTEFT_GEN_A_CORE
CCU upgrade note    43 YO M with PMHx of HTN, IDDM, HLD, active smoker (1/2 PPD x 28 yrs), hepatitis B (s/p treatment) presented with CC chest pressure and SOB. Patient was admitted for late presentation of anterolateral MI [facility] after respiratory status optimization.      EVENTS:   1/6: Patient has dyspnea ; cough with reddish sputum production. Saturating 90-91% on 4 L NC . Increased oxygen requirement. Bumex drip started 0.5mg/hr.   1/7: HFNC setting at 50L/65% ; satting 95-97% ; RR 22 ; Bumex drip increased to 1 mg/hr. Albumin 200ml q 12 hours for 2 doses started. Steroids started   1/8: Diuretics stopped; Oxygen requirement decreased to Fio2 38%. Feels Better ; Euvolemic on examination ; Diuretics : Torsemide.   1/9 : Patient vitally stable. One dose Bumex 2 gm at night.     Upgrade event:   1/10 ; in AM switched diuresis to PO torsemide 40 mg once daily , pt is on 5.5 L NC , went to cath today ,cath findings showing severe 2 VD in the LAD and OM1 with no intervention, with plans for staged intervention to the LAD when his pulmonary status improves, also his filling pressures were low and was hypotensive, given fluids and his metoprolol and torsemide weree held.   in cath FS checked was 600, repeat still elevated, transfer to CCU for Insulin drip,     rest of stay :     # Possible ARDS on admission   # Late presentation anterior MI   # Acute HFrEF, ICM, EF 25-30%  # Sustained monomorphic slow Vtach; 12 min overnight on 12/28 with rate ~ 140s  # LV thrombus  # HTN  - discontinued ASA 81 QD   - On plavix 75mg qd, Lovenox 100mg q12  - cw losartan 25 mg daily  - cw Metoprolol 12.5mg PO Q6  - s/p lidocaine  and s/p amio drip for vtach , now on PO , c/w amiodarone 400 mg BID PO. today day 13  - Viability study showed nonviable myocardium  - EP following for VT  - Procal 0.22   - MRSA swab > Negative   - Pulm consulted; minimal pleural effusion ;  On IV steroids ARDS dosing   - HF team following . approaching euvolemia, now on PO torsemide 40 mg QD ( Net negative UO about 3 L )   - ID consult appreciated  - : steroids started ARDS dose ; Decadron 20 mg IV>>changed to 10 mg ( Hyperglycemia)   - Monitor intake and output   - BMP BID   - Keep K > 4.0 and Mag > 2.0.   - cath done 1/10 > severe 2 VD in the LAD and OM1 with no intervention, with plans for staged intervention to the LAD when his pulmonary status improves, also his filling pressures were low and was hypotensive, given fluids and his metoprolol and torsemide weree held    #HLD  #IDDM w/ hyperglycemia  -TSH 0.99  -A1c 10.8 -   -  -Hyperglycemic worsening due to steroids.   -upgrade to ccu for insulin drip as above, keep NPO , check VBG, BMP and BHB      DVT PPX: Lovenox    MEDICATIONS:  acetaminophen     Tablet .. 650 milliGRAM(s) Oral every 6 hours PRN  albuterol/ipratropium for Nebulization 3 milliLiter(s) Nebulizer every 6 hours PRN  aMIOdarone    Tablet 400 milliGRAM(s) Oral two times a day  benzocaine/menthol Lozenge 1 Lozenge Oral every 3 hours PRN  benzonatate 100 milliGRAM(s) Oral every 8 hours PRN  cefepime   IVPB      cefepime   IVPB 2000 milliGRAM(s) IV Intermittent every 8 hours  cetirizine 10 milliGRAM(s) Oral daily  chlorhexidine 2% Cloths 1 Application(s) Topical daily  clopidogrel Tablet 75 milliGRAM(s) Oral daily  dexAMETHasone  IVPB 10 milliGRAM(s) IV Intermittent daily  dextrose 50% Injectable 50 milliLiter(s) IV Push every 15 minutes  doxycycline IVPB      doxycycline IVPB 100 milliGRAM(s) IV Intermittent every 12 hours  enoxaparin Injectable 100 milliGRAM(s) SubCutaneous every 12 hours  glucagon  Injectable 1 milliGRAM(s) IntraMuscular once  guaifenesin/dextromethorphan Oral Liquid 10 milliLiter(s) Oral every 4 hours PRN  insulin glargine Injectable (LANTUS) 40 Unit(s) SubCutaneous at bedtime  insulin lispro (ADMELOG) corrective regimen sliding scale   SubCutaneous three times a day before meals  insulin lispro Injectable (ADMELOG) 24 Unit(s) SubCutaneous three times a day before meals  insulin regular Infusion 9 Unit(s)/Hr IV Continuous <Continuous>  losartan 25 milliGRAM(s) Oral daily  melatonin 5 milliGRAM(s) Oral at bedtime  nicotine -  14 mG/24Hr(s) Patch 1 Patch Transdermal every 24 hours  pantoprazole  Injectable 40 milliGRAM(s) IV Push with breakfast  regadenoson Injectable 0.4 milliGRAM(s) IV Push once  sodium chloride 0.9%. 1000 milliLiter(s) IV Continuous <Continuous>      T(C): 36.6 (01-10-25 @ 08:09), Max: 36.8 (01-10-25 @ 07:19)  HR: 68 (01-10-25 @ 13:10) (62 - 80)  BP: 109/67 (01-10-25 @ 13:10) (102/64 - 140/80)  RR: 22 (01-10-25 @ 13:10) (12 - 24)  SpO2: 92% (01-10-25 @ 13:10) (91% - 95%)  Wt(kg): --Vital Signs Last 24 Hrs  T(C): 36.6 (10 Minesh 2025 08:09), Max: 36.8 (10 Minesh 2025 07:19)  T(F): 97.8 (10 Minesh 2025 08:09), Max: 98.2 (10 Minesh 2025 07:19)  HR: 68 (10 Minesh 2025 13:10) (62 - 80)  BP: 109/67 (10 Minesh 2025 13:10) (102/64 - 140/80)  BP(mean): 75 (10 Minesh 2025 07:19) (75 - 102)  RR: 22 (10 Minesh 2025 13:10) (12 - 24)  SpO2: 92% (10 Minesh 2025 13:10) (91% - 95%)    Parameters below as of 10 Minesh 2025 10:20  Patient On (Oxygen Delivery Method): nasal cannula  O2 Flow (L/min): 5        Consultant(s) Notes Reviewed:  [x ] YES  [ ] NO  Care Discussed with Consultants/Other Providers [ x] YES  [ ] NO    LABS:                        12.3   23.47 )-----------( 472      ( 10 Minesh 2025 05:25 )             37.3     01-10    134[L]  |  98  |  48[H]  ----------------------------<  308[H]  4.2   |  19  |  1.1    Ca    8.3[L]      10 Minesh 2025 05:25  Mg     2.3     01-10    TPro  6.7  /  Alb  3.4[L]  /  TBili  0.2  /  DBili  x   /  AST  44[H]  /  ALT  60[H]  /  AlkPhos  211[H]  01-10      Urinalysis Basic - ( 10 Minesh 2025 05:25 )    Color: x / Appearance: x / SG: x / pH: x  Gluc: 308 mg/dL / Ketone: x  / Bili: x / Urobili: x   Blood: x / Protein: x / Nitrite: x   Leuk Esterase: x / RBC: x / WBC x   Sq Epi: x / Non Sq Epi: x / Bacteria: x      CAPILLARY BLOOD GLUCOSE      POCT Blood Glucose.: 588 mg/dL (10 Minesh 2025 13:46)  POCT Blood Glucose.: >600 mg/dL (10 Minesh 2025 13:43)  POCT Blood Glucose.: >600 mg/dL (10 Minesh 2025 12:50)  POCT Blood Glucose.: 594 mg/dL (10 Minesh 2025 12:43)  POCT Blood Glucose.: 336 mg/dL (10 Minesh 2025 07:20)  POCT Blood Glucose.: 327 mg/dL (10 Minesh 2025 03:21)  POCT Blood Glucose.: 344 mg/dL (10 Minesh 2025 02:22)  POCT Blood Glucose.: 357 mg/dL (10 Minesh 2025 01:00)  POCT Blood Glucose.: 364 mg/dL (10 Minesh 2025 00:01)  POCT Blood Glucose.: 343 mg/dL (09 Jan 2025 21:09)  POCT Blood Glucose.: 314 mg/dL (09 Jan 2025 17:38)  POCT Blood Glucose.: 303 mg/dL (09 Jan 2025 16:06)        Urinalysis Basic - ( 10 Minesh 2025 05:25 )    Color: x / Appearance: x / SG: x / pH: x  Gluc: 308 mg/dL / Ketone: x  / Bili: x / Urobili: x   Blood: x / Protein: x / Nitrite: x   Leuk Esterase: x / RBC: x / WBC x   Sq Epi: x / Non Sq Epi: x / Bacteria: x        RADIOLOGY & ADDITIONAL TESTS:    Imaging Personally Reviewed:  [x ] YES  [ ] NO

## 2025-01-10 NOTE — PROGRESS NOTE ADULT - SUBJECTIVE AND OBJECTIVE BOX
Interventional Cardiology/Advanced Heart Failure Transplant Attending Progress Note    SARAH FARMER  MRN-[id number]    Date of Admission: 12-26-24    24 hr events: LHC/RHC today    acetaminophen     Tablet .. 650 milliGRAM(s) Oral every 6 hours PRN  albuterol/ipratropium for Nebulization 3 milliLiter(s) Nebulizer every 6 hours PRN  aMIOdarone    Tablet 400 milliGRAM(s) Oral two times a day  benzocaine/menthol Lozenge 1 Lozenge Oral every 3 hours PRN  benzonatate 100 milliGRAM(s) Oral every 8 hours PRN  cefepime   IVPB 2000 milliGRAM(s) IV Intermittent every 8 hours  cefepime   IVPB      cetirizine 10 milliGRAM(s) Oral daily  chlorhexidine 2% Cloths 1 Application(s) Topical daily  clopidogrel Tablet 75 milliGRAM(s) Oral daily  dexAMETHasone  IVPB 10 milliGRAM(s) IV Intermittent daily  dextrose 50% Injectable 50 milliLiter(s) IV Push every 15 minutes  doxycycline IVPB      doxycycline IVPB 100 milliGRAM(s) IV Intermittent every 12 hours  enoxaparin Injectable 100 milliGRAM(s) SubCutaneous every 12 hours  glucagon  Injectable 1 milliGRAM(s) IntraMuscular once  guaifenesin/dextromethorphan Oral Liquid 10 milliLiter(s) Oral every 4 hours PRN  insulin glargine Injectable (LANTUS) 40 Unit(s) SubCutaneous at bedtime  insulin lispro (ADMELOG) corrective regimen sliding scale   SubCutaneous three times a day before meals  insulin lispro Injectable (ADMELOG) 24 Unit(s) SubCutaneous three times a day before meals  insulin regular Infusion 9 Unit(s)/Hr IV Continuous <Continuous>  losartan 25 milliGRAM(s) Oral daily  melatonin 5 milliGRAM(s) Oral at bedtime  nicotine -  14 mG/24Hr(s) Patch 1 Patch Transdermal every 24 hours  pantoprazole  Injectable 40 milliGRAM(s) IV Push with breakfast  regadenoson Injectable 0.4 milliGRAM(s) IV Push once  sodium chloride 0.9%. 1000 milliLiter(s) IV Continuous <Continuous>      PHYSICAL EXAM:  Vitals reviewed    General Appearance: no acute distress  Cardiovascular: regular rate and rhythm S1 S2, No JVD, No murmurs  Respiratory: bibasilar crackles  Gastrointestinal:  Soft, Non-tender  Skin/Integumen: Warm	  Neurologic: Non-focal  Musculoskeletal/ extremities: No edema  Vascular: Peripheral pulses palpable 2+ bilaterally    LABS: Reviewed    CARDIAC TESTING:  LHC/RHC pending    OTHER DIAGNOSTIC TESTING:  CXR: improved inflitrates  CT:

## 2025-01-11 LAB
ALBUMIN SERPL ELPH-MCNC: 3.3 G/DL — LOW (ref 3.5–5.2)
ALP SERPL-CCNC: 162 U/L — HIGH (ref 30–115)
ALT FLD-CCNC: 63 U/L — HIGH (ref 0–41)
ANION GAP SERPL CALC-SCNC: 13 MMOL/L — SIGNIFICANT CHANGE UP (ref 7–14)
AST SERPL-CCNC: 51 U/L — HIGH (ref 0–41)
BASOPHILS # BLD AUTO: 0 K/UL — SIGNIFICANT CHANGE UP (ref 0–0.2)
BASOPHILS NFR BLD AUTO: 0 % — SIGNIFICANT CHANGE UP (ref 0–1)
BILIRUB SERPL-MCNC: 0.3 MG/DL — SIGNIFICANT CHANGE UP (ref 0.2–1.2)
BUN SERPL-MCNC: 46 MG/DL — HIGH (ref 10–20)
CALCIUM SERPL-MCNC: 8.3 MG/DL — LOW (ref 8.4–10.5)
CHLORIDE SERPL-SCNC: 100 MMOL/L — SIGNIFICANT CHANGE UP (ref 98–110)
CO2 SERPL-SCNC: 21 MMOL/L — SIGNIFICANT CHANGE UP (ref 17–32)
CREAT SERPL-MCNC: 1.2 MG/DL — SIGNIFICANT CHANGE UP (ref 0.7–1.5)
CULTURE RESULTS: SIGNIFICANT CHANGE UP
EGFR: 76 ML/MIN/1.73M2 — SIGNIFICANT CHANGE UP
EOSINOPHIL # BLD AUTO: 0 K/UL — SIGNIFICANT CHANGE UP (ref 0–0.7)
EOSINOPHIL NFR BLD AUTO: 0 % — SIGNIFICANT CHANGE UP (ref 0–8)
GAS PNL BLDA: SIGNIFICANT CHANGE UP
GLUCOSE BLDC GLUCOMTR-MCNC: 110 MG/DL — HIGH (ref 70–99)
GLUCOSE BLDC GLUCOMTR-MCNC: 125 MG/DL — HIGH (ref 70–99)
GLUCOSE BLDC GLUCOMTR-MCNC: 160 MG/DL — HIGH (ref 70–99)
GLUCOSE BLDC GLUCOMTR-MCNC: 167 MG/DL — HIGH (ref 70–99)
GLUCOSE BLDC GLUCOMTR-MCNC: 188 MG/DL — HIGH (ref 70–99)
GLUCOSE BLDC GLUCOMTR-MCNC: 193 MG/DL — HIGH (ref 70–99)
GLUCOSE BLDC GLUCOMTR-MCNC: 212 MG/DL — HIGH (ref 70–99)
GLUCOSE BLDC GLUCOMTR-MCNC: 231 MG/DL — HIGH (ref 70–99)
GLUCOSE BLDC GLUCOMTR-MCNC: 250 MG/DL — HIGH (ref 70–99)
GLUCOSE BLDC GLUCOMTR-MCNC: 271 MG/DL — HIGH (ref 70–99)
GLUCOSE BLDC GLUCOMTR-MCNC: 308 MG/DL — HIGH (ref 70–99)
GLUCOSE BLDC GLUCOMTR-MCNC: 313 MG/DL — HIGH (ref 70–99)
GLUCOSE SERPL-MCNC: 114 MG/DL — HIGH (ref 70–99)
HCT VFR BLD CALC: 35.7 % — LOW (ref 42–52)
HGB BLD-MCNC: 12 G/DL — LOW (ref 14–18)
LACTATE SERPL-SCNC: 1.4 MMOL/L — SIGNIFICANT CHANGE UP (ref 0.7–2)
LACTATE SERPL-SCNC: 4.5 MMOL/L — CRITICAL HIGH (ref 0.7–2)
LYMPHOCYTES # BLD AUTO: 15 % — LOW (ref 20.5–51.1)
LYMPHOCYTES # BLD AUTO: 2.46 K/UL — SIGNIFICANT CHANGE UP (ref 1.2–3.4)
MAGNESIUM SERPL-MCNC: 2 MG/DL — SIGNIFICANT CHANGE UP (ref 1.8–2.4)
MCHC RBC-ENTMCNC: 31 PG — SIGNIFICANT CHANGE UP (ref 27–31)
MCHC RBC-ENTMCNC: 33.6 G/DL — SIGNIFICANT CHANGE UP (ref 32–37)
MCV RBC AUTO: 92.2 FL — SIGNIFICANT CHANGE UP (ref 80–94)
MONOCYTES # BLD AUTO: 0.43 K/UL — SIGNIFICANT CHANGE UP (ref 0.1–0.6)
MONOCYTES NFR BLD AUTO: 2.6 % — SIGNIFICANT CHANGE UP (ref 1.7–9.3)
NEUTROPHILS # BLD AUTO: 12.9 K/UL — HIGH (ref 1.4–6.5)
NEUTROPHILS NFR BLD AUTO: 77.9 % — HIGH (ref 42.2–75.2)
PLATELET # BLD AUTO: 395 K/UL — SIGNIFICANT CHANGE UP (ref 130–400)
PMV BLD: 9.3 FL — SIGNIFICANT CHANGE UP (ref 7.4–10.4)
POTASSIUM SERPL-MCNC: 4.2 MMOL/L — SIGNIFICANT CHANGE UP (ref 3.5–5)
POTASSIUM SERPL-SCNC: 4.2 MMOL/L — SIGNIFICANT CHANGE UP (ref 3.5–5)
PROT SERPL-MCNC: 6.3 G/DL — SIGNIFICANT CHANGE UP (ref 6–8)
RBC # BLD: 3.87 M/UL — LOW (ref 4.7–6.1)
RBC # FLD: 12.8 % — SIGNIFICANT CHANGE UP (ref 11.5–14.5)
SODIUM SERPL-SCNC: 134 MMOL/L — LOW (ref 135–146)
SPECIMEN SOURCE: SIGNIFICANT CHANGE UP
WBC # BLD: 16.37 K/UL — HIGH (ref 4.8–10.8)
WBC # FLD AUTO: 16.37 K/UL — HIGH (ref 4.8–10.8)

## 2025-01-11 PROCEDURE — 99233 SBSQ HOSP IP/OBS HIGH 50: CPT

## 2025-01-11 PROCEDURE — 93010 ELECTROCARDIOGRAM REPORT: CPT

## 2025-01-11 RX ORDER — SODIUM CHLORIDE 9 MG/ML
500 INJECTION, SOLUTION INTRAVENOUS ONCE
Refills: 0 | Status: COMPLETED | OUTPATIENT
Start: 2025-01-11 | End: 2025-01-11

## 2025-01-11 RX ORDER — LOSARTAN POTASSIUM 100 MG/1
50 TABLET, FILM COATED ORAL DAILY
Refills: 0 | Status: DISCONTINUED | OUTPATIENT
Start: 2025-01-11 | End: 2025-01-12

## 2025-01-11 RX ORDER — AMIODARONE HYDROCHLORIDE 200 MG/1
200 TABLET ORAL DAILY
Refills: 0 | Status: DISCONTINUED | OUTPATIENT
Start: 2025-01-12 | End: 2025-01-16

## 2025-01-11 RX ORDER — ASPIRIN 81 MG
81 TABLET, DELAYED RELEASE (ENTERIC COATED) ORAL DAILY
Refills: 0 | Status: DISCONTINUED | OUTPATIENT
Start: 2025-01-11 | End: 2025-01-16

## 2025-01-11 RX ORDER — INSULIN HUMAN 100 [IU]/ML
5 INJECTION, SOLUTION SUBCUTANEOUS ONCE
Refills: 0 | Status: COMPLETED | OUTPATIENT
Start: 2025-01-11 | End: 2025-01-11

## 2025-01-11 RX ORDER — INSULIN GLARGINE-YFGN 100 [IU]/ML
20 INJECTION, SOLUTION SUBCUTANEOUS ONCE
Refills: 0 | Status: COMPLETED | OUTPATIENT
Start: 2025-01-11 | End: 2025-01-11

## 2025-01-11 RX ORDER — INSULIN GLARGINE-YFGN 100 [IU]/ML
60 INJECTION, SOLUTION SUBCUTANEOUS AT BEDTIME
Refills: 0 | Status: DISCONTINUED | OUTPATIENT
Start: 2025-01-11 | End: 2025-01-12

## 2025-01-11 RX ADMIN — INSULIN HUMAN 5 UNIT(S): 100 INJECTION, SOLUTION SUBCUTANEOUS at 13:06

## 2025-01-11 RX ADMIN — Medication 6: at 17:09

## 2025-01-11 RX ADMIN — Medication 100 MILLIGRAM(S): at 13:51

## 2025-01-11 RX ADMIN — Medication 102 MILLIGRAM(S): at 05:03

## 2025-01-11 RX ADMIN — CETIRIZINE HYDROCHLORIDE 10 MILLIGRAM(S): 5 SYRUP ORAL at 11:39

## 2025-01-11 RX ADMIN — Medication 24 UNIT(S): at 08:08

## 2025-01-11 RX ADMIN — Medication 100 MILLIGRAM(S): at 17:10

## 2025-01-11 RX ADMIN — ACETAMINOPHEN 650 MILLIGRAM(S): 80 SOLUTION/ DROPS ORAL at 08:39

## 2025-01-11 RX ADMIN — CHLORHEXIDINE GLUCONATE 1 APPLICATION(S): 1.2 RINSE ORAL at 05:04

## 2025-01-11 RX ADMIN — PANTOPRAZOLE 40 MILLIGRAM(S): 40 TABLET, DELAYED RELEASE ORAL at 08:08

## 2025-01-11 RX ADMIN — Medication 81 MILLIGRAM(S): at 11:42

## 2025-01-11 RX ADMIN — Medication 100 MILLIGRAM(S): at 05:02

## 2025-01-11 RX ADMIN — ACETAMINOPHEN 650 MILLIGRAM(S): 80 SOLUTION/ DROPS ORAL at 17:40

## 2025-01-11 RX ADMIN — LOSARTAN POTASSIUM 50 MILLIGRAM(S): 100 TABLET, FILM COATED ORAL at 11:42

## 2025-01-11 RX ADMIN — CLOPIDOGREL BISULFATE 75 MILLIGRAM(S): 75 TABLET, FILM COATED ORAL at 11:39

## 2025-01-11 RX ADMIN — Medication 24 UNIT(S): at 11:39

## 2025-01-11 RX ADMIN — Medication 4: at 08:08

## 2025-01-11 RX ADMIN — INSULIN GLARGINE-YFGN 20 UNIT(S): 100 INJECTION, SOLUTION SUBCUTANEOUS at 09:45

## 2025-01-11 RX ADMIN — Medication 5 MILLIGRAM(S): at 21:40

## 2025-01-11 RX ADMIN — ENOXAPARIN SODIUM 100 MILLIGRAM(S): 60 INJECTION INTRAVENOUS; SUBCUTANEOUS at 05:02

## 2025-01-11 RX ADMIN — ACETAMINOPHEN 650 MILLIGRAM(S): 80 SOLUTION/ DROPS ORAL at 17:09

## 2025-01-11 RX ADMIN — Medication 24 UNIT(S): at 17:10

## 2025-01-11 RX ADMIN — SODIUM CHLORIDE 1000 MILLILITER(S): 9 INJECTION, SOLUTION INTRAVENOUS at 01:20

## 2025-01-11 RX ADMIN — ENOXAPARIN SODIUM 100 MILLIGRAM(S): 60 INJECTION INTRAVENOUS; SUBCUTANEOUS at 17:09

## 2025-01-11 RX ADMIN — NICOTINE POLACRILEX 1 PATCH: 4 LOZENGE ORAL at 05:12

## 2025-01-11 RX ADMIN — INSULIN GLARGINE-YFGN 60 UNIT(S): 100 INJECTION, SOLUTION SUBCUTANEOUS at 21:40

## 2025-01-11 RX ADMIN — LOSARTAN POTASSIUM 25 MILLIGRAM(S): 100 TABLET, FILM COATED ORAL at 05:02

## 2025-01-11 RX ADMIN — AMIODARONE HYDROCHLORIDE 400 MILLIGRAM(S): 200 TABLET ORAL at 05:02

## 2025-01-11 RX ADMIN — Medication 100 MILLIGRAM(S): at 21:40

## 2025-01-11 RX ADMIN — NICOTINE POLACRILEX 1 PATCH: 4 LOZENGE ORAL at 07:16

## 2025-01-11 RX ADMIN — Medication 8: at 11:38

## 2025-01-11 RX ADMIN — ACETAMINOPHEN 650 MILLIGRAM(S): 80 SOLUTION/ DROPS ORAL at 08:09

## 2025-01-11 NOTE — PROGRESS NOTE ADULT - ASSESSMENT
43 YO M with PMHx of HTN, IDDM, HLD, active smoker (1/2 PPD x 28 yrs), hepatitis B (s/p treatment) admitted with delayed presentation anterolateral MI , acute systolic heart failure, course c/b VT s/p DCCV, COVID-19 pneumonia and acute hypoxic respiratory failure.    # Delayed presentation MI/2-vessel CAD with severe disease in mLAD and D1 and OM1   # Acute HFrEF, ICM, EF 25-30%, with low CO/CI on 1/10, Ficks 3.49/1.67  # Sustained monomorphic slow Vtach; 12 min overnight on 12/28 with rate ~ 140s  # LV thrombus  # HTN  #COVID-19 disease and acute hypoxic resp failure   #DM with uncontrolled hyperglycemia requiring insulin gtt       Plan-   -resume aspirin 81 mg , cont plavix 75 mg and Lovenox 100 mg q12h, ator  -increase losartan to 50 mg daily  - cont to hold Metoprolol   -stop IVF , no standing diuretics  -possible revascularization next week   -resume statin once LFTs improve  -amiodarone load completed, switch to amiodarone 200 mg daily  - EP f/u for VT after PCI  - Keep K > 4.0 and Mag > 2.0.   -cont IV dexamethasone 10 mg for 5 days-D3  -cont GI ppx with IV PPI  -cont IV cefepime and IV doxycycline -D6 per ID, cont over weekend for now   -insulin Lantus 60 U tonight with Lispro 24 U TID for goal FSG < 200       DVT PPX: Lovenox  Diet: DASH CC with fluid restriction  DISPO: Cardiac SDU   45 YO M with PMHx of HTN, IDDM, HLD, active smoker (1/2 PPD x 28 yrs), hepatitis B (s/p treatment) admitted with delayed presentation anterolateral MI , acute systolic heart failure, course c/b VT s/p DCCV, COVID-19 pneumonia and acute hypoxic respiratory failure.    # Delayed presentation MI/2-vessel CAD with severe disease in mLAD and D1 and OM1   # Acute HFrEF, ICM, EF 25-30%, with low CO/CI on 1/10, Ficks 3.49/1.67  # Sustained monomorphic slow Vtach; 12 min overnight on 12/28 with rate ~ 140s  # LV thrombus  # HTN  #COVID-19 disease and acute hypoxic resp failure   #DM with uncontrolled hyperglycemia requiring insulin gtt       Plan-   -resume aspirin 81 mg , cont plavix 75 mg and Lovenox 100 mg q12h  -increase losartan to 50 mg daily   cont to hold Metoprolol   -stop IVF , no standing diuretics  -possible revascularization next week   -start atorvastatin once LFTs improve  -amiodarone load completed, switch to amiodarone 200 mg daily  - EP f/u for VT after PCI  - Keep K > 4.0 and Mag > 2.0.   -cont IV dexamethasone 10 mg for 5 days-D3  -cont GI ppx with IV PPI  -cont IV cefepime and IV doxycycline -D6 per ID, cont over weekend for now   -insulin Lantus 60 U tonight with Lispro 24 U TID for goal FSG < 200       DVT PPX: Lovenox  Diet: DASH CC with fluid restriction  DISPO: Cardiac SDU

## 2025-01-11 NOTE — PROGRESS NOTE ADULT - SUBJECTIVE AND OBJECTIVE BOX
CHIEF COMPLAINT:  Patient is a 44y old  Male who presents with a chief complaint of MI (04 Jan 2025 12:40)      INTERVAL HISTORY/OVERNIGHT EVENTS:  -insulin gtt transitioned to basal bolus regimen  -pt feels well, no SOB/CP   -IVC this AM 2.0 cm and collapsing > 50%     ======================  MEDICATIONS:  aMIOdarone    Tablet 400 milliGRAM(s) Oral two times a day  aspirin enteric coated 81 milliGRAM(s) Oral daily  cefepime   IVPB      cefepime   IVPB 2000 milliGRAM(s) IV Intermittent every 8 hours  cetirizine 10 milliGRAM(s) Oral daily  chlorhexidine 2% Cloths 1 Application(s) Topical daily  clopidogrel Tablet 75 milliGRAM(s) Oral daily  dexAMETHasone  IVPB 10 milliGRAM(s) IV Intermittent daily  dextrose 50% Injectable 50 milliLiter(s) IV Push every 15 minutes  doxycycline IVPB      doxycycline IVPB 100 milliGRAM(s) IV Intermittent every 12 hours  enoxaparin Injectable 100 milliGRAM(s) SubCutaneous every 12 hours  glucagon  Injectable 1 milliGRAM(s) IntraMuscular once  insulin glargine Injectable (LANTUS) 60 Unit(s) SubCutaneous at bedtime  insulin lispro (ADMELOG) corrective regimen sliding scale   SubCutaneous three times a day before meals  insulin lispro Injectable (ADMELOG) 24 Unit(s) SubCutaneous three times a day before meals  losartan 25 milliGRAM(s) Oral daily  losartan 50 milliGRAM(s) Oral daily  melatonin 5 milliGRAM(s) Oral at bedtime  nicotine -  14 mG/24Hr(s) Patch 1 Patch Transdermal every 24 hours  pantoprazole  Injectable 40 milliGRAM(s) IV Push with breakfast  regadenoson Injectable 0.4 milliGRAM(s) IV Push once    DRIPS:    PRN:       ======================  PHYSICAL EXAMINATION:  GEN:  nad.   HEENT:  eomi. ncat  PULM:  b/l lung sounds   CARD: s1, s2  ABD: +bs. ntnd  EXT:  no new rashes.    NEURO:  no new focal deficits.   ======================  OBJECTIVE:        VS:  T(F): 98.7 (01-11 @ 14:45), Max: 98.7 (01-11 @ 14:45)  HR: 71 (01-11 @ 14:45) (64 - 83)  BP: 121/57 (01-11 @ 14:45) (68/- - 142/65)  RR: 23 (01-11 @ 14:45) (5 - 30)  SpO2: 94% (01-11 @ 14:45) (91% - 98%)  CVP(mm Hg): --  CO: --  CI: --  PA: --  PCWP: --    I/O:      01-08 @ 07:01  -  01-09 @ 07:00  --------------------------------------------------------  IN: 486 mL / OUT: 1225 mL / NET: -739 mL    01-09 @ 07:01  -  01-10 @ 07:00  --------------------------------------------------------  IN: 936 mL / OUT: 4000 mL / NET: -3064 mL    01-10 @ 07:01  -  01-11 @ 07:00  --------------------------------------------------------  IN: 3165 mL / OUT: 2975 mL / NET: 190 mL    01-11 @ 07:01  -  01-11 @ 16:36  --------------------------------------------------------  IN: 850 mL / OUT: 725 mL / NET: 125 mL        Weight trend:  Weight (kg): 96 (01-10)    ======================    LABS:  ABG - ( 11 Jan 2025 00:30 )  pH, Arterial: 7.49  pH, Blood: x     /  pCO2: 28    /  pO2: 81    / HCO3: 21    / Base Excess: -0.9  /  SaO2: 97.5                                    12.0   16.37 )-----------( 395      ( 11 Jan 2025 04:44 )             35.7     01-11    134[L]  |  100  |  46[H]  ----------------------------<  114[H]  4.2   |  21  |  1.2    Ca    8.3[L]      11 Jan 2025 04:44  Phos  4.0     01-10  Mg     2.0     01-11    TPro  6.3  /  Alb  3.3[L]  /  TBili  0.3  /  DBili  x   /  AST  51[H]  /  ALT  63[H]  /  AlkPhos  162[H]  01-11    LIVER FUNCTIONS - ( 11 Jan 2025 04:44 )  Alb: 3.3 g/dL / Pro: 6.3 g/dL / ALK PHOS: 162 U/L / ALT: 63 U/L / AST: 51 U/L / GGT: x                     Urinalysis Basic - ( 11 Jan 2025 04:44 )    Color: x / Appearance: x / SG: x / pH: x  Gluc: 114 mg/dL / Ketone: x  / Bili: x / Urobili: x   Blood: x / Protein: x / Nitrite: x   Leuk Esterase: x / RBC: x / WBC x   Sq Epi: x / Non Sq Epi: x / Bacteria: x        Cultures:

## 2025-01-12 LAB
ALBUMIN SERPL ELPH-MCNC: 3.9 G/DL — SIGNIFICANT CHANGE UP (ref 3.5–5.2)
ALP SERPL-CCNC: 186 U/L — HIGH (ref 30–115)
ALT FLD-CCNC: 104 U/L — HIGH (ref 0–41)
ANION GAP SERPL CALC-SCNC: 15 MMOL/L — HIGH (ref 7–14)
AST SERPL-CCNC: 77 U/L — HIGH (ref 0–41)
BASOPHILS # BLD AUTO: 0.15 K/UL — SIGNIFICANT CHANGE UP (ref 0–0.2)
BASOPHILS NFR BLD AUTO: 0.8 % — SIGNIFICANT CHANGE UP (ref 0–1)
BILIRUB SERPL-MCNC: 0.3 MG/DL — SIGNIFICANT CHANGE UP (ref 0.2–1.2)
BUN SERPL-MCNC: 41 MG/DL — HIGH (ref 10–20)
CALCIUM SERPL-MCNC: 8.9 MG/DL — SIGNIFICANT CHANGE UP (ref 8.4–10.5)
CHLORIDE SERPL-SCNC: 98 MMOL/L — SIGNIFICANT CHANGE UP (ref 98–110)
CO2 SERPL-SCNC: 18 MMOL/L — SIGNIFICANT CHANGE UP (ref 17–32)
CREAT SERPL-MCNC: 0.9 MG/DL — SIGNIFICANT CHANGE UP (ref 0.7–1.5)
EGFR: 108 ML/MIN/1.73M2 — SIGNIFICANT CHANGE UP
EOSINOPHIL # BLD AUTO: 0.16 K/UL — SIGNIFICANT CHANGE UP (ref 0–0.7)
EOSINOPHIL NFR BLD AUTO: 0.9 % — SIGNIFICANT CHANGE UP (ref 0–8)
GLUCOSE BLDC GLUCOMTR-MCNC: 219 MG/DL — HIGH (ref 70–99)
GLUCOSE BLDC GLUCOMTR-MCNC: 231 MG/DL — HIGH (ref 70–99)
GLUCOSE BLDC GLUCOMTR-MCNC: 233 MG/DL — HIGH (ref 70–99)
GLUCOSE BLDC GLUCOMTR-MCNC: 279 MG/DL — HIGH (ref 70–99)
GLUCOSE SERPL-MCNC: 239 MG/DL — HIGH (ref 70–99)
HCT VFR BLD CALC: 38.4 % — LOW (ref 42–52)
HGB BLD-MCNC: 12.7 G/DL — LOW (ref 14–18)
IMM GRANULOCYTES NFR BLD AUTO: 7.6 % — HIGH (ref 0.1–0.3)
LYMPHOCYTES # BLD AUTO: 12.3 % — LOW (ref 20.5–51.1)
LYMPHOCYTES # BLD AUTO: 2.3 K/UL — SIGNIFICANT CHANGE UP (ref 1.2–3.4)
MAGNESIUM SERPL-MCNC: 2 MG/DL — SIGNIFICANT CHANGE UP (ref 1.8–2.4)
MCHC RBC-ENTMCNC: 30.5 PG — SIGNIFICANT CHANGE UP (ref 27–31)
MCHC RBC-ENTMCNC: 33.1 G/DL — SIGNIFICANT CHANGE UP (ref 32–37)
MCV RBC AUTO: 92.3 FL — SIGNIFICANT CHANGE UP (ref 80–94)
MONOCYTES # BLD AUTO: 1.12 K/UL — HIGH (ref 0.1–0.6)
MONOCYTES NFR BLD AUTO: 6 % — SIGNIFICANT CHANGE UP (ref 1.7–9.3)
NEUTROPHILS # BLD AUTO: 13.52 K/UL — HIGH (ref 1.4–6.5)
NEUTROPHILS NFR BLD AUTO: 72.4 % — SIGNIFICANT CHANGE UP (ref 42.2–75.2)
NRBC # BLD: 0 /100 WBCS — SIGNIFICANT CHANGE UP (ref 0–0)
PLATELET # BLD AUTO: 505 K/UL — HIGH (ref 130–400)
PMV BLD: 9.5 FL — SIGNIFICANT CHANGE UP (ref 7.4–10.4)
POTASSIUM SERPL-MCNC: 5 MMOL/L — SIGNIFICANT CHANGE UP (ref 3.5–5)
POTASSIUM SERPL-SCNC: 5 MMOL/L — SIGNIFICANT CHANGE UP (ref 3.5–5)
PROT SERPL-MCNC: 7.1 G/DL — SIGNIFICANT CHANGE UP (ref 6–8)
RBC # BLD: 4.16 M/UL — LOW (ref 4.7–6.1)
RBC # FLD: 12.8 % — SIGNIFICANT CHANGE UP (ref 11.5–14.5)
SODIUM SERPL-SCNC: 131 MMOL/L — LOW (ref 135–146)
WBC # BLD: 18.66 K/UL — HIGH (ref 4.8–10.8)
WBC # FLD AUTO: 18.66 K/UL — HIGH (ref 4.8–10.8)

## 2025-01-12 PROCEDURE — 99233 SBSQ HOSP IP/OBS HIGH 50: CPT

## 2025-01-12 PROCEDURE — 71045 X-RAY EXAM CHEST 1 VIEW: CPT | Mod: 26

## 2025-01-12 PROCEDURE — 93010 ELECTROCARDIOGRAM REPORT: CPT

## 2025-01-12 RX ORDER — INSULIN GLARGINE-YFGN 100 [IU]/ML
30 INJECTION, SOLUTION SUBCUTANEOUS AT BEDTIME
Refills: 0 | Status: DISCONTINUED | OUTPATIENT
Start: 2025-01-12 | End: 2025-01-16

## 2025-01-12 RX ORDER — LOSARTAN POTASSIUM 100 MG/1
100 TABLET, FILM COATED ORAL DAILY
Refills: 0 | Status: DISCONTINUED | OUTPATIENT
Start: 2025-01-12 | End: 2025-01-16

## 2025-01-12 RX ORDER — INSULIN LISPRO 100/ML
10 VIAL (ML) SUBCUTANEOUS
Refills: 0 | Status: DISCONTINUED | OUTPATIENT
Start: 2025-01-12 | End: 2025-01-16

## 2025-01-12 RX ORDER — INSULIN LISPRO 100/ML
30 VIAL (ML) SUBCUTANEOUS
Refills: 0 | Status: DISCONTINUED | OUTPATIENT
Start: 2025-01-12 | End: 2025-01-12

## 2025-01-12 RX ADMIN — Medication 100 MILLIGRAM(S): at 21:36

## 2025-01-12 RX ADMIN — CHLORHEXIDINE GLUCONATE 1 APPLICATION(S): 1.2 RINSE ORAL at 05:25

## 2025-01-12 RX ADMIN — Medication 100 MILLIGRAM(S): at 13:34

## 2025-01-12 RX ADMIN — PANTOPRAZOLE 40 MILLIGRAM(S): 40 TABLET, DELAYED RELEASE ORAL at 08:03

## 2025-01-12 RX ADMIN — INSULIN GLARGINE-YFGN 30 UNIT(S): 100 INJECTION, SOLUTION SUBCUTANEOUS at 21:36

## 2025-01-12 RX ADMIN — NICOTINE POLACRILEX 1 PATCH: 4 LOZENGE ORAL at 21:03

## 2025-01-12 RX ADMIN — ACETAMINOPHEN 650 MILLIGRAM(S): 80 SOLUTION/ DROPS ORAL at 08:30

## 2025-01-12 RX ADMIN — Medication 6: at 11:32

## 2025-01-12 RX ADMIN — Medication 5 MILLIGRAM(S): at 21:38

## 2025-01-12 RX ADMIN — CETIRIZINE HYDROCHLORIDE 10 MILLIGRAM(S): 5 SYRUP ORAL at 11:33

## 2025-01-12 RX ADMIN — ENOXAPARIN SODIUM 100 MILLIGRAM(S): 60 INJECTION INTRAVENOUS; SUBCUTANEOUS at 17:27

## 2025-01-12 RX ADMIN — Medication 10 UNIT(S): at 17:27

## 2025-01-12 RX ADMIN — NICOTINE POLACRILEX 1 PATCH: 4 LOZENGE ORAL at 05:25

## 2025-01-12 RX ADMIN — LOSARTAN POTASSIUM 50 MILLIGRAM(S): 100 TABLET, FILM COATED ORAL at 05:25

## 2025-01-12 RX ADMIN — Medication 24 UNIT(S): at 09:00

## 2025-01-12 RX ADMIN — Medication 102 MILLIGRAM(S): at 05:24

## 2025-01-12 RX ADMIN — ENOXAPARIN SODIUM 100 MILLIGRAM(S): 60 INJECTION INTRAVENOUS; SUBCUTANEOUS at 05:25

## 2025-01-12 RX ADMIN — CLOPIDOGREL BISULFATE 75 MILLIGRAM(S): 75 TABLET, FILM COATED ORAL at 11:32

## 2025-01-12 RX ADMIN — NICOTINE POLACRILEX 1 PATCH: 4 LOZENGE ORAL at 05:30

## 2025-01-12 RX ADMIN — ACETAMINOPHEN 650 MILLIGRAM(S): 80 SOLUTION/ DROPS ORAL at 08:03

## 2025-01-12 RX ADMIN — Medication 100 MILLIGRAM(S): at 17:26

## 2025-01-12 RX ADMIN — Medication 4: at 17:26

## 2025-01-12 RX ADMIN — Medication 30 UNIT(S): at 11:33

## 2025-01-12 RX ADMIN — Medication 81 MILLIGRAM(S): at 11:32

## 2025-01-12 RX ADMIN — NICOTINE POLACRILEX 1 PATCH: 4 LOZENGE ORAL at 09:01

## 2025-01-12 RX ADMIN — Medication 100 MILLIGRAM(S): at 05:52

## 2025-01-12 RX ADMIN — Medication 4: at 08:03

## 2025-01-12 RX ADMIN — Medication 100 MILLIGRAM(S): at 05:22

## 2025-01-12 RX ADMIN — AMIODARONE HYDROCHLORIDE 200 MILLIGRAM(S): 200 TABLET ORAL at 05:25

## 2025-01-12 RX ADMIN — BENZOCAINE AND MENTHOL 1 LOZENGE: 15; 3.6 LOZENGE ORAL at 11:33

## 2025-01-12 NOTE — PROGRESS NOTE ADULT - SUBJECTIVE AND OBJECTIVE BOX
SUBJECTIVE/OVERNIGHT EVENTS  Today is hospital day 17d. This morning patient was seen and examined at bedside, resting comfortably in bed. No acute or major events overnight.    MEDICATIONS  STANDING MEDICATIONS  aMIOdarone    Tablet 200 milliGRAM(s) Oral daily  aspirin enteric coated 81 milliGRAM(s) Oral daily  cefepime   IVPB      cefepime   IVPB 2000 milliGRAM(s) IV Intermittent every 8 hours  cetirizine 10 milliGRAM(s) Oral daily  chlorhexidine 2% Cloths 1 Application(s) Topical daily  clopidogrel Tablet 75 milliGRAM(s) Oral daily  dextrose 50% Injectable 50 milliLiter(s) IV Push every 15 minutes  doxycycline IVPB      doxycycline IVPB 100 milliGRAM(s) IV Intermittent every 12 hours  enoxaparin Injectable 100 milliGRAM(s) SubCutaneous every 12 hours  glucagon  Injectable 1 milliGRAM(s) IntraMuscular once  insulin glargine Injectable (LANTUS) 30 Unit(s) SubCutaneous at bedtime  insulin lispro (ADMELOG) corrective regimen sliding scale   SubCutaneous three times a day before meals  insulin lispro Injectable (ADMELOG) 10 Unit(s) SubCutaneous three times a day before meals  losartan 50 milliGRAM(s) Oral daily  melatonin 5 milliGRAM(s) Oral at bedtime  nicotine -  14 mG/24Hr(s) Patch 1 Patch Transdermal every 24 hours  pantoprazole  Injectable 40 milliGRAM(s) IV Push with breakfast  regadenoson Injectable 0.4 milliGRAM(s) IV Push once    PRN MEDICATIONS  acetaminophen     Tablet .. 650 milliGRAM(s) Oral every 6 hours PRN  albuterol/ipratropium for Nebulization 3 milliLiter(s) Nebulizer every 6 hours PRN  benzocaine/menthol Lozenge 1 Lozenge Oral every 3 hours PRN  benzonatate 100 milliGRAM(s) Oral every 8 hours PRN  guaifenesin/dextromethorphan Oral Liquid 10 milliLiter(s) Oral every 4 hours PRN    VITALS  T(F): 98 (01-12-25 @ 07:55), Max: 98.7 (01-11-25 @ 14:45)  HR: 77 (01-12-25 @ 07:55) (71 - 78)  BP: 139/78 (01-12-25 @ 07:55) (113/65 - 145/92)  RR: 18 (01-12-25 @ 07:55) (18 - 29)  SpO2: 96% (01-12-25 @ 07:55) (94% - 96%)  POCT Blood Glucose.: 279 mg/dL (01-12-25 @ 11:02)  POCT Blood Glucose.: 233 mg/dL (01-12-25 @ 07:40)  POCT Blood Glucose.: 250 mg/dL (01-11-25 @ 21:01)  POCT Blood Glucose.: 271 mg/dL (01-11-25 @ 16:22)  POCT Blood Glucose.: 308 mg/dL (01-11-25 @ 12:58)    PHYSICAL EXAM  GENERAL: NAD  HEART: RRR, S1, S2  LUNGS: Good and clear air entry bilaterally  ABDOMEN: Soft, non tender, non distended  EXTREMITIES: No edema, 2+ PP  NERVOUS SYSTEM: AO4, non focal  SKIN: Dry and warm, intact, no rash    LABS             12.7   18.66 )-----------( 505      ( 01-12-25 @ 07:06 )             38.4     131  |  98  |  41  -------------------------<  239   01-12-25 @ 07:06  5.0  |  18  |  0.9    Ca      8.9     01-12-25 @ 07:06  Phos   4.0     01-10-25 @ 23:14  Mg     2.0     01-12-25 @ 07:06    TPro  7.1  /  Alb  3.9  /  TBili  0.3  /  DBili  x   /  AST  77  /  ALT  104  /  AlkPhos  186  /  GGT  x     01-12-25 @ 07:06        Urinalysis Basic - ( 12 Jan 2025 07:06 )    Color: x / Appearance: x / SG: x / pH: x  Gluc: 239 mg/dL / Ketone: x  / Bili: x / Urobili: x   Blood: x / Protein: x / Nitrite: x   Leuk Esterase: x / RBC: x / WBC x   Sq Epi: x / Non Sq Epi: x / Bacteria: x      ABG - ( 11 Jan 2025 00:30 )  pH, Arterial: 7.49  pH, Blood: x     /  pCO2: 28    /  pO2: 81    / HCO3: 21    / Base Excess: -0.9  /  SaO2: 97.5                IMAGING

## 2025-01-12 NOTE — PROGRESS NOTE ADULT - ATTENDING COMMENTS
Clinically better   LFNC   CXR better   Improved with diuresis   IVC assessment daily   Recall as needed
Please see cardiology fellow/cardiology attending note from today
See H&P for attending attestation
44-year-old male past medical history hypertension, hyperlipidemia, diabetes, active smoker presents with dyspnea and chest pain.  Patient with recent COVID infection.  Patient found to have an NSTEMI and echo with severely reduced LV systolic function.  WMA in significant LAD territory.    Given multiple risk factors patient most likely has newly diagnosed ischemic cardiomyopathy. He is now on NC and will schedule for LHC/RHC tomorrow. Respiratory status improving on steroids.    On exam he is warm and well-perfused.  He appears mildly hypervolemic with JVP~8cm. Recommend IV bumex 2mg today for net negative 2L. He is tolerating low-dose beta-blocker and losartan 25 mg daily.  Hyperkalemia improving but BP soft and so will hold off on MRA at this time.    Eduardo Jewell MD  Interventional Cardiology/Advanced Heart Failure Transplant
45 y/o M with h/o HTN, DM, active smoking coming with chest pain since one week ago. He ruled in for NSTEMI. LVEF 25% on TTE. His course has been complicated by COVID on admission now on HFNC and CT chest c/w ARDS. He also had episode of NSVT treated with lidocaine. He has an LV thrombus on A/C.       Trend inflammatory markers, if worsening will consider adding dexamethasone   Continue heparin gtt   Continue amiodarone and low dose BB   Continue losartan   Wean O2 as tolerated   Continue diuresis with Bumex 2 mg iv TID  Coronary angiogram once ARDS improved.
Feeling better.  Normal SaO2 off oxygen while eating.  Tolerating initiation of GDMT.  Hemodynamics and rhythm stable.    Negative fluid balance.  Remains intravascularly overloaded.  Renal function stable.    IMPRESSION:  1. Late presentation anterior MI    2. Ischemic cardiomyopathy (severe LV dysfunction)    3. Acute decompensated systolic CHF (volume overload improving)    4. Left ventricular thrombus    5. COVID 19 infection (onset likely > 5-days)  Radiographic ARDS ?  Clinical improvement.    6. Acute hypoxic respiratory failure (secondary to above / improved)    7. IDDM    8. VT (quiescent)    PLAN:  Continue dual-antiplatelet therapy  Heparin for LV thrombus  Continue IV Lasix today  Continue Amiodarone  Continue Metoprolol  Start low-dose Losartan  Pulmonary consultation  Hold steroids given adequate SaO2 / lack of pulmonary decompensation in setting of late presentation MI  Tentative cath tomorrow
44-year-old male past medical history hypertension, hyperlipidemia, diabetes, active smoker presents with dyspnea and chest pain.  Patient with recent COVID infection.  Patient found to have an NSTEMI and echo with severely reduced LV systolic function.  WMA in significant LAD territory.    Given multiple risk factors patient most likely has newly diagnosed ischemic cardiomyopathy.  He needs a coronary angiogram but is unable to lie flat due to significant hypoxia, shortness of breath, and requiring NIVV.  Chest x-ray reviewed with significant bilateral infiltrates which could be residual COVID. Pulmonary consulted and is now on steroids. He notes improvement in respiratory status.    On exam he is warm and well-perfused.  He appears euvolemic with JVP~7cm. Recommend continuing maintenance torsemide 40 mg daily.  He will need an angiogram and so would hold off on SGLT2i at this time.  He is tolerating low-dose beta-blocker and losartan 25 mg daily.  Hyperkalemia improving but BP soft and so will hold off on MRA at this time.    Eduardo Jewell MD  Interventional Cardiology/Advanced Heart Failure Transplant
Patient found sitting in chair in NAD. He remains overloaded despite diuresis and CXR looks more congested today, however, he reports feeling better and is on NC from HFNC with SpO2 >92%.     Continue aggressive diuresis - Reassess volume status with IVC tomorrow   Strict I/Os   Monitor electrolytes and renal function   Continue BB, losartan for HFrEF   Heparin gtt   Continue O2 supplementation and conservative management for COVID   Monitor inflammatory markers  LHC once stable from respiratory standpoint
Patient seen, case d/w CCU team on rounds.  Agree with assessment and plan.  Cardiac status stable.  Euvolemic on exam and IVC assessment today, IVF stopped.  Hyperglycemia better controlled.  Will transfer back to SDU and continue care.
Patient seen, case d/w with SDU team on rounds.  Agree with assessment and plan.  Still hyperglycemic but improving. Increasing Insulin coverage.  Will d/c steroids today.  QT interval still prolonged but improved since yesterday.  Increase Losartan to 100 mg.  Continue cardiac monitoring.  Will need PCI of LAD once optimized.
Feels OK.  Breathing more comfortable with O2, but adequate SaO2 without.  Insufficient diuresis.  CXR more congested.  Hemodynamics / rhythm / renal function stable.    IMPRESSION:  1. Late presentation anterior MI    2. Ischemic cardiomyopathy (severe LV dysfunction)    3. Acute decompensated systolic CHF (volume overload improving)    4. Left ventricular thrombus    5. COVID 19 infection (onset likely > 5-days)  Radiographic ARDS ?  Clinical improvement.    6. Acute hypoxic respiratory failure (secondary to above / improved)    7. IDDM    8. VT (quiescent)    PLAN:  Continue dual-antiplatelet therapy  Heparin for LV thrombus  Diuresis / IV Bumex (goal negative 2-3L tomorrow)  Continue Amiodarone  Continue Metoprolol  Continue low-dose Losartan  Hold steroids given adequate SaO2 / lack of pulmonary decompensation in setting of late presentation MI  Tentative cath this week
Sustained VT overnight s/p DCCV  Hemodynamics remain stable  Adequate SaO2 on minimal NC  Negative fluid balance.  IVC borderline distended.  Renal function stable.    CT demonstrated pleural effusions (R>L) with bilateral infiltrates consistent with ARDS.    IMPRESSION:  1. Late presentation anterior MI    2. Ischemic cardiomyopathy (severe LV dysfunction)    3. Acute decompensated systolic CHF (volume overload)    4. Left ventricular thrombus    5. COVID 19 infection (onset likely > 5-days)  ARDS ?    6. Acute hypoxic respirator failure (secondary to above)    7. IDDM    PLAN:  Continue dual-antiplatelet therapy  Heparin for LV thrombus  Continue IV Lasix today  Continue nitrate  Continue Amiodarone.  Start low-dose beta-blocker.  Wean O2 as tolerated  Pulmonary consultation  Hold steroids at present given adequate SaO2 / lack of pulmonary decompensation in setting of late presentation MI  Cath when compensated

## 2025-01-12 NOTE — PROGRESS NOTE ADULT - ASSESSMENT
43 YO M with PMHx of HTN, IDDM, HLD, active smoker (1/2 PPD x 28 yrs), hepatitis B (s/p treatment) presented with CC chest pressure and SOB. [facility]     # Possible ARDS on admission   # Late presentation anterior MI   # Acute HFrEF, ICM, EF 25-30%  # Sustained monomorphic slow Vtach; 12 min overnight on 12/28 with rate ~ 140s  # LV thrombus  # HTN  - Resumed ASA 81 QD   - On plavix 75mg qd, Lovenox 100mg q12  - Increased Losartan to 100mg PO QD  - Hold off BB (Low CO; CI 1.67)  - s/p lidocaine  and s/p amio drip for vtach , now on PO , c/w amiodarone 400 mg BID PO. today day 13  - Viability study showed nonviable myocardium  - EP following for VT  - Procal 0.22   - MRSA swab > Negative   - Pulm consulted; minimal pleural effusion ;  On IV steroids ARDS dosing  - HF team following . approaching euvolemia, now on PO torsemide 40 mg QD ( Net negative UO about 3 L )   - ID consult appreciated  - : steroids started ARDS dose ; Decadron 20 mg IV>>changed to 10 mg ( Hyperglycemia)   - Monitor intake and output   - BMP BID   - Keep K > 4.0 and Mag > 2.0.     #HLD  #IDDM w/ hyperglycemia  -TSH 0.99  -A1c 10.8 - DM control  -  -Hyperglycemic worsening due to steroids.   -adjust insulin reg to keep -180    DVT PPX: Lovenox  Diet: DASH CC with fluid restriction  DISPO: Cardiac SDU   43 YO M with PMHx of HTN, IDDM, HLD, active smoker (1/2 PPD x 28 yrs), hepatitis B (s/p treatment) presented with CC chest pressure and SOB. [facility]     # Possible ARDS on admission   # Late presentation anterior MI   # Acute HFrEF, ICM, EF 25-30%  # Sustained monomorphic slow Vtach; 12 min overnight on 12/28 with rate ~ 140s  # LV thrombus  # HTN  - Resumed ASA 81 QD   - On plavix 75mg qd, Lovenox 100mg q12  - Increased Losartan to 100mg PO QD  - Hold off BB (Low CO; CI 1.67)  - s/p lidocaine  and s/p amio drip for vtach , now on PO , c/w amiodarone 400 mg BID PO. today day 13  - Viability study showed nonviable myocardium  - EP following for VT  - Procal 0.22   - MRSA swab > Negative   - Pulm consulted; minimal pleural effusion ;  On IV steroids ARDS dosing  - Euvolemic as of today; no need of IVF/diuresis   - ID consult appreciated  - : steroids started ARDS dose ; Decadron 20 mg IV>>changed to 10 mg ( Hyperglycemia)   - Monitor intake and output   - BMP BID   - Steroids discontinued today; patient has clear lung sounds, decreasing oxygen requirements; low threshold to restart incase deteriorates  - Keep K > 4.0 and Mag > 2.0.     #HLD  #IDDM w/ hyperglycemia  -TSH 0.99  -A1c 10.8 - DM control  -  -Hyperglycemic worsening due to steroids. (see above)  -adjust insulin reg to keep -180  - Insulin decreased to Lantus 30 and Lispro 10 TID    DVT PPX: Lovenox  Diet: DASH CC with fluid restriction  DISPO: Cardiac SDU

## 2025-01-12 NOTE — PROGRESS NOTE ADULT - ATTENDING SUPERVISION STATEMENT
Resident
Fellow
Resident/Fellow
Fellow
Fellow
Resident/Fellow
Fellow
Resident/Fellow
Resident/Fellow
Fellow

## 2025-01-13 LAB
ALBUMIN SERPL ELPH-MCNC: 3.3 G/DL — LOW (ref 3.5–5.2)
ALP SERPL-CCNC: 154 U/L — HIGH (ref 30–115)
ALT FLD-CCNC: 111 U/L — HIGH (ref 0–41)
ANION GAP SERPL CALC-SCNC: 11 MMOL/L — SIGNIFICANT CHANGE UP (ref 7–14)
AST SERPL-CCNC: 82 U/L — HIGH (ref 0–41)
BASOPHILS # BLD AUTO: 0.14 K/UL — SIGNIFICANT CHANGE UP (ref 0–0.2)
BASOPHILS NFR BLD AUTO: 0.7 % — SIGNIFICANT CHANGE UP (ref 0–1)
BILIRUB SERPL-MCNC: 0.2 MG/DL — SIGNIFICANT CHANGE UP (ref 0.2–1.2)
BUN SERPL-MCNC: 30 MG/DL — HIGH (ref 10–20)
CALCIUM SERPL-MCNC: 8.8 MG/DL — SIGNIFICANT CHANGE UP (ref 8.4–10.5)
CHLORIDE SERPL-SCNC: 96 MMOL/L — LOW (ref 98–110)
CO2 SERPL-SCNC: 20 MMOL/L — SIGNIFICANT CHANGE UP (ref 17–32)
CREAT SERPL-MCNC: 0.8 MG/DL — SIGNIFICANT CHANGE UP (ref 0.7–1.5)
EGFR: 112 ML/MIN/1.73M2 — SIGNIFICANT CHANGE UP
EOSINOPHIL # BLD AUTO: 0.29 K/UL — SIGNIFICANT CHANGE UP (ref 0–0.7)
EOSINOPHIL NFR BLD AUTO: 1.5 % — SIGNIFICANT CHANGE UP (ref 0–8)
GLUCOSE BLDC GLUCOMTR-MCNC: 125 MG/DL — HIGH (ref 70–99)
GLUCOSE BLDC GLUCOMTR-MCNC: 147 MG/DL — HIGH (ref 70–99)
GLUCOSE BLDC GLUCOMTR-MCNC: 152 MG/DL — HIGH (ref 70–99)
GLUCOSE BLDC GLUCOMTR-MCNC: 173 MG/DL — HIGH (ref 70–99)
GLUCOSE BLDC GLUCOMTR-MCNC: 182 MG/DL — HIGH (ref 70–99)
GLUCOSE SERPL-MCNC: 206 MG/DL — HIGH (ref 70–99)
HCT VFR BLD CALC: 34.8 % — LOW (ref 42–52)
HGB BLD-MCNC: 11.5 G/DL — LOW (ref 14–18)
IMM GRANULOCYTES NFR BLD AUTO: 8.5 % — HIGH (ref 0.1–0.3)
LYMPHOCYTES # BLD AUTO: 17 % — LOW (ref 20.5–51.1)
LYMPHOCYTES # BLD AUTO: 3.35 K/UL — SIGNIFICANT CHANGE UP (ref 1.2–3.4)
MAGNESIUM SERPL-MCNC: 2.1 MG/DL — SIGNIFICANT CHANGE UP (ref 1.8–2.4)
MCHC RBC-ENTMCNC: 30.6 PG — SIGNIFICANT CHANGE UP (ref 27–31)
MCHC RBC-ENTMCNC: 33 G/DL — SIGNIFICANT CHANGE UP (ref 32–37)
MCV RBC AUTO: 92.6 FL — SIGNIFICANT CHANGE UP (ref 80–94)
MONOCYTES # BLD AUTO: 1.29 K/UL — HIGH (ref 0.1–0.6)
MONOCYTES NFR BLD AUTO: 6.5 % — SIGNIFICANT CHANGE UP (ref 1.7–9.3)
NEUTROPHILS # BLD AUTO: 13.01 K/UL — HIGH (ref 1.4–6.5)
NEUTROPHILS NFR BLD AUTO: 65.8 % — SIGNIFICANT CHANGE UP (ref 42.2–75.2)
NRBC # BLD: 0 /100 WBCS — SIGNIFICANT CHANGE UP (ref 0–0)
PLATELET # BLD AUTO: 416 K/UL — HIGH (ref 130–400)
PMV BLD: 9.7 FL — SIGNIFICANT CHANGE UP (ref 7.4–10.4)
POTASSIUM SERPL-MCNC: 4.8 MMOL/L — SIGNIFICANT CHANGE UP (ref 3.5–5)
POTASSIUM SERPL-SCNC: 4.8 MMOL/L — SIGNIFICANT CHANGE UP (ref 3.5–5)
PROT SERPL-MCNC: 6 G/DL — SIGNIFICANT CHANGE UP (ref 6–8)
RBC # BLD: 3.76 M/UL — LOW (ref 4.7–6.1)
RBC # FLD: 12.8 % — SIGNIFICANT CHANGE UP (ref 11.5–14.5)
SODIUM SERPL-SCNC: 127 MMOL/L — LOW (ref 135–146)
WBC # BLD: 19.76 K/UL — HIGH (ref 4.8–10.8)
WBC # FLD AUTO: 19.76 K/UL — HIGH (ref 4.8–10.8)

## 2025-01-13 PROCEDURE — 99233 SBSQ HOSP IP/OBS HIGH 50: CPT

## 2025-01-13 PROCEDURE — 93010 ELECTROCARDIOGRAM REPORT: CPT

## 2025-01-13 RX ORDER — SODIUM CHLORIDE 9 MG/ML
500 INJECTION, SOLUTION INTRAVENOUS
Refills: 0 | Status: COMPLETED | OUTPATIENT
Start: 2025-01-13 | End: 2025-01-13

## 2025-01-13 RX ADMIN — ENOXAPARIN SODIUM 100 MILLIGRAM(S): 60 INJECTION INTRAVENOUS; SUBCUTANEOUS at 05:42

## 2025-01-13 RX ADMIN — ACETAMINOPHEN 650 MILLIGRAM(S): 80 SOLUTION/ DROPS ORAL at 22:49

## 2025-01-13 RX ADMIN — CETIRIZINE HYDROCHLORIDE 10 MILLIGRAM(S): 5 SYRUP ORAL at 11:53

## 2025-01-13 RX ADMIN — NICOTINE POLACRILEX 1 PATCH: 4 LOZENGE ORAL at 05:47

## 2025-01-13 RX ADMIN — AMIODARONE HYDROCHLORIDE 200 MILLIGRAM(S): 200 TABLET ORAL at 05:43

## 2025-01-13 RX ADMIN — PANTOPRAZOLE 40 MILLIGRAM(S): 40 TABLET, DELAYED RELEASE ORAL at 08:11

## 2025-01-13 RX ADMIN — Medication 100 MILLIGRAM(S): at 05:42

## 2025-01-13 RX ADMIN — Medication 5 MILLIGRAM(S): at 22:17

## 2025-01-13 RX ADMIN — Medication 2: at 08:11

## 2025-01-13 RX ADMIN — CHLORHEXIDINE GLUCONATE 1 APPLICATION(S): 1.2 RINSE ORAL at 05:43

## 2025-01-13 RX ADMIN — INSULIN GLARGINE-YFGN 30 UNIT(S): 100 INJECTION, SOLUTION SUBCUTANEOUS at 21:36

## 2025-01-13 RX ADMIN — ACETAMINOPHEN 650 MILLIGRAM(S): 80 SOLUTION/ DROPS ORAL at 22:17

## 2025-01-13 RX ADMIN — Medication 100 MILLIGRAM(S): at 21:09

## 2025-01-13 RX ADMIN — LOSARTAN POTASSIUM 100 MILLIGRAM(S): 100 TABLET, FILM COATED ORAL at 05:43

## 2025-01-13 RX ADMIN — Medication 2: at 17:26

## 2025-01-13 RX ADMIN — NICOTINE POLACRILEX 1 PATCH: 4 LOZENGE ORAL at 05:37

## 2025-01-13 RX ADMIN — NICOTINE POLACRILEX 1 PATCH: 4 LOZENGE ORAL at 20:37

## 2025-01-13 RX ADMIN — NICOTINE POLACRILEX 1 PATCH: 4 LOZENGE ORAL at 07:39

## 2025-01-13 RX ADMIN — ACETAMINOPHEN 650 MILLIGRAM(S): 80 SOLUTION/ DROPS ORAL at 12:44

## 2025-01-13 RX ADMIN — SODIUM CHLORIDE 500 MILLILITER(S): 9 INJECTION, SOLUTION INTRAVENOUS at 12:44

## 2025-01-13 RX ADMIN — CLOPIDOGREL BISULFATE 75 MILLIGRAM(S): 75 TABLET, FILM COATED ORAL at 11:53

## 2025-01-13 RX ADMIN — Medication 10 UNIT(S): at 17:26

## 2025-01-13 RX ADMIN — Medication 10 UNIT(S): at 11:53

## 2025-01-13 RX ADMIN — ACETAMINOPHEN 650 MILLIGRAM(S): 80 SOLUTION/ DROPS ORAL at 13:15

## 2025-01-13 RX ADMIN — Medication 100 MILLIGRAM(S): at 13:58

## 2025-01-13 RX ADMIN — Medication 100 MILLIGRAM(S): at 17:27

## 2025-01-13 RX ADMIN — Medication 81 MILLIGRAM(S): at 11:53

## 2025-01-13 RX ADMIN — Medication 10 UNIT(S): at 08:11

## 2025-01-13 NOTE — PROGRESS NOTE ADULT - ASSESSMENT
45 YO M with PMHx of HTN, IDDM, HLD, active smoker (1/2 PPD x 28 yrs), hepatitis B (s/p treatment) presented with CC chest pressure and SOB. [facility]     # Possible ARDS on admission   # Late presentation anterior MI   # Acute HFrEF, ICM, EF 25-30%  # Sustained monomorphic slow Vtach; 12 min overnight on 12/28 with rate ~ 140s  # LV thrombus  # HTN  - Satting well on 2L nasal canula. Not in distress  - Resumed ASA 81 QD   - On plavix 75mg qd, Lovenox 100mg q12  - Increased Losartan to 100mg PO QD yesterday  - Hold off BB (Low CO; CI 1.67)  - s/p lidocaine  and s/p amio drip for vtach , now on PO , c/w amiodarone 400 mg BID PO. today day 14  - Viability study showed nonviable myocardium  - EP following for VT  - Procal 0.22   - MRSA swab > Negative   - Pulm consulted; minimal pleural effusion ;  On IV steroids ARDS dosing  - Euvolemic as of today; no need of IVF/diuresis   - ID consult appreciated  - : steroids started ARDS dose ; Decadron 20 mg IV>>changed to 10 mg then off due to hyperglycemia  - Monitor intake and output   - BMP BID   - Steroids discontinued yesterday; patient has clear lung sounds, decreasing oxygen requirements; low threshold to restart incase deteriorates  - Keep K > 4.0 and Mag > 2.0.     #HLD  #IDDM w/ hyperglycemia  -TSH 0.99  -A1c 10.8 - DM control  -  -Hyperglycemia was worsening due to steroids. (see above)  -adjust insulin reg to keep -180  -On Lantus 30 and Lispro 10 TID    DVT PPX: Lovenox  Diet: DASH CC with fluid restriction  full code     43 YO M with PMHx of HTN, IDDM, HLD, active smoker (1/2 PPD x 28 yrs), hepatitis B (s/p treatment) presented with CC chest pressure and SOB. [facility]     # Possible ARDS on admission   # Late presentation anterior MI   # Acute HFrEF, ICM, EF 25-30%  # Sustained monomorphic slow Vtach; 12 min overnight on 12/28 with rate ~ 140s  # LV thrombus  # HTN  - Satting well on 2L nasal canula. Not in distress  - Resumed ASA 81 QD   - On plavix 75mg qd, Lovenox 100mg q12  - Increased Losartan to 100mg PO QD yesterday  - Hold off BB (Low CO; CI 1.67)  - s/p lidocaine  and s/p amio drip for vtach , now on PO , c/w amiodarone 400 mg BID PO. today day 14  - Viability study showed nonviable myocardium  - EP following for VT  - Procal 0.22   - MRSA swab > Negative   - Pulm consulted; minimal pleural effusion ;  On IV steroids ARDS dosing  - Euvolemic as of today; no need of IVF/diuresis   - ID consult appreciated  - : steroids started ARDS dose ; Decadron 20 mg IV>>changed to 10 mg then off due to hyperglycemia  - Monitor intake and output   - Steroids discontinued yesterday; patient has clear lung sounds, decreasing oxygen requirements; low threshold to restart incase deteriorates  - Keep K > 4.0 and Mag > 2.0.   - Based on the patient's IVC, will give 500 cc LR. Collapsable  - NPO after midnight. Patient will undergo stent placement for the 2 vessels disease  - Reached out to EP regarding cath tmrw, possible LifeVest      #HLD  #IDDM w/ hyperglycemia  -TSH 0.99  -A1c 10.8 - DM control  -  -Hyperglycemia was worsening due to steroids. (see above)  -adjust insulin reg to keep -180  -On Lantus 30 and Lispro 10 TID    DVT PPX: Lovenox  Diet: DASH CC with fluid restriction  full code

## 2025-01-13 NOTE — PROGRESS NOTE ADULT - ASSESSMENT
BENOIT Marley    45 YO M with PMHx of HTN, IDDM, HLD, active smoker (1/2 PPD x 28 yrs), hepatitis B (s/p treatment) presented with CC chest pressure and SOB. Patient was admitted for late presentation of anterolateral NSTEMI.  VT on admission in the settings of MI  LHC with LAD and OM1 disease     Impression:  Sustained VT  NSTEMI  Ischemic CM EF 25-30%  Anterior MI  COVID (+)/ARDS  HTN  HLD  DM  Hep B    Plan:  con't tele  staged PCI as per plan  repeat echocardiogram after PCI  --> if EF< 35%  --> wearable cardiac defibrillator   --> if EF > 35% --> loop recorder  con't amio  Cont GDMT  Maintain electrolytes K>4.0 Mg >2.0      BENOIT Marley    45 YO M with PMHx of HTN, IDDM, HLD, active smoker (1/2 PPD x 28 yrs), hepatitis B (s/p treatment) presented with CC chest pressure and SOB. Patient was admitted for late presentation of anterolateral NSTEMI.  VT on admission in the settings of MI  LHC with LAD and OM1 disease     Impression:  Sustained VT  NSTEMI  Ischemic CM EF 25-30%  Anterior MI  COVID (+)/ARDS  HTN  HLD  DM  Hep B    Plan:  con't tele  staged PCI as per plan as outpatient  repeat echocardiogram after PCI  patient meets criteria for ICD implantation in light of sustained VT and depressed LV EF. Will plan for WCD meantime.   con't amio  Cont GDMT  Maintain electrolytes K>4.0 Mg >2.0

## 2025-01-13 NOTE — PROGRESS NOTE ADULT - TIME BILLING
I have personally seen and examined this patient.    I have reviewed all pertinent clinical information and reviewed all relevant imaging and diagnostic studies personally.   I counseled the patient about diagnostic testing and treatment plan. All questions were answered.   I discussed recommendations with the primary team.
evaluation, chart review, diagnostic test review.
Complex case, care coordination
evaluation, chart review, diagnostic test review.

## 2025-01-13 NOTE — PROGRESS NOTE ADULT - SUBJECTIVE AND OBJECTIVE BOX
INTERVAL HPI/OVERNIGHT EVENTS:  chart reviewed  no significant events on tele  s/p cath, 2vCAD, LAD and OM, planned for starged PCI    MEDICATIONS  (STANDING):  aMIOdarone    Tablet 200 milliGRAM(s) Oral daily  aspirin enteric coated 81 milliGRAM(s) Oral daily  cefepime   IVPB 2000 milliGRAM(s) IV Intermittent every 8 hours  cefepime   IVPB      cetirizine 10 milliGRAM(s) Oral daily  chlorhexidine 2% Cloths 1 Application(s) Topical daily  clopidogrel Tablet 75 milliGRAM(s) Oral daily  dextrose 50% Injectable 50 milliLiter(s) IV Push every 15 minutes  doxycycline IVPB      doxycycline IVPB 100 milliGRAM(s) IV Intermittent every 12 hours  glucagon  Injectable 1 milliGRAM(s) IntraMuscular once  insulin glargine Injectable (LANTUS) 30 Unit(s) SubCutaneous at bedtime  insulin lispro (ADMELOG) corrective regimen sliding scale   SubCutaneous three times a day before meals  insulin lispro Injectable (ADMELOG) 10 Unit(s) SubCutaneous three times a day before meals  losartan 100 milliGRAM(s) Oral daily  melatonin 5 milliGRAM(s) Oral at bedtime  nicotine -  14 mG/24Hr(s) Patch 1 Patch Transdermal every 24 hours  pantoprazole  Injectable 40 milliGRAM(s) IV Push with breakfast  regadenoson Injectable 0.4 milliGRAM(s) IV Push once    MEDICATIONS  (PRN):  acetaminophen     Tablet .. 650 milliGRAM(s) Oral every 6 hours PRN Mild Pain (1 - 3)  albuterol/ipratropium for Nebulization 3 milliLiter(s) Nebulizer every 6 hours PRN Shortness of Breath and/or Wheezing  benzocaine/menthol Lozenge 1 Lozenge Oral every 3 hours PRN Sore Throat  benzonatate 100 milliGRAM(s) Oral every 8 hours PRN Cough  guaifenesin/dextromethorphan Oral Liquid 10 milliLiter(s) Oral every 4 hours PRN Cough      Allergies    Seafood (Urticaria)  No Known Drug Allergies  shellfish (Urticaria; Rash; Short breath)    Intolerances        REVIEW OF SYSTEMS    [ ] A ten-point review of systems was otherwise negative except as noted.  [ ] Due to altered mental status/intubation, subjective information were not able to be obtained from the patient. History was obtained, to the extent possible, from review of the chart and collateral sources of information.      Vital Signs Last 24 Hrs  T(C): 36.4 (13 Jan 2025 07:48), Max: 36.8 (12 Jan 2025 16:34)  T(F): 97.5 (13 Jan 2025 07:48), Max: 98.2 (12 Jan 2025 16:34)  HR: 72 (13 Jan 2025 11:05) (65 - 77)  BP: 106/76 (13 Jan 2025 11:05) (89/53 - 114/55)  BP(mean): 83 (13 Jan 2025 11:05) (64 - 83)  RR: 18 (13 Jan 2025 11:05) (18 - 20)  SpO2: 95% (13 Jan 2025 11:05) (95% - 99%)    Parameters below as of 13 Jan 2025 07:50  Patient On (Oxygen Delivery Method): nasal cannula  O2 Flow (L/min): 1.5      01-12-25 @ 07:01  -  01-13-25 @ 07:00  --------------------------------------------------------  IN: 760 mL / OUT: 2050 mL / NET: -1290 mL    01-13-25 @ 07:01 - 01-13-25 @ 15:07  --------------------------------------------------------  IN: 740 mL / OUT: 400 mL / NET: 340 mL        PHYSICAL EXAM:    GENERAL: In no apparent distress, well nourished, and hydrated.  HEART: Regular rate and rhythm; No murmur; NO rubs, or gallops.  PULMONARY: Clear to auscultation and percussion.  Normal expansion/effort. No rales, wheezing, or rhonchi bilaterally.  ABDOMEN: Soft, Nontender, Nondistended; Bowel sounds present  EXTREMITIES:  Extremities warm, pink, well-perfused, 2+ Peripheral Pulses, No clubbing, cyanosis, or edema  NEUROLOGICAL: alert & oriented x 3, no focal deficits, PERRLA, EOMI    LABS:                        11.5   19.76 )-----------( 416      ( 13 Jan 2025 05:48 )             34.8     01-13    127[L]  |  96[L]  |  30[H]  ----------------------------<  206[H]  4.8   |  20  |  0.8    Ca    8.8      13 Jan 2025 05:48  Mg     2.1     01-13    TPro  6.0  /  Alb  3.3[L]  /  TBili  0.2  /  DBili  x   /  AST  82[H]  /  ALT  111[H]  /  AlkPhos  154[H]  01-13            12 Lead ECG:   Ventricular Rate 77 BPM    Atrial Rate 77 BPM    P-R Interval 194 ms    QRS Duration 108 ms    Q-T Interval 466 ms    QTC Calculation(Bazett) 527 ms    P Axis 0 degrees    R Axis 112 degrees    T Axis 75 degrees    Diagnosis Line Normal sinus rhythm  Anterolateral infarct , age undetermined  Prolonged QT  Abnormal ECG    Confirmed by REANNA DELUCA MD [id number]) on 1/12/2025 10:43:24 PM (01-12-25 @ 08:22)  12 Lead ECG:   Ventricular Rate 66 BPM    Atrial Rate 66 BPM    P-R Interval 202 ms    QRS Duration 94 ms    Q-T Interval 558 ms    QTC Calculation(Bazett) 584 ms    P Axis 12 degrees    R Axis 113 degrees    T Axis 135 degrees    Diagnosis Line Normal sinus rhythm  Anterolateral infarct , age undetermined  Prolonged QT  Abnormal ECG    Confirmed by REANNA DELUCA MD [id number]) on 1/11/2025 10:58:07 PM (01-11-25 @ 12:11)      RADIOLOGY & ADDITIONAL TESTS:    R+LHC  FINDINGS:     Coronary Dominance: Right      LM: No disease    LAD: Mid LAD with 90% lesion noted  - Distal LAD with severe diffuse disease  D1: severe disease in the ostial Diagonal branch of the LAD    CX: minor luminal irregularities small vessel  OM1: 70% lesion noted in the OM1    Ramus: small vessel minor luminal irregularities    RCA: Distal RCA with mild atherosclerosis noted  RPDA: Minor Luminal irregularities  RPL: Large vessel with minor luminal irregularities       LVEDP: Not obtained    EF: 30 %       RHC Findings:     RA: 4/2 (3)  RV: 20/2 (3)  PA: 25/10 (15)  PAWP: 3    PA saturation: 51.9%  Arterial saturation: 91.1%  MAGGIE CO/CI: 3.49/1.67    SVR: 1398.79 dyn*s/cm5  PVR: 3.15 MAN

## 2025-01-13 NOTE — PROGRESS NOTE ADULT - SUBJECTIVE AND OBJECTIVE BOX
SARAH FARMER  44y, Male  Allergy: Seafood (Urticaria)  No Known Drug Allergies  shellfish (Urticaria; Rash; Short breath)      LOS  18d    CHIEF COMPLAINT: MI (04 Jan 2025 12:40)      INTERVAL EVENTS/HPI  - No acute events overnight  - T(F): , Max: 98.2 (01-12-25 @ 16:34)  - on 2L NC   - WBC Count: 19.76 (01-13-25 @ 05:48)  WBC Count: 18.66 (01-12-25 @ 07:06)     - Creatinine: 0.8 (01-13-25 @ 05:48)  Creatinine: 0.9 (01-12-25 @ 07:06)       ROS  General: Denies rigors, nightsweats  HEENT: Denies headache, rhinorrhea, sore throat, eye pain  CV: Denies CP, palpitations  PULM: Denies wheezing, hemoptysis  GI: Denies hematemesis, hematochezia, melena  : Denies discharge, hematuria  MSK: Denies arthralgias, myalgias  SKIN: Denies rash, lesions  NEURO: Denies paresthesias, weakness  PSYCH: Denies depression, anxiety    VITALS:  T(F): 97.5, Max: 98.2 (01-12-25 @ 16:34)  HR: 72  BP: 114/55  RR: 18Vital Signs Last 24 Hrs  T(C): 36.4 (13 Jan 2025 07:48), Max: 36.8 (12 Jan 2025 16:34)  T(F): 97.5 (13 Jan 2025 07:48), Max: 98.2 (12 Jan 2025 16:34)  HR: 72 (13 Jan 2025 07:50) (65 - 77)  BP: 114/55 (13 Jan 2025 07:48) (89/53 - 114/55)  BP(mean): 74 (13 Jan 2025 07:48) (64 - 81)  RR: 18 (13 Jan 2025 05:10) (18 - 20)  SpO2: 97% (13 Jan 2025 07:50) (93% - 99%)    Parameters below as of 13 Jan 2025 07:50  Patient On (Oxygen Delivery Method): nasal cannula  O2 Flow (L/min): 1.5      PHYSICAL EXAM:  Gen: NAD, resting in bed  HEENT: Normocephalic, atraumatic  Neck: supple, no lymphadenopathy  CV: Regular rate & regular rhythm  Lungs: decreased BS at bases, no fremitus  Abdomen: Soft, BS present  Ext: Warm, well perfused  Neuro: non focal, awake  Skin: no rash, no erythema  Lines: no phlebitis    FH: Non-contributory  Social Hx: Non-contributory    TESTS & MEASUREMENTS:                        11.5   19.76 )-----------( 416      ( 13 Jan 2025 05:48 )             34.8     01-13    127[L]  |  96[L]  |  30[H]  ----------------------------<  206[H]  4.8   |  20  |  0.8    Ca    8.8      13 Jan 2025 05:48  Mg     2.1     01-13    TPro  6.0  /  Alb  3.3[L]  /  TBili  0.2  /  DBili  x   /  AST  82[H]  /  ALT  111[H]  /  AlkPhos  154[H]  01-13      LIVER FUNCTIONS - ( 13 Jan 2025 05:48 )  Alb: 3.3 g/dL / Pro: 6.0 g/dL / ALK PHOS: 154 U/L / ALT: 111 U/L / AST: 82 U/L / GGT: x           Urinalysis Basic - ( 13 Jan 2025 05:48 )    Color: x / Appearance: x / SG: x / pH: x  Gluc: 206 mg/dL / Ketone: x  / Bili: x / Urobili: x   Blood: x / Protein: x / Nitrite: x   Leuk Esterase: x / RBC: x / WBC x   Sq Epi: x / Non Sq Epi: x / Bacteria: x        Culture - Blood (collected 01-06-25 @ 11:13)  Source: .Blood BLOOD  Final Report (01-11-25 @ 22:00):    No growth at 5 days    Culture - Blood (collected 12-26-24 @ 23:37)  Source: .Blood BLOOD  Final Report (01-01-25 @ 07:00):    No growth at 5 days    Culture - Blood (collected 12-26-24 @ 23:37)  Source: .Blood BLOOD  Final Report (01-01-25 @ 07:00):    No growth at 5 days        Lactate, Blood: 1.4 mmol/L (01-11-25 @ 04:44)  Lactate, Blood: 4.5 mmol/L (01-10-25 @ 23:14)  Blood Gas Venous - Lactate: 3.5 mmol/L (01-10-25 @ 14:25)  Lactate, Blood: 2.6 mmol/L (01-09-25 @ 22:07)  Lactate, Blood: 2.5 mmol/L (01-09-25 @ 17:25)      INFECTIOUS DISEASES TESTING  Fungitell: <31 (01-07-25 @ 23:50)  MRSA PCR Result.: Negative (01-06-25 @ 12:20)  Procalcitonin: 0.22 (01-06-25 @ 11:13)  MRSA PCR Result.: Negative (12-30-24 @ 22:36)  Procalcitonin: 0.16 (12-30-24 @ 10:40)      INFLAMMATORY MARKERS  C-Reactive Protein: 154.1 mg/L (01-06-25 @ 16:43)  C-Reactive Protein: 43.5 mg/L (01-04-25 @ 06:06)  C-Reactive Protein: 55.6 mg/L (01-03-25 @ 05:25)  C-Reactive Protein: 69.2 mg/L (01-02-25 @ 05:14)      RADIOLOGY & ADDITIONAL TESTS:  I have personally reviewed the last available Chest xray  CXR      CT      CARDIOLOGY TESTING  12 Lead ECG:   Ventricular Rate 77 BPM    Atrial Rate 77 BPM    P-R Interval 194 ms    QRS Duration 108 ms    Q-T Interval 466 ms    QTC Calculation(Bazett) 527 ms    P Axis 0 degrees    R Axis 112 degrees    T Axis 75 degrees    Diagnosis Line Normal sinus rhythm  Anterolateral infarct , age undetermined  Prolonged QT  Abnormal ECG    Confirmed by REANNA DELUCA MD [id number]) on 1/12/2025 10:43:24 PM (01-12-25 @ 08:22)  12 Lead ECG:   Ventricular Rate 66 BPM    Atrial Rate 66 BPM    P-R Interval 202 ms    QRS Duration 94 ms    Q-T Interval 558 ms    QTC Calculation(Bazett) 584 ms    P Axis 12 degrees    R Axis 113 degrees    T Axis 135 degrees    Diagnosis Line Normal sinus rhythm  Anterolateral infarct , age undetermined  Prolonged QT  Abnormal ECG    Confirmed by REANNA DELUCA MD [id number]) on 1/11/2025 10:58:07 PM (01-11-25 @ 12:11)      MEDICATIONS  aMIOdarone    Tablet 200 Oral daily  aspirin enteric coated 81 Oral daily  cefepime   IVPB 2000 IV Intermittent every 8 hours  cefepime   IVPB     cetirizine 10 Oral daily  chlorhexidine 2% Cloths 1 Topical daily  clopidogrel Tablet 75 Oral daily  dextrose 50% Injectable 50 IV Push every 15 minutes  doxycycline IVPB     doxycycline IVPB 100 IV Intermittent every 12 hours  glucagon  Injectable 1 IntraMuscular once  insulin glargine Injectable (LANTUS) 30 SubCutaneous at bedtime  insulin lispro (ADMELOG) corrective regimen sliding scale  SubCutaneous three times a day before meals  insulin lispro Injectable (ADMELOG) 10 SubCutaneous three times a day before meals  lactated ringers. 500 IV Continuous <Continuous>  losartan 100 Oral daily  melatonin 5 Oral at bedtime  nicotine -  14 mG/24Hr(s) Patch 1 Transdermal every 24 hours  pantoprazole  Injectable 40 IV Push with breakfast  regadenoson Injectable 0.4 IV Push once      WEIGHT  Weight (kg): 96 (01-10-25 @ 08:09)  Creatinine: 0.8 mg/dL (01-13-25 @ 05:48)      ANTIBIOTICS:  cefepime   IVPB 2000 milliGRAM(s) IV Intermittent every 8 hours  cefepime   IVPB      doxycycline IVPB      doxycycline IVPB 100 milliGRAM(s) IV Intermittent every 12 hours      All available historical records have been reviewed

## 2025-01-13 NOTE — PROGRESS NOTE ADULT - NS ATTEND AMEND GEN_ALL_CORE FT
GDMT  PCI as planned  Re assess EF after PCI  Wearable defib vs ILR as per EF  Detailed discussion with pt + wife at bedside

## 2025-01-13 NOTE — PROGRESS NOTE ADULT - SUBJECTIVE AND OBJECTIVE BOX
SUBJECTIVE / OVERNIGHT EVENTS  Patient was seen and examined. Patient is resting comfortably in bed. satting well on 2L nasal canula. Does not report chest pain, sob, or palpitations    MEDICATIONS  aMIOdarone    Tablet 200 milliGRAM(s) Oral daily  aspirin enteric coated 81 milliGRAM(s) Oral daily  cefepime   IVPB 2000 milliGRAM(s) IV Intermittent every 8 hours  cefepime   IVPB      cetirizine 10 milliGRAM(s) Oral daily  chlorhexidine 2% Cloths 1 Application(s) Topical daily  clopidogrel Tablet 75 milliGRAM(s) Oral daily  dextrose 50% Injectable 50 milliLiter(s) IV Push every 15 minutes  doxycycline IVPB      doxycycline IVPB 100 milliGRAM(s) IV Intermittent every 12 hours  enoxaparin Injectable 100 milliGRAM(s) SubCutaneous every 12 hours  glucagon  Injectable 1 milliGRAM(s) IntraMuscular once  insulin glargine Injectable (LANTUS) 30 Unit(s) SubCutaneous at bedtime  insulin lispro (ADMELOG) corrective regimen sliding scale   SubCutaneous three times a day before meals  insulin lispro Injectable (ADMELOG) 10 Unit(s) SubCutaneous three times a day before meals  losartan 100 milliGRAM(s) Oral daily  melatonin 5 milliGRAM(s) Oral at bedtime  nicotine -  14 mG/24Hr(s) Patch 1 Patch Transdermal every 24 hours  pantoprazole  Injectable 40 milliGRAM(s) IV Push with breakfast  regadenoson Injectable 0.4 milliGRAM(s) IV Push once    acetaminophen     Tablet .. 650 milliGRAM(s) Oral every 6 hours PRN Mild Pain (1 - 3)  albuterol/ipratropium for Nebulization 3 milliLiter(s) Nebulizer every 6 hours PRN Shortness of Breath and/or Wheezing  benzocaine/menthol Lozenge 1 Lozenge Oral every 3 hours PRN Sore Throat  benzonatate 100 milliGRAM(s) Oral every 8 hours PRN Cough  guaifenesin/dextromethorphan Oral Liquid 10 milliLiter(s) Oral every 4 hours PRN Cough    VITALS /  EXAM    T(C): 36.4 (01-13-25 @ 07:48), Max: 36.8 (01-12-25 @ 16:34)  HR: 72 (01-13-25 @ 07:50) (65 - 77)  BP: 114/55 (01-13-25 @ 07:48) (89/53 - 114/55)  RR: 18 (01-13-25 @ 05:10) (18 - 20)  SpO2: 97% (01-13-25 @ 07:50) (93% - 99%)  POCT Blood Glucose.: 182 mg/dL (01-13-25 @ 07:57)  POCT Blood Glucose.: 219 mg/dL (01-12-25 @ 21:10)  POCT Blood Glucose.: 231 mg/dL (01-12-25 @ 16:33)  POCT Blood Glucose.: 279 mg/dL (01-12-25 @ 11:02)    GENERAL: NAD, well-developed  CHEST/LUNG: Clear to auscultation bilaterally; No wheezes, rales or rhonchi  HEART: Regular rate and rhythm; No murmurs, rubs, or gallops  ABDOMEN: Soft, Nontender, Nondistended; Bowel sounds present, no masses.  EXTREMITIES:  2+ Peripheral Pulses, No clubbing, cyanosis, or edema    I's & O's     01-12-25 @ 07:01  -  01-13-25 @ 07:00  --------------------------------------------------------  IN:    IV PiggyBack: 200 mL    Oral Fluid: 560 mL  Total IN: 760 mL    OUT:    Voided (mL): 2050 mL  Total OUT: 2050 mL    Total NET: -1290 mL        LABS             11.5   19.76 )-----------( 416      ( 01-13-25 @ 05:48 )             34.8     127  |  96  |  30  -------------------------<  206   01-13-25 @ 05:48  4.8  |  20  |  0.8    Ca      8.8     01-13-25 @ 05:48  Mg     2.1     01-13-25 @ 05:48    TPro  6.0  /  Alb  3.3  /  TBili  0.2  /  DBili  x   /  AST  82  /  ALT  111  /  AlkPhos  154  /  GGT  x     01-13-25 @ 05:48                    Urinalysis Basic - ( 13 Jan 2025 05:48 )    Color: x / Appearance: x / SG: x / pH: x  Gluc: 206 mg/dL / Ketone: x  / Bili: x / Urobili: x   Blood: x / Protein: x / Nitrite: x   Leuk Esterase: x / RBC: x / WBC x   Sq Epi: x / Non Sq Epi: x / Bacteria: x      MICRO / IMAGING / CARDIOLOGY  Telemetry: Reviewed   EKG: Reviewed    CULTURES    IMAGING  PACS Image:  (01-12-25 @ 13:31)    CARDIOLOGY

## 2025-01-13 NOTE — PROGRESS NOTE ADULT - ASSESSMENT
ASSESSMENT  44M with HTN, IDDM, HLD, active smoker (1/2 PPD x 28 yrs), hepatitis B (s/p treatment) presents with CC of chest pressure and SOB.      IMPRESSION  #Acute Hypoxemic Respiratory failure  #ARDS  - CT Chest No Cont (12.28.24 @ 16:56): IMPRESSION: Findings consistent with ARDS    #Pulmonary Edema  #NSTEMI  #VTach   #Acute HFrEF  - TTE 12/27 - EF 25-30%     #LV Thrombus  #COVID-19, recent infection   #DM II  #Hx of Hep B     #Obesity BMI (kg/m2): 32.2  #Abx allergy: No Known Drug Allergies    RECOMMENDATIONS  - CXR reviewed -- overall improved   - stop cefepime/doxy after today's dose to complete 7 day course   - off steroids currently   - trend WBC -- suspect from dexamethasone     Please call or message on [employer] Teams if with any questions.  Spectra [phone]

## 2025-01-14 ENCOUNTER — RESULT REVIEW (OUTPATIENT)
Age: 45
End: 2025-01-14

## 2025-01-14 LAB
ALBUMIN SERPL ELPH-MCNC: 3.4 G/DL — LOW (ref 3.5–5.2)
ALP SERPL-CCNC: 166 U/L — HIGH (ref 30–115)
ALT FLD-CCNC: 167 U/L — HIGH (ref 0–41)
ANION GAP SERPL CALC-SCNC: 13 MMOL/L — SIGNIFICANT CHANGE UP (ref 7–14)
AST SERPL-CCNC: 111 U/L — HIGH (ref 0–41)
BILIRUB SERPL-MCNC: 0.3 MG/DL — SIGNIFICANT CHANGE UP (ref 0.2–1.2)
BUN SERPL-MCNC: 29 MG/DL — HIGH (ref 10–20)
CALCIUM SERPL-MCNC: 8.9 MG/DL — SIGNIFICANT CHANGE UP (ref 8.4–10.5)
CHLORIDE SERPL-SCNC: 98 MMOL/L — SIGNIFICANT CHANGE UP (ref 98–110)
CO2 SERPL-SCNC: 19 MMOL/L — SIGNIFICANT CHANGE UP (ref 17–32)
CREAT SERPL-MCNC: 0.9 MG/DL — SIGNIFICANT CHANGE UP (ref 0.7–1.5)
EGFR: 108 ML/MIN/1.73M2 — SIGNIFICANT CHANGE UP
GLUCOSE BLDC GLUCOMTR-MCNC: 176 MG/DL — HIGH (ref 70–99)
GLUCOSE BLDC GLUCOMTR-MCNC: 177 MG/DL — HIGH (ref 70–99)
GLUCOSE BLDC GLUCOMTR-MCNC: 197 MG/DL — HIGH (ref 70–99)
GLUCOSE BLDC GLUCOMTR-MCNC: 260 MG/DL — HIGH (ref 70–99)
GLUCOSE BLDC GLUCOMTR-MCNC: 376 MG/DL — HIGH (ref 70–99)
GLUCOSE SERPL-MCNC: 193 MG/DL — HIGH (ref 70–99)
HCT VFR BLD CALC: 35.1 % — LOW (ref 42–52)
HGB BLD-MCNC: 11.7 G/DL — LOW (ref 14–18)
MAGNESIUM SERPL-MCNC: 2.1 MG/DL — SIGNIFICANT CHANGE UP (ref 1.8–2.4)
MCHC RBC-ENTMCNC: 30.8 PG — SIGNIFICANT CHANGE UP (ref 27–31)
MCHC RBC-ENTMCNC: 33.3 G/DL — SIGNIFICANT CHANGE UP (ref 32–37)
MCV RBC AUTO: 92.4 FL — SIGNIFICANT CHANGE UP (ref 80–94)
NRBC # BLD: 0 /100 WBCS — SIGNIFICANT CHANGE UP (ref 0–0)
PLATELET # BLD AUTO: 419 K/UL — HIGH (ref 130–400)
PMV BLD: 9.7 FL — SIGNIFICANT CHANGE UP (ref 7.4–10.4)
POTASSIUM SERPL-MCNC: 5 MMOL/L — SIGNIFICANT CHANGE UP (ref 3.5–5)
POTASSIUM SERPL-SCNC: 5 MMOL/L — SIGNIFICANT CHANGE UP (ref 3.5–5)
PROT SERPL-MCNC: 6.2 G/DL — SIGNIFICANT CHANGE UP (ref 6–8)
RBC # BLD: 3.8 M/UL — LOW (ref 4.7–6.1)
RBC # FLD: 13.1 % — SIGNIFICANT CHANGE UP (ref 11.5–14.5)
SODIUM SERPL-SCNC: 130 MMOL/L — LOW (ref 135–146)
WBC # BLD: 19.87 K/UL — HIGH (ref 4.8–10.8)
WBC # FLD AUTO: 19.87 K/UL — HIGH (ref 4.8–10.8)

## 2025-01-14 PROCEDURE — 93306 TTE W/DOPPLER COMPLETE: CPT | Mod: 26

## 2025-01-14 PROCEDURE — 99233 SBSQ HOSP IP/OBS HIGH 50: CPT

## 2025-01-14 PROCEDURE — 71045 X-RAY EXAM CHEST 1 VIEW: CPT | Mod: 26

## 2025-01-14 PROCEDURE — 99233 SBSQ HOSP IP/OBS HIGH 50: CPT | Mod: 25

## 2025-01-14 RX ORDER — INSULIN LISPRO 100/ML
4 VIAL (ML) SUBCUTANEOUS ONCE
Refills: 0 | Status: COMPLETED | OUTPATIENT
Start: 2025-01-14 | End: 2025-01-14

## 2025-01-14 RX ORDER — HYDROCORTISONE 100 MG/60ML
200 ENEMA RECTAL ONCE
Refills: 0 | Status: COMPLETED | OUTPATIENT
Start: 2025-01-14 | End: 2025-01-14

## 2025-01-14 RX ORDER — ENOXAPARIN SODIUM 60 MG/.6ML
100 INJECTION INTRAVENOUS; SUBCUTANEOUS EVERY 12 HOURS
Refills: 0 | Status: DISCONTINUED | OUTPATIENT
Start: 2025-01-14 | End: 2025-01-16

## 2025-01-14 RX ADMIN — PANTOPRAZOLE 40 MILLIGRAM(S): 40 TABLET, DELAYED RELEASE ORAL at 07:57

## 2025-01-14 RX ADMIN — LOSARTAN POTASSIUM 100 MILLIGRAM(S): 100 TABLET, FILM COATED ORAL at 05:02

## 2025-01-14 RX ADMIN — NICOTINE POLACRILEX 1 PATCH: 4 LOZENGE ORAL at 18:03

## 2025-01-14 RX ADMIN — Medication 100 MILLIGRAM(S): at 13:57

## 2025-01-14 RX ADMIN — Medication 5 MILLIGRAM(S): at 21:33

## 2025-01-14 RX ADMIN — NICOTINE POLACRILEX 1 PATCH: 4 LOZENGE ORAL at 05:00

## 2025-01-14 RX ADMIN — Medication 100 MILLIGRAM(S): at 21:37

## 2025-01-14 RX ADMIN — Medication 100 MILLIGRAM(S): at 05:02

## 2025-01-14 RX ADMIN — NICOTINE POLACRILEX 1 PATCH: 4 LOZENGE ORAL at 07:55

## 2025-01-14 RX ADMIN — Medication 2: at 11:36

## 2025-01-14 RX ADMIN — Medication 81 MILLIGRAM(S): at 09:34

## 2025-01-14 RX ADMIN — CHLORHEXIDINE GLUCONATE 1 APPLICATION(S): 1.2 RINSE ORAL at 05:25

## 2025-01-14 RX ADMIN — Medication 4 UNIT(S): at 21:59

## 2025-01-14 RX ADMIN — Medication 10: at 17:02

## 2025-01-14 RX ADMIN — CLOPIDOGREL BISULFATE 75 MILLIGRAM(S): 75 TABLET, FILM COATED ORAL at 09:34

## 2025-01-14 RX ADMIN — Medication 2: at 07:55

## 2025-01-14 RX ADMIN — CETIRIZINE HYDROCHLORIDE 10 MILLIGRAM(S): 5 SYRUP ORAL at 11:35

## 2025-01-14 RX ADMIN — NICOTINE POLACRILEX 1 PATCH: 4 LOZENGE ORAL at 05:24

## 2025-01-14 RX ADMIN — Medication 100 MILLIGRAM(S): at 17:05

## 2025-01-14 RX ADMIN — HYDROCORTISONE 200 MILLIGRAM(S): 100 ENEMA RECTAL at 09:49

## 2025-01-14 RX ADMIN — Medication 100 MILLIGRAM(S): at 05:25

## 2025-01-14 RX ADMIN — Medication 10 UNIT(S): at 11:36

## 2025-01-14 RX ADMIN — Medication 10 UNIT(S): at 17:03

## 2025-01-14 RX ADMIN — INSULIN GLARGINE-YFGN 30 UNIT(S): 100 INJECTION, SOLUTION SUBCUTANEOUS at 21:32

## 2025-01-14 RX ADMIN — AMIODARONE HYDROCHLORIDE 200 MILLIGRAM(S): 200 TABLET ORAL at 05:48

## 2025-01-14 RX ADMIN — ENOXAPARIN SODIUM 100 MILLIGRAM(S): 60 INJECTION INTRAVENOUS; SUBCUTANEOUS at 17:05

## 2025-01-14 NOTE — PROGRESS NOTE ADULT - SUBJECTIVE AND OBJECTIVE BOX
SUBJECTIVE / OVERNIGHT EVENTS  Patient was seen and examined. NPO for LHC today. No overnight events. On 2L nasal canula and satting well     MEDICATIONS  aMIOdarone    Tablet 200 milliGRAM(s) Oral daily  aspirin enteric coated 81 milliGRAM(s) Oral daily  cefepime   IVPB 2000 milliGRAM(s) IV Intermittent every 8 hours  cefepime   IVPB      cetirizine 10 milliGRAM(s) Oral daily  chlorhexidine 2% Cloths 1 Application(s) Topical daily  clopidogrel Tablet 75 milliGRAM(s) Oral daily  dextrose 50% Injectable 50 milliLiter(s) IV Push every 15 minutes  doxycycline IVPB      doxycycline IVPB 100 milliGRAM(s) IV Intermittent every 12 hours  glucagon  Injectable 1 milliGRAM(s) IntraMuscular once  insulin glargine Injectable (LANTUS) 30 Unit(s) SubCutaneous at bedtime  insulin lispro (ADMELOG) corrective regimen sliding scale   SubCutaneous three times a day before meals  insulin lispro Injectable (ADMELOG) 10 Unit(s) SubCutaneous three times a day before meals  losartan 100 milliGRAM(s) Oral daily  melatonin 5 milliGRAM(s) Oral at bedtime  nicotine -  14 mG/24Hr(s) Patch 1 Patch Transdermal every 24 hours  pantoprazole  Injectable 40 milliGRAM(s) IV Push with breakfast  regadenoson Injectable 0.4 milliGRAM(s) IV Push once    acetaminophen     Tablet .. 650 milliGRAM(s) Oral every 6 hours PRN Mild Pain (1 - 3)  albuterol/ipratropium for Nebulization 3 milliLiter(s) Nebulizer every 6 hours PRN Shortness of Breath and/or Wheezing  benzocaine/menthol Lozenge 1 Lozenge Oral every 3 hours PRN Sore Throat  benzonatate 100 milliGRAM(s) Oral every 8 hours PRN Cough  guaifenesin/dextromethorphan Oral Liquid 10 milliLiter(s) Oral every 4 hours PRN Cough    VITALS /  EXAM    T(C): 36.3 (01-14-25 @ 09:37), Max: 36.7 (01-14-25 @ 00:11)  HR: 65 (01-14-25 @ 10:30) (65 - 72)  BP: 106/59 (01-14-25 @ 10:30) (99/64 - 123/68)  RR: 18 (01-14-25 @ 10:30) (15 - 20)  SpO2: 98% (01-14-25 @ 10:30) (95% - 98%)  POCT Blood Glucose.: 177 mg/dL (01-14-25 @ 10:31)  POCT Blood Glucose.: 176 mg/dL (01-14-25 @ 07:32)  POCT Blood Glucose.: 173 mg/dL (01-13-25 @ 21:17)  POCT Blood Glucose.: 152 mg/dL (01-13-25 @ 16:11)  POCT Blood Glucose.: 147 mg/dL (01-13-25 @ 11:06)    GENERAL: NAD, well-developed  CHEST/LUNG: Clear to auscultation bilaterally; No wheezes, rales or rhonchi  HEART: Regular rate and rhythm; No murmurs, rubs, or gallops  ABDOMEN: Soft, Nontender, Nondistended; Bowel sounds present, no masses.  EXTREMITIES:  2+ Peripheral Pulses, No clubbing, cyanosis, or edema    I's & O's     01-13-25 @ 07:01  -  01-14-25 @ 07:00  --------------------------------------------------------  IN:    IV PiggyBack: 350 mL    Lactated Ringers: 500 mL    Oral Fluid: 720 mL  Total IN: 1570 mL    OUT:    Voided (mL): 2050 mL  Total OUT: 2050 mL    Total NET: -480 mL      01-14-25 @ 07:01  -  01-14-25 @ 10:57  --------------------------------------------------------  IN:  Total IN: 0 mL    OUT:    Voided (mL): 400 mL  Total OUT: 400 mL    Total NET: -400 mL        LABS             11.7   19.87 )-----------( 419      ( 01-14-25 @ 06:40 )             35.1     130  |  98  |  29  -------------------------<  193   01-14-25 @ 06:40  5.0  |  19  |  0.9    Ca      8.9     01-14-25 @ 06:40  Mg     2.1     01-14-25 @ 06:40    TPro  6.2  /  Alb  3.4  /  TBili  0.3  /  DBili  x   /  AST  111  /  ALT  167  /  AlkPhos  166  /  GGT  x     01-14-25 @ 06:40                    Urinalysis Basic - ( 14 Jan 2025 06:40 )    Color: x / Appearance: x / SG: x / pH: x  Gluc: 193 mg/dL / Ketone: x  / Bili: x / Urobili: x   Blood: x / Protein: x / Nitrite: x   Leuk Esterase: x / RBC: x / WBC x   Sq Epi: x / Non Sq Epi: x / Bacteria: x      MICRO / IMAGING / CARDIOLOGY  Telemetry: Reviewed   EKG: Reviewed    CULTURES    IMAGING  PACS Image:  (01-12-25 @ 13:31)    CARDIOLOGY

## 2025-01-14 NOTE — CHART NOTE - NSCHARTNOTEFT_GEN_A_CORE
PREOPERATIVE DAY OF PROCEDURE EVALUATION:  I have personally seen and examined the patient.  I agree with the history and physical which I have reviewed and noted any changes below.  (Signed electronically by __________)  01-14-25 @ 09:39    43 YO M with PMHx of HTN, IDDM, HLD, active smoker (1/2 PPD x 28 yrs), hepatitis B (s/p treatment), who presented with c/o chest pressure and SOB, admitted for late presentation of anterolateral MI, with acute HFrEF, EF 25-30%, being diuresed. Pt also had episodes of VT on this admission, placed om Amio. And being tx with IV Abx for PNA. LHC performed and 2VCAD found. Patient now presents today for staged intervention to LAD after being medically optimized.       # LV thrombus - on therapeutic Lovx, last dose on 1/13 @ 5p  -ASA/Plavix given precath  -shellfish allergy, Solucortef 200mg IVP given precath, will give 50mg IV benadryl on the table     Pre cath note:  indication:  [ ] STEMI                [ ] NSTEMI                 [ ] Acute coronary syndrome                   [ ]Unstable Angina   [ ] high risk  [ ] intermediate risk  [ ] low risk                   [ ] Stable Angina     non-invasive testing:                          Date:                     result: [ ] high risk  [ ] intermediate risk  [ ] low risk    Anti- Anginal medications:                    [ ] not used d/t                     [ ] used   ( ) BB     ( ) CCB      ( ) Nitrate   (  ) Ranexa          [ ] not used but strong indication not to use    Ejection Fraction                   [ ] <29            [ ] 30-39%   [ ] 40-49%     [ ]>50%    CHF          [ ] active (within last 14 days on meds   [ ] Chronic (on meds but no exacerbation)  NYHA Functional Class:  (  ) Class I (no limitations)  (  ) Class II (slight limitation)  (  ) Class III (marked limitation)  (  ) Class IV (symptoms at rest)    COPD                   [ ] mild (on chronic bronchodilators)  [ ] moderate (on chronic steroid therapy)      [ ] severe (indication for home O2 or PACO2 >50)    Other risk factors:                     [ ] Previous MI                     [ ] CVA/ stroke                    [ ] carotid stent/ CEA                    [ ] PVD/PAD- (arterial aneurysm, non-palpable pulses, tortuous vessel with inability to insert catheter, infra-renal dissection, renal or subclavian artery stenosis)                    [ ] previous CABG                    [ ] Renal Failure:  on HD  (  ) yes  (  ) no                    [ x] Diabetic  (  ) Type 1  (  ) Type 2                                         ( x ) Insulin dependent  (  ) non-insulin dependent                                         (  ) Metformin  (  ) Januvia  (  ) Glimepiride  (  ) Glipizide  (  ) Glyburide  (  ) Actos                                         (  ) GLP-1 receptor agonists (Ozempic, Mounjaro, Wegovy, trulicity, Byetta, Victoza)                                         (  ) SGLT2 Inhibitors (Farxiga, Jardiance, Invokana)                                         (  ) Other                            11.7   19.87 )-----------( 419      ( 14 Jan 2025 06:40 )             35.1     01-14    130[L]  |  98  |  29[H]  ----------------------------<  193[H]  5.0   |  19  |  0.9    Ca    8.9      14 Jan 2025 06:40  Mg     2.1     01-14    TPro  6.2  /  Alb  3.4[L]  /  TBili  0.3  /  DBili  x   /  AST  111[H]  /  ALT  167[H]  /  AlkPhos  166[H]  01-14      Bleeding Risk: 0.9%    Pre-cath Hydration: (  )  cc IV bolus x 1 over 1 hr followed by:  (  ) NS @ 75cc/hr until procedure (up to 2 hrs) if EF> 50%                                                                                                                         (  ) NS @ 50cc/hr until procedure (up to 2 hrs) if EF< 50%                                      ( x ) No precath hydration d/t low EF 25-30%    RIGHT RADIAL ARTERY EVALUATION:  GARRETT TEST: [] Negative          [x] Positive    EF: 25-30%  Date:     EKG:   Date:    VS: 122/71 66bpm 95% on RA

## 2025-01-14 NOTE — PROGRESS NOTE ADULT - ASSESSMENT
45 YO M with PMHx of HTN, IDDM, HLD, active smoker (1/2 PPD x 28 yrs), hepatitis B (s/p treatment) presented with CC chest pressure and SOB. Patient was admitted for late presentation of anterolateral MI pedning [facility] after respiratory status optimization.     # Possible ARDS on admission   # Late presentation anterior MI   # Acute HFrEF, ICM, EF 25-30%  # Sustained monomorphic slow Vtach; 12 min overnight on 12/28 with rate ~ 140s  # LV thrombus  # HTN  - Satting well on 2L nasal canula. Not in distress  - Resumed ASA 81 QD   - On plavix 75mg qd, Lovenox 100mg q12  - Increased Losartan to 100mg PO QD yesterday  - Hold off BB (Low CO; CI 1.67)  - s/p lidocaine  and s/p amio drip for vtach , now on PO , c/w amiodarone 400 mg BID PO. today day 14  - Viability study showed nonviable myocardium  - EP following for VT  - Procal 0.22   - MRSA swab > Negative   - Pulm consulted; minimal pleural effusion ;  On IV steroids ARDS dosing  - Euvolemic as of today; no need of IVF/diuresis   - ID consult appreciated  - : steroids started ARDS dose ; Decadron 20 mg IV>>changed to 10 mg then off due to hyperglycemia  - Monitor intake and output   - Steroids discontinued yesterday; patient has clear lung sounds, decreasing oxygen requirements; low threshold to restart incase deteriorates  - Keep K > 4.0 and Mag > 2.0.   - Based on the patient's IVC, will give 500 cc LR. Collapsable  - NPO for C  - Echo after [facility] as per EP to see if improvement in EF. if EF>35 % no lifevest, would place ILR      #HLD  #IDDM w/ hyperglycemia  -TSH 0.99  -A1c 10.8 - DM control  -  -Hyperglycemia was worsening due to steroids. (see above)  -adjust insulin reg to keep -180  -On Lantus 30 and Lispro 10 TID    DVT PPX: Lovenox  Diet: DASH CC with fluid restriction  full code   43 YO M with PMHx of HTN, IDDM, HLD, active smoker (1/2 PPD x 28 yrs), hepatitis B (s/p treatment) presented with CC chest pressure and SOB. Patient was admitted for late presentation of anterolateral MI pedning [facility] after respiratory status optimization.     # Possible ARDS on admission   # Late presentation anterior MI   # Acute HFrEF, ICM, EF 25-30%  # Sustained monomorphic slow Vtach; 12 min overnight on 12/28 with rate ~ 140s  # LV thrombus  # HTN  - Satting well on 2L nasal canula. Not in distress  - Resumed ASA 81 QD   - On plavix 75mg qd, Lovenox 100mg q12  - on Losartan to 100mg PO QD   - Hold off BB (Low CO; CI 1.67)  - s/p lidocaine  and s/p amio drip for vtach , now on PO , c/w amiodaron 200 mg daily  - Viability study showed nonviable myocardium  - EP following for VT  - Procal 0.22   - MRSA swab > Negative   - Pulm consulted; minimal pleural effusion ;  off IV steroids ARDS dosing. Steroids discontinued ; patient has clear lung sounds, decreasing oxygen requirements; low threshold to restart incase deteriorates  - Euvolemic as of today; no need of IVF/diuresis   - ID consult appreciated  - : steroids started ARDS dose ; Decadron 20 mg IV>>changed to 10 mg then off due to hyperglycemia  - Keep K > 4.0 and Mag > 2.0.   - NPO for C  - Echo after [facility] as per EP to see if improvement in EF. if EF>35 % no lifevest, would place ILR      #HLD  #IDDM w/ hyperglycemia  -TSH 0.99  -A1c 10.8 - DM control  -  -Hyperglycemia was worsening due to steroids. (see above)  -adjust insulin reg to keep -180  -On Lantus 30 and Lispro 10 TID    DVT PPX: Lovenox  Diet: DASH CC with fluid restriction  full code

## 2025-01-15 ENCOUNTER — TRANSCRIPTION ENCOUNTER (OUTPATIENT)
Age: 45
End: 2025-01-15

## 2025-01-15 LAB
ALBUMIN SERPL ELPH-MCNC: 3.5 G/DL — SIGNIFICANT CHANGE UP (ref 3.5–5.2)
ALP SERPL-CCNC: 184 U/L — HIGH (ref 30–115)
ALT FLD-CCNC: 147 U/L — HIGH (ref 0–41)
ANION GAP SERPL CALC-SCNC: 15 MMOL/L — HIGH (ref 7–14)
AST SERPL-CCNC: 80 U/L — HIGH (ref 0–41)
BILIRUB SERPL-MCNC: 0.2 MG/DL — SIGNIFICANT CHANGE UP (ref 0.2–1.2)
BUN SERPL-MCNC: 31 MG/DL — HIGH (ref 10–20)
CALCIUM SERPL-MCNC: 8.6 MG/DL — SIGNIFICANT CHANGE UP (ref 8.4–10.5)
CHLORIDE SERPL-SCNC: 98 MMOL/L — SIGNIFICANT CHANGE UP (ref 98–110)
CO2 SERPL-SCNC: 18 MMOL/L — SIGNIFICANT CHANGE UP (ref 17–32)
CREAT SERPL-MCNC: 1 MG/DL — SIGNIFICANT CHANGE UP (ref 0.7–1.5)
EGFR: 95 ML/MIN/1.73M2 — SIGNIFICANT CHANGE UP
GLUCOSE BLDC GLUCOMTR-MCNC: 136 MG/DL — HIGH (ref 70–99)
GLUCOSE BLDC GLUCOMTR-MCNC: 140 MG/DL — HIGH (ref 70–99)
GLUCOSE BLDC GLUCOMTR-MCNC: 173 MG/DL — HIGH (ref 70–99)
GLUCOSE BLDC GLUCOMTR-MCNC: 189 MG/DL — HIGH (ref 70–99)
GLUCOSE SERPL-MCNC: 182 MG/DL — HIGH (ref 70–99)
HCT VFR BLD CALC: 35.4 % — LOW (ref 42–52)
HGB BLD-MCNC: 11.7 G/DL — LOW (ref 14–18)
MAGNESIUM SERPL-MCNC: 2.2 MG/DL — SIGNIFICANT CHANGE UP (ref 1.8–2.4)
MCHC RBC-ENTMCNC: 30.8 PG — SIGNIFICANT CHANGE UP (ref 27–31)
MCHC RBC-ENTMCNC: 33.1 G/DL — SIGNIFICANT CHANGE UP (ref 32–37)
MCV RBC AUTO: 93.2 FL — SIGNIFICANT CHANGE UP (ref 80–94)
NRBC # BLD: 0 /100 WBCS — SIGNIFICANT CHANGE UP (ref 0–0)
PLATELET # BLD AUTO: 437 K/UL — HIGH (ref 130–400)
PMV BLD: 9.8 FL — SIGNIFICANT CHANGE UP (ref 7.4–10.4)
POTASSIUM SERPL-MCNC: 5 MMOL/L — SIGNIFICANT CHANGE UP (ref 3.5–5)
POTASSIUM SERPL-SCNC: 5 MMOL/L — SIGNIFICANT CHANGE UP (ref 3.5–5)
PROT SERPL-MCNC: 6.1 G/DL — SIGNIFICANT CHANGE UP (ref 6–8)
RBC # BLD: 3.8 M/UL — LOW (ref 4.7–6.1)
RBC # FLD: 13.5 % — SIGNIFICANT CHANGE UP (ref 11.5–14.5)
SODIUM SERPL-SCNC: 131 MMOL/L — LOW (ref 135–146)
WBC # BLD: 25.03 K/UL — HIGH (ref 4.8–10.8)
WBC # FLD AUTO: 25.03 K/UL — HIGH (ref 4.8–10.8)

## 2025-01-15 PROCEDURE — 99239 HOSP IP/OBS DSCHRG MGMT >30: CPT

## 2025-01-15 PROCEDURE — 71045 X-RAY EXAM CHEST 1 VIEW: CPT | Mod: 26

## 2025-01-15 RX ORDER — NICOTINE POLACRILEX 4 MG/1
1 LOZENGE ORAL
Qty: 2 | Refills: 2
Start: 2025-01-15 | End: 2025-04-14

## 2025-01-15 RX ORDER — INSULIN LISPRO 100/ML
40 VIAL (ML) SUBCUTANEOUS
Refills: 0 | DISCHARGE

## 2025-01-15 RX ORDER — INSULIN GLARGINE-YFGN 100 [IU]/ML
30 INJECTION, SOLUTION SUBCUTANEOUS
Qty: 15 | Refills: 1
Start: 2025-01-15 | End: 2025-03-15

## 2025-01-15 RX ORDER — APIXABAN 5 MG/1
1 TABLET, FILM COATED ORAL
Qty: 60 | Refills: 2
Start: 2025-01-15 | End: 2025-04-14

## 2025-01-15 RX ORDER — INSULIN GLARGINE-YFGN 100 [IU]/ML
85 INJECTION, SOLUTION SUBCUTANEOUS
Refills: 0 | DISCHARGE

## 2025-01-15 RX ORDER — CLOPIDOGREL BISULFATE 75 MG/1
1 TABLET, FILM COATED ORAL
Qty: 30 | Refills: 2
Start: 2025-01-15 | End: 2025-04-14

## 2025-01-15 RX ORDER — INSULIN GLARGINE-YFGN 100 [IU]/ML
30 INJECTION, SOLUTION SUBCUTANEOUS
Qty: 1 | Refills: 2
Start: 2025-01-15 | End: 2025-04-14

## 2025-01-15 RX ORDER — AMIODARONE HYDROCHLORIDE 200 MG/1
1 TABLET ORAL
Qty: 30 | Refills: 2
Start: 2025-01-15 | End: 2025-04-14

## 2025-01-15 RX ORDER — INSULIN LISPRO 100/ML
10 VIAL (ML) SUBCUTANEOUS
Qty: 1 | Refills: 2
Start: 2025-01-15 | End: 2025-04-14

## 2025-01-15 RX ORDER — LOSARTAN POTASSIUM 100 MG/1
1 TABLET, FILM COATED ORAL
Refills: 0 | DISCHARGE

## 2025-01-15 RX ORDER — LOSARTAN POTASSIUM 100 MG/1
1 TABLET, FILM COATED ORAL
Qty: 30 | Refills: 2
Start: 2025-01-15 | End: 2025-04-14

## 2025-01-15 RX ADMIN — Medication 2: at 17:26

## 2025-01-15 RX ADMIN — ENOXAPARIN SODIUM 100 MILLIGRAM(S): 60 INJECTION INTRAVENOUS; SUBCUTANEOUS at 04:56

## 2025-01-15 RX ADMIN — ACETAMINOPHEN 650 MILLIGRAM(S): 80 SOLUTION/ DROPS ORAL at 02:48

## 2025-01-15 RX ADMIN — Medication 10 UNIT(S): at 08:27

## 2025-01-15 RX ADMIN — Medication 100 MILLIGRAM(S): at 04:56

## 2025-01-15 RX ADMIN — AMIODARONE HYDROCHLORIDE 200 MILLIGRAM(S): 200 TABLET ORAL at 05:14

## 2025-01-15 RX ADMIN — Medication 100 MILLIGRAM(S): at 05:14

## 2025-01-15 RX ADMIN — Medication 10 UNIT(S): at 17:25

## 2025-01-15 RX ADMIN — CHLORHEXIDINE GLUCONATE 1 APPLICATION(S): 1.2 RINSE ORAL at 05:12

## 2025-01-15 RX ADMIN — ENOXAPARIN SODIUM 100 MILLIGRAM(S): 60 INJECTION INTRAVENOUS; SUBCUTANEOUS at 17:26

## 2025-01-15 RX ADMIN — CETIRIZINE HYDROCHLORIDE 10 MILLIGRAM(S): 5 SYRUP ORAL at 11:45

## 2025-01-15 RX ADMIN — NICOTINE POLACRILEX 1 PATCH: 4 LOZENGE ORAL at 19:30

## 2025-01-15 RX ADMIN — NICOTINE POLACRILEX 1 PATCH: 4 LOZENGE ORAL at 05:13

## 2025-01-15 RX ADMIN — INSULIN GLARGINE-YFGN 30 UNIT(S): 100 INJECTION, SOLUTION SUBCUTANEOUS at 21:30

## 2025-01-15 RX ADMIN — Medication 5 MILLIGRAM(S): at 21:30

## 2025-01-15 RX ADMIN — ACETAMINOPHEN 650 MILLIGRAM(S): 80 SOLUTION/ DROPS ORAL at 04:43

## 2025-01-15 RX ADMIN — Medication 2: at 08:26

## 2025-01-15 RX ADMIN — PANTOPRAZOLE 40 MILLIGRAM(S): 40 TABLET, DELAYED RELEASE ORAL at 08:28

## 2025-01-15 RX ADMIN — LOSARTAN POTASSIUM 100 MILLIGRAM(S): 100 TABLET, FILM COATED ORAL at 04:57

## 2025-01-15 RX ADMIN — Medication 10 UNIT(S): at 11:46

## 2025-01-15 RX ADMIN — CLOPIDOGREL BISULFATE 75 MILLIGRAM(S): 75 TABLET, FILM COATED ORAL at 11:45

## 2025-01-15 NOTE — PROGRESS NOTE ADULT - SUBJECTIVE AND OBJECTIVE BOX
Interventional Cardiology/Advanced Heart Failure Transplant Attending Progress Note    SARAH FARMER  MRN-[id number]    Date of Admission: 12-26-24    24 hr events: NAEON    acetaminophen     Tablet .. 650 milliGRAM(s) Oral every 6 hours PRN  albuterol/ipratropium for Nebulization 3 milliLiter(s) Nebulizer every 6 hours PRN  aMIOdarone    Tablet 200 milliGRAM(s) Oral daily  aspirin enteric coated 81 milliGRAM(s) Oral daily  benzocaine/menthol Lozenge 1 Lozenge Oral every 3 hours PRN  benzonatate 100 milliGRAM(s) Oral every 8 hours PRN  cetirizine 10 milliGRAM(s) Oral daily  chlorhexidine 2% Cloths 1 Application(s) Topical daily  clopidogrel Tablet 75 milliGRAM(s) Oral daily  dextrose 50% Injectable 50 milliLiter(s) IV Push every 15 minutes  enoxaparin Injectable 100 milliGRAM(s) SubCutaneous every 12 hours  glucagon  Injectable 1 milliGRAM(s) IntraMuscular once  guaifenesin/dextromethorphan Oral Liquid 10 milliLiter(s) Oral every 4 hours PRN  insulin glargine Injectable (LANTUS) 30 Unit(s) SubCutaneous at bedtime  insulin lispro (ADMELOG) corrective regimen sliding scale   SubCutaneous three times a day before meals  insulin lispro Injectable (ADMELOG) 10 Unit(s) SubCutaneous three times a day before meals  losartan 100 milliGRAM(s) Oral daily  melatonin 5 milliGRAM(s) Oral at bedtime  nicotine -  14 mG/24Hr(s) Patch 1 Patch Transdermal every 24 hours  pantoprazole  Injectable 40 milliGRAM(s) IV Push with breakfast  regadenoson Injectable 0.4 milliGRAM(s) IV Push once      PHYSICAL EXAM:  Vitals reviewed    General Appearance: no acute distress  Cardiovascular: regular rate and rhythm S1 S2, No JVD, No murmurs  Respiratory: Lungs clear to auscultation	  Gastrointestinal:  Soft, Non-tender  Skin/Integumen: Warm	  Neurologic: Non-focal  Musculoskeletal/ extremities: No edema  Vascular: Peripheral pulses palpable 2+ bilaterally    LABS: Reviewed    CARDIAC TESTING:  No new imaging

## 2025-01-15 NOTE — DISCHARGE NOTE PROVIDER - NSDCMRMEDTOKEN_GEN_ALL_CORE_FT
atorvastatin 40 mg oral tablet: 1 tab(s) orally once a day (at bedtime)  Insulin Glargine: 85 unit(s) subcutaneous once a day  Insulin Lispro: 40 unit(s) subcutaneous once a day  Jardiance 25 mg oral tablet: 1 tab(s) orally once a day  losartan 25 mg oral tablet: 1 tab(s) orally once a day  metFORMIN 1000 mg oral tablet: 1 tab(s) orally 2 times a day  Ozempic 2 mg/1.5 mL (1 mg dose) subcutaneous solution: 2 milligram(s) subcutaneous once a week   atorvastatin 40 mg oral tablet: 1 tab(s) orally once a day (at bedtime)  clopidogrel 75 mg oral tablet: 1 tab(s) orally once a day  Cozaar 100 mg oral tablet: 1 tab(s) orally once a day  Eliquis 5 mg oral tablet: 1 tab(s) orally 2 times a day  HumaLOG 100 units/mL injectable solution: 10 unit(s) injectable 3 times a day before meals  Insulin Pen Needles, 4mm: 1 application subcutaneously 4 times a day. ** Use with insulin pen **  Jardiance 25 mg oral tablet: 1 tab(s) orally once a day  Lantus Solostar Pen 100 units/mL subcutaneous solution: 30 unit(s) subcutaneous once a day (at bedtime)  metFORMIN 1000 mg oral tablet: 1 tab(s) orally 2 times a day  Nicoderm C-Q Clear 14 mg/24 hr transdermal film, extended release: 1 patch transdermally once a day  Ozempic 2 mg/1.5 mL (1 mg dose) subcutaneous solution: 2 milligram(s) subcutaneous once a week  Pacerone 200 mg oral tablet: 1 tab(s) orally once a day

## 2025-01-15 NOTE — DISCHARGE NOTE PROVIDER - PROVIDER TOKENS
PROVIDER:[TOKEN:[[id number]],FOLLOWUP:[2 weeks]] PROVIDER:[TOKEN:[[id number]],FOLLOWUP:[2 weeks]],PROVIDER:[TOKEN:[[id number]],FOLLOWUP:[2 weeks]]

## 2025-01-15 NOTE — PROGRESS NOTE ADULT - NS ATTEST RISK PROBLEM GEN_ALL_CORE FT
Face-to-face encounter, review of extensive medical records in this and prior charts, laboratory findings, radiographic and imaging/diagnostic results; documentation as noted above and discussion of diagnostic impressions and plan with the patient and team.
Ischemic CM  Sustained VT  S/p MI  COVID PNA, hypoxia
Face-to-face encounter, review of extensive medical records in this and prior charts, laboratory findings, radiographic and imaging/diagnostic results; documentation as noted above and discussion of diagnostic impressions and plan with the patient and team.    Titration of vasoactive heart failure medications. High risk of decompensation
Sustained VT  COVID PNA  Severe ischemic CM  S/p Anterior wall MI

## 2025-01-15 NOTE — DISCHARGE NOTE PROVIDER - CARE PROVIDER_API CALL
Eduardo Jewell  Interventional Cardiology  [address] [place] [address], [address]  [place] [address]  Phone: [phone]  Fax: [phone]  Follow Up Time: 2 weeks   Eduardo Jewell  Interventional Cardiology  [address] [place] [address], [address]  [place] [address]  Phone: [phone]  Fax: [phone]  Follow Up Time: 2 weeks    Janet Marley  Cardiac Electrophysiology  [address] [place] [address]  Phone: [phone]  Fax: [phone]  Follow Up Time: 2 weeks

## 2025-01-15 NOTE — PROGRESS NOTE ADULT - ASSESSMENT
44-year-old male past medical history hypertension, hyperlipidemia, diabetes, active smoker presents with dyspnea and chest pain.  Patient with recent COVID infection.  Patient found to have an NSTEMI and echo with severely reduced LV systolic function.  WMA in significant LAD territory. LHC/RHC demonstrated severe stenosis of mid LAD, likely culprit and disease of OM1.     Respiratory status now improved and off oxygen. However, this is an old LAD infarct and based on OAT trial, medical management would be appropriate and can plan for outpatient revascularization of OM. On exam he is warm and well-perfused.  Recommend optimizing medical therapy and plan for outpatient revascularization in 2-4 weeks.    Eduardo Jewell MD  Interventional Cardiology/Advanced Heart Failure Transplant

## 2025-01-15 NOTE — DISCHARGE NOTE PROVIDER - CARE PROVIDERS DIRECT ADDRESSES
[email or web address]

## 2025-01-15 NOTE — DISCHARGE NOTE PROVIDER - HOSPITAL COURSE
3 YO M with PMHx of HTN, IDDM, HLD, active smoker (1/2 PPD x 28 yrs), hepatitis B (s/p treatment) presented with CC chest pressure and SOB. [facility]     # Possible ARDS on admission   # Late presentation anterior MI   # Acute HFrEF, ICM, EF 25-30%  # Sustained monomorphic slow Vtach; 12 min overnight on 12/28 with rate ~ 140s  # LV thrombus  # HTN  - Satting well on room airl. Not in distress  - Was on ASA 81 mg inpatient. Won't continue on discharge  - On plavix 75mg qd, . continue on discharge  - On Lovenox 100mg q12. Will switch to Apixaban 5 mg on discharge  - on Losartan to 100mg PO QD. Continue on discharge  - Hold off BB (Low CO; CI 1.67)  - s/p lidocaine  and s/p amio drip for vtach , now on PO , c/w amiodaron 200 mg daily. Continue on discharge  - Viability study showed nonviable myocardium  - Pulm consulted; minimal pleural effusion ;  off IV steroids ARDS dosing. Steroids discontinued ; patient has clear lung sounds, decreasing oxygen requirements until stable on room air  - Euvolemic as of today; no need of IVF/diuresis   - Old LAD infarct and based on OAT trial, medical management would be appropriate and can plan for outpatient revascularization of OM. On exam he is warm and well-perfused.  Recommend optimizing medical therapy and plan for outpatient revascularization in 2-4 weeks.  - Patient will be discharged on Lifevest due to low EF        #HLD  #IDDM w/ hyperglycemia  -TSH 0.99  -A1c 10.8 - DM control  -  -Hyperglycemia was worsening due to steroids. (see above)  -adjust insulin reg to keep -180  -On Lantus 30 and Lispro 10 TID. Continue as outpatient   43 YO M with PMHx of HTN, IDDM, HLD, active smoker (1/2 PPD x 28 yrs), hepatitis B (s/p treatment) presented with CC chest pressure and SOB. Pt had a recent Covid infection, as well as found to have reduction in EF. For covid/ ARDS, he was managed with  which was managed with IV steroids which caused severe hyperglycemia prompting upgrade to CCU. [facility] 2 vessel dz w severe stenosis of mid LAD, likely culprit and disease of OM1. Pt was managed medically and will be discarged with a life vest as per EP with plan for revascularization in 2-4 weeks.     # Possible ARDS on admission   # Late presentation anterior MI   # Acute HFrEF, ICM, EF 25-30%  # Sustained monomorphic slow Vtach; 12 min overnight on 12/28 with rate ~ 140s  # LV thrombus  # HTN  - Satting well on room airl. Not in distress  - Was on ASA 81 mg inpatient. Won't continue on discharge  - On plavix 75mg qd, . continue on discharge  - On Lovenox 100mg q12. Will switch to Apixaban 5 mg on discharge  - on Losartan to 100mg PO QD. Continue on discharge  - Hold off BB (Low CO; CI 1.67)  - s/p lidocaine  and s/p amio drip for vtach , now on PO , c/w amiodaron 200 mg daily. Continue on discharge  - Viability study showed nonviable myocardium  - Pulm consulted; minimal pleural effusion ;  off IV steroids ARDS dosing. Steroids discontinued ; patient has clear lung sounds, decreasing oxygen requirements until stable on room air  - Euvolemic as of today; no need of IVF/diuresis   - Old LAD infarct and based on OAT trial, medical management would be appropriate and can plan for outpatient revascularization of OM. On exam he is warm and well-perfused.  Recommend optimizing medical therapy and plan for outpatient revascularization in 2-4 weeks.  - Patient will be discharged on Lifevest due to low EF        #HLD  #IDDM w/ hyperglycemia  -TSH 0.99  -A1c 10.8 - DM control  -  -Hyperglycemia was worsening due to steroids. (see above)  -adjust insulin reg to keep -180  -On Lantus 30 and Lispro 10 TID. Continue as outpatient

## 2025-01-15 NOTE — DISCHARGE NOTE PROVIDER - NSDCCPCAREPLAN_GEN_ALL_CORE_FT
PRINCIPAL DISCHARGE DIAGNOSIS  Diagnosis: Anterolateral myocardial infarction  Assessment and Plan of Treatment: You were admitted due to acute decompensated heart failure due a previous myocardial infarction that wasn"t worked-up or managed. You underwent diuretic therapy to clear out the fluids from your lungs. You aslo underwent a left heart catheter that showd occulsion in 2 vessels in your heart. No stent was placed during your in-hospital stay, as it will be done as outpatient to give chance for medical therapy. Please continue taking Plavix, Apixaban, losartan, atorvastatin as persribed and follow up with Dr. Francis Kumar from interventional cardiology after 2-4 weeks.      SECONDARY DISCHARGE DIAGNOSES  Diagnosis: Ventricular tachycardia  Assessment and Plan of Treatment: You developed ventricular tachycardia during your hospital stay. You were start on IV amiodarone that was later switched to an oral formulation. A Lifevest was ordered for you to be worn at all times until your follow up appointments with cardiology. Please continue taking amiodarone as perscrbied.    Diagnosis: Left ventricular thrombus  Assessment and Plan of Treatment: On your echocardiography, a left ventricular thrombus was found. You were start on anticoagulation to prevent an embolization of the thrombus that can cause stroke. Please take Eliquis 5 mg twice a day as perscribed and follow up with your cardiologist as an outpatient     PRINCIPAL DISCHARGE DIAGNOSIS  Diagnosis: Anterolateral myocardial infarction  Assessment and Plan of Treatment: You were admitted due to acute decompensated heart failure due a previous myocardial infarction that wasn"t worked-up or managed. You underwent diuretic therapy to clear out the fluids from your lungs. You aslo underwent a left heart catheter that showd occulsion in 2 vessels in your heart. No stent was placed during your in-hospital stay, as it will be done as outpatient to give chance for medical therapy. Please continue taking Plavix, Apixaban, losartan, atorvastatin as persribed and follow up with Dr. Francis Kumar from interventional cardiology after 2-4 weeks.      SECONDARY DISCHARGE DIAGNOSES  Diagnosis: Ventricular tachycardia  Assessment and Plan of Treatment: You developed ventricular tachycardia during your hospital stay. You were start on IV amiodarone that was later switched to an oral formulation. A Lifevest was ordered for you to be worn at all times until your follow up appointments with cardiology. Please continue taking amiodarone as perscrbied.    Diagnosis: Left ventricular thrombus  Assessment and Plan of Treatment: On your echocardiography, a left ventricular thrombus was found. You were start on anticoagulation to prevent an embolization of the thrombus that can cause stroke. Please take Eliquis 5 mg twice a day as perscribed and follow up with your cardiologist as an outpatient    Diagnosis: Diabetes mellitus  Assessment and Plan of Treatment: You were discharged on 30 Units glargine at night and 10 units humalog to be taken before meals. Please follow up with your primary care doctor as an outpatient and continue taking your other outpatient oral diabetes meds

## 2025-01-16 ENCOUNTER — TRANSCRIPTION ENCOUNTER (OUTPATIENT)
Age: 45
End: 2025-01-16

## 2025-01-16 VITALS
OXYGEN SATURATION: 95 % | SYSTOLIC BLOOD PRESSURE: 106 MMHG | WEIGHT: 205.47 LBS | HEART RATE: 70 BPM | RESPIRATION RATE: 20 BRPM | TEMPERATURE: 98 F | DIASTOLIC BLOOD PRESSURE: 63 MMHG

## 2025-01-16 LAB — GLUCOSE BLDC GLUCOMTR-MCNC: 184 MG/DL — HIGH (ref 70–99)

## 2025-01-16 PROCEDURE — 99238 HOSP IP/OBS DSCHRG MGMT 30/<: CPT

## 2025-01-16 RX ADMIN — NICOTINE POLACRILEX 1 PATCH: 4 LOZENGE ORAL at 05:17

## 2025-01-16 RX ADMIN — Medication 2: at 07:52

## 2025-01-16 RX ADMIN — CHLORHEXIDINE GLUCONATE 1 APPLICATION(S): 1.2 RINSE ORAL at 06:06

## 2025-01-16 RX ADMIN — Medication 10 UNIT(S): at 07:52

## 2025-01-16 RX ADMIN — ACETAMINOPHEN 650 MILLIGRAM(S): 80 SOLUTION/ DROPS ORAL at 00:38

## 2025-01-16 RX ADMIN — NICOTINE POLACRILEX 1 PATCH: 4 LOZENGE ORAL at 07:48

## 2025-01-16 RX ADMIN — AMIODARONE HYDROCHLORIDE 200 MILLIGRAM(S): 200 TABLET ORAL at 05:13

## 2025-01-16 RX ADMIN — NICOTINE POLACRILEX 1 PATCH: 4 LOZENGE ORAL at 06:05

## 2025-01-16 RX ADMIN — LOSARTAN POTASSIUM 100 MILLIGRAM(S): 100 TABLET, FILM COATED ORAL at 05:13

## 2025-01-16 RX ADMIN — ENOXAPARIN SODIUM 100 MILLIGRAM(S): 60 INJECTION INTRAVENOUS; SUBCUTANEOUS at 05:13

## 2025-01-16 RX ADMIN — ACETAMINOPHEN 650 MILLIGRAM(S): 80 SOLUTION/ DROPS ORAL at 01:38

## 2025-01-16 NOTE — DISCHARGE NOTE NURSING/CASE MANAGEMENT/SOCIAL WORK - FINANCIAL ASSISTANCE
[facility] provides services at a reduced cost to those who are determined to be eligible through [facility]’s financial assistance program. Information regarding [facility]’s financial assistance program can be found by going to [email or web address] or by calling [phone].

## 2025-01-16 NOTE — DISCHARGE NOTE NURSING/CASE MANAGEMENT/SOCIAL WORK - PATIENT PORTAL LINK FT
You can access the FollowMyHealth Patient Portal offered by [facility] by registering at the following website: [email or web address]. By joining [employer]’s FollowMyHealth portal, you will also be able to view your health information using other applications (apps) compatible with our system.

## 2025-01-17 PROBLEM — Z00.00 ENCOUNTER FOR PREVENTIVE HEALTH EXAMINATION: Status: ACTIVE | Noted: 2025-01-17

## 2025-01-21 DIAGNOSIS — F17.210 NICOTINE DEPENDENCE, CIGARETTES, UNCOMPLICATED: ICD-10-CM

## 2025-01-21 DIAGNOSIS — I21.09 ST ELEVATION (STEMI) MYOCARDIAL INFARCTION INVOLVING OTHER CORONARY ARTERY OF ANTERIOR WALL: ICD-10-CM

## 2025-01-21 DIAGNOSIS — Z79.84 LONG TERM (CURRENT) USE OF ORAL HYPOGLYCEMIC DRUGS: ICD-10-CM

## 2025-01-21 DIAGNOSIS — I47.20 VENTRICULAR TACHYCARDIA, UNSPECIFIED: ICD-10-CM

## 2025-01-21 DIAGNOSIS — I50.23 ACUTE ON CHRONIC SYSTOLIC (CONGESTIVE) HEART FAILURE: ICD-10-CM

## 2025-01-21 DIAGNOSIS — E78.5 HYPERLIPIDEMIA, UNSPECIFIED: ICD-10-CM

## 2025-01-21 DIAGNOSIS — U07.1 COVID-19: ICD-10-CM

## 2025-01-21 DIAGNOSIS — E66.9 OBESITY, UNSPECIFIED: ICD-10-CM

## 2025-01-21 DIAGNOSIS — I47.10 SUPRAVENTRICULAR TACHYCARDIA, UNSPECIFIED: ICD-10-CM

## 2025-01-21 DIAGNOSIS — J96.01 ACUTE RESPIRATORY FAILURE WITH HYPOXIA: ICD-10-CM

## 2025-01-21 DIAGNOSIS — E11.65 TYPE 2 DIABETES MELLITUS WITH HYPERGLYCEMIA: ICD-10-CM

## 2025-01-21 DIAGNOSIS — I95.81 POSTPROCEDURAL HYPOTENSION: ICD-10-CM

## 2025-01-21 DIAGNOSIS — J12.82 PNEUMONIA DUE TO CORONAVIRUS DISEASE 2019: ICD-10-CM

## 2025-01-21 DIAGNOSIS — Z79.4 LONG TERM (CURRENT) USE OF INSULIN: ICD-10-CM

## 2025-01-21 DIAGNOSIS — I11.0 HYPERTENSIVE HEART DISEASE WITH HEART FAILURE: ICD-10-CM

## 2025-01-21 DIAGNOSIS — I51.3 INTRACARDIAC THROMBOSIS, NOT ELSEWHERE CLASSIFIED: ICD-10-CM

## 2025-01-21 DIAGNOSIS — I25.5 ISCHEMIC CARDIOMYOPATHY: ICD-10-CM

## 2025-01-22 RX ORDER — ATORVASTATIN CALCIUM 40 MG/1
1 TABLET, FILM COATED ORAL
Refills: 0 | DISCHARGE

## 2025-01-22 RX ORDER — LORATADINE 5 MG/5ML
1 SOLUTION ORAL
Refills: 0 | DISCHARGE

## 2025-01-22 RX ORDER — EMPAGLIFLOZIN 10 MG/1
1 TABLET, FILM COATED ORAL
Refills: 0 | DISCHARGE

## 2025-01-22 RX ORDER — METFORMIN 850 MG/1
1 TABLET ORAL
Refills: 0 | DISCHARGE

## 2025-01-22 RX ORDER — ORAL SEMAGLUTIDE 3 MG/1
2 TABLET ORAL
Refills: 0 | DISCHARGE

## 2025-01-22 RX ORDER — ORAL SEMAGLUTIDE 3 MG/1
0.5 TABLET ORAL
Refills: 0 | DISCHARGE

## 2025-01-22 NOTE — CHART NOTE - NSCHARTNOTEFT_GEN_A_CORE
Post-Discharge Medication Review: Completed	  	  Patient's preferred pharmacy was updated in OMR: [facility] in [place]	  	  Patient contacted to offer medication counseling post-discharge. Medication reconciliation completed. Per patient, medications include:	  	  1.	atorvastatin 40 mg oral tablet 1 tab(s) orally once a day (at bedtime)  2.	clopidogrel 75 mg oral tablet 1 tab(s) orally once a day  3.	Cozaar 100 mg oral tablet 1 tab(s) orally once a day  4.	Eliquis 5 mg oral tablet 1 tab(s) orally 2 times a day  5.	HumaLOG 100 units/mL injectable solution 10 unit(s) injectable 3 times a day before meals  6.	Jardiance 25 mg oral tablet 1 tab(s) orally once a day (***stopped 1 year ago, will confirm with PCP tomorrow if should resume***)  7.	Lantus Solostar Pen 100 units/mL subcutaneous solution 30 unit(s) subcutaneous once a day (at bedtime)  8.	metFORMIN 1000 mg oral tablet 1 tab(s) orally 2 times a day  9.	Nicoderm C-Q Clear 14 mg/24 hr transdermal film, extended release 1 patch transdermally once a day  10.	Pacerone 200 mg oral tablet 1 tab(s) orally once a day	  Medications added to OMR (updated per discussion with patient):	  11.	Ozempic 2 mg/1.5 mL (0.25 mg or 0.5 mg dose) subcutaneous solution 0.5 milligram(s) subcutaneous once a week    12.	Claritin 10 mg oral tablet 1 tab(s) orally once a day as needed for allergy symptoms   	  Medications removed from OMR (updated per discussion with patient):	  1.	Ozempic 2 mg/1.5 mL (1 mg dose) subcutaneous solution 2 milligram(s) subcutaneous once a week   	  Medication name, indication, administration, side effect, and monitoring reviewed for new medications during post discharge counseling visit with patient. Patient demonstrated understanding. Counseling offered for all medications.	  	  Patient's LFTs were elevated prior to discharge (AST 80,  on 1/15/25). Patient has an appointment with his PCP tomorrow where he will likely get bloodwork to check his LFTs, and a cardiology follow-up appointment on 1/29. Made inpatient team aware. Patient also stated that he has not taken Jardiance in about a year (although he has it), since he thought that it was for his liver and that he does not need it. Patient will follow up with his PCP tomorrow regarding whether to resume this medication.     Patient was planned to have a revascularization 2-4 weeks from discharge. He will touch base with Dr. Jewell during his appointment on 1/29 to see if he needs to hold any of his medications prior to the procedure.    Patient reported sometimes adjusting his mealtime insulin to up to 16 units if blood glucose levels are elevated. He has a Freestyle Praveena and has had one hypoglycemic episode but this was on the 10 units of premeal insulin. Reviewed hypoglycemia management, which the patient was familiar with. Patient will continue to monitor his blood glucose closely and discuss with his outpatient provider if any changes need to be made to his insulin regimen. Patient also stated that he was off Ozempic for a while due to side effects of nausea/vomiting, but his provider recently restarted it at a lower dose.  	  Yajaira Salinas, PharmD	  Clinical Pharmacy Specialist, Pharmacy Telehealth Team	  Can be reached via MS Teams or [phone]

## 2025-01-24 RX ORDER — METFORMIN HYDROCHLORIDE 1000 MG/1
1000 TABLET, COATED ORAL TWICE DAILY
Refills: 0 | Status: ACTIVE | COMMUNITY

## 2025-01-24 RX ORDER — INSULIN GLARGINE 100 [IU]/ML
100 INJECTION, SOLUTION SUBCUTANEOUS AT BEDTIME
Refills: 0 | Status: ACTIVE | COMMUNITY

## 2025-01-24 RX ORDER — CLOPIDOGREL 75 MG/1
75 TABLET, FILM COATED ORAL DAILY
Refills: 0 | Status: ACTIVE | COMMUNITY

## 2025-01-24 RX ORDER — LOSARTAN POTASSIUM 100 MG/1
100 TABLET, FILM COATED ORAL DAILY
Refills: 0 | Status: ACTIVE | COMMUNITY

## 2025-01-24 RX ORDER — APIXABAN 5 MG/1
5 TABLET, FILM COATED ORAL TWICE DAILY
Refills: 0 | Status: ACTIVE | COMMUNITY

## 2025-01-24 RX ORDER — EMPAGLIFLOZIN 25 MG/1
25 TABLET, FILM COATED ORAL DAILY
Refills: 0 | Status: ACTIVE | COMMUNITY

## 2025-01-24 RX ORDER — AMIODARONE HYDROCHLORIDE 200 MG/1
200 TABLET ORAL DAILY
Refills: 0 | Status: ACTIVE | COMMUNITY

## 2025-01-24 RX ORDER — ATORVASTATIN CALCIUM 40 MG/1
40 TABLET, FILM COATED ORAL
Refills: 0 | Status: ACTIVE | COMMUNITY

## 2025-01-24 RX ORDER — INSULIN LISPRO 100 [IU]/ML
100 INJECTION, SOLUTION INTRAVENOUS; SUBCUTANEOUS 3 TIMES DAILY
Refills: 0 | Status: ACTIVE | COMMUNITY

## 2025-02-03 ENCOUNTER — APPOINTMENT (OUTPATIENT)
Dept: HEART FAILURE | Facility: CLINIC | Age: 45
End: 2025-02-03
Payer: MEDICAID

## 2025-02-03 VITALS
SYSTOLIC BLOOD PRESSURE: 152 MMHG | HEART RATE: 78 BPM | OXYGEN SATURATION: 97 % | HEIGHT: 68 IN | WEIGHT: 199 LBS | BODY MASS INDEX: 30.16 KG/M2 | DIASTOLIC BLOOD PRESSURE: 98 MMHG

## 2025-02-03 DIAGNOSIS — I51.3 INTRACARDIAC THROMBOSIS, NOT ELSEWHERE CLASSIFIED: ICD-10-CM

## 2025-02-03 DIAGNOSIS — I25.118 ATHEROSCLEROTIC HEART DISEASE OF NATIVE CORONARY ARTERY WITH OTHER FORMS OF ANGINA PECTORIS: ICD-10-CM

## 2025-02-03 DIAGNOSIS — I25.5 UNSPECIFIED SYSTOLIC (CONGESTIVE) HEART FAILURE: ICD-10-CM

## 2025-02-03 DIAGNOSIS — E78.5 HYPERLIPIDEMIA, UNSPECIFIED: ICD-10-CM

## 2025-02-03 DIAGNOSIS — I10 ESSENTIAL (PRIMARY) HYPERTENSION: ICD-10-CM

## 2025-02-03 DIAGNOSIS — I50.20 UNSPECIFIED SYSTOLIC (CONGESTIVE) HEART FAILURE: ICD-10-CM

## 2025-02-03 DIAGNOSIS — E11.59 TYPE 2 DIABETES MELLITUS WITH OTHER CIRCULATORY COMPLICATIONS: ICD-10-CM

## 2025-02-03 PROCEDURE — 93000 ELECTROCARDIOGRAM COMPLETE: CPT

## 2025-02-03 PROCEDURE — 99215 OFFICE O/P EST HI 40 MIN: CPT | Mod: 25

## 2025-02-03 PROCEDURE — 99407 BEHAV CHNG SMOKING > 10 MIN: CPT

## 2025-02-03 RX ORDER — SPIRONOLACTONE 25 MG/1
25 TABLET ORAL DAILY
Qty: 90 | Refills: 3 | Status: ACTIVE | COMMUNITY
Start: 2025-02-03 | End: 1900-01-01

## 2025-02-03 RX ORDER — EMPAGLIFLOZIN 10 MG/1
10 TABLET, FILM COATED ORAL DAILY
Qty: 90 | Refills: 3 | Status: ACTIVE | COMMUNITY
Start: 2025-02-03 | End: 1900-01-01

## 2025-02-03 RX ORDER — METOPROLOL SUCCINATE 50 MG/1
50 TABLET, EXTENDED RELEASE ORAL DAILY
Qty: 90 | Refills: 3 | Status: ACTIVE | COMMUNITY
Start: 2025-02-03 | End: 1900-01-01

## 2025-02-18 ENCOUNTER — LABORATORY RESULT (OUTPATIENT)
Age: 45
End: 2025-02-18

## 2025-03-03 ENCOUNTER — NON-APPOINTMENT (OUTPATIENT)
Age: 45
End: 2025-03-03

## 2025-03-03 ENCOUNTER — APPOINTMENT (OUTPATIENT)
Dept: HEART FAILURE | Facility: CLINIC | Age: 45
End: 2025-03-03
Payer: MEDICAID

## 2025-03-03 VITALS — DIASTOLIC BLOOD PRESSURE: 70 MMHG | SYSTOLIC BLOOD PRESSURE: 116 MMHG

## 2025-03-03 VITALS — HEART RATE: 85 BPM

## 2025-03-03 DIAGNOSIS — I50.20 UNSPECIFIED SYSTOLIC (CONGESTIVE) HEART FAILURE: ICD-10-CM

## 2025-03-03 DIAGNOSIS — I25.5 UNSPECIFIED SYSTOLIC (CONGESTIVE) HEART FAILURE: ICD-10-CM

## 2025-03-03 DIAGNOSIS — E78.5 HYPERLIPIDEMIA, UNSPECIFIED: ICD-10-CM

## 2025-03-03 DIAGNOSIS — I25.118 ATHEROSCLEROTIC HEART DISEASE OF NATIVE CORONARY ARTERY WITH OTHER FORMS OF ANGINA PECTORIS: ICD-10-CM

## 2025-03-03 PROCEDURE — 99215 OFFICE O/P EST HI 40 MIN: CPT | Mod: 25

## 2025-03-03 PROCEDURE — 93000 ELECTROCARDIOGRAM COMPLETE: CPT

## 2025-03-03 RX ORDER — EZETIMIBE 10 MG/1
10 TABLET ORAL DAILY
Qty: 30 | Refills: 6 | Status: ACTIVE | COMMUNITY
Start: 2025-03-03 | End: 1900-01-01

## 2025-03-11 LAB
ALBUMIN SERPL ELPH-MCNC: 4.7 G/DL
ALP BLD-CCNC: 180 U/L
ALT SERPL-CCNC: 68 U/L
ANION GAP SERPL CALC-SCNC: 17 MMOL/L
APTT BLD: 37.4 SEC
AST SERPL-CCNC: 35 U/L
BASOPHILS # BLD AUTO: 0.08 K/UL
BASOPHILS NFR BLD AUTO: 0.7 %
BILIRUB SERPL-MCNC: 0.2 MG/DL
BUN SERPL-MCNC: 23 MG/DL
CALCIUM SERPL-MCNC: 9.4 MG/DL
CHLORIDE SERPL-SCNC: 101 MMOL/L
CO2 SERPL-SCNC: 20 MMOL/L
CREAT SERPL-MCNC: 1 MG/DL
EGFRCR SERPLBLD CKD-EPI 2021: 95 ML/MIN/1.73M2
EOSINOPHIL # BLD AUTO: 0.2 K/UL
EOSINOPHIL NFR BLD AUTO: 1.7 %
GLUCOSE SERPL-MCNC: 165 MG/DL
HCT VFR BLD CALC: 40.1 %
HGB BLD-MCNC: 13.8 G/DL
IMM GRANULOCYTES NFR BLD AUTO: 0.5 %
INR PPP: 0.96 RATIO
LYMPHOCYTES # BLD AUTO: 3.24 K/UL
LYMPHOCYTES NFR BLD AUTO: 28.1 %
MAN DIFF?: NORMAL
MCHC RBC-ENTMCNC: 32.8 PG
MCHC RBC-ENTMCNC: 34.4 G/DL
MCV RBC AUTO: 95.2 FL
MONOCYTES # BLD AUTO: 0.62 K/UL
MONOCYTES NFR BLD AUTO: 5.4 %
NEUTROPHILS # BLD AUTO: 7.31 K/UL
NEUTROPHILS NFR BLD AUTO: 63.6 %
PLATELET # BLD AUTO: 351 K/UL
PMV BLD AUTO: 0 /100 WBCS
PMV BLD: 10.1 FL
POTASSIUM SERPL-SCNC: 4.4 MMOL/L
PROT SERPL-MCNC: 8.3 G/DL
PT BLD: 11.3 SEC
RBC # BLD: 4.21 M/UL
RBC # FLD: 14.7 %
SODIUM SERPL-SCNC: 138 MMOL/L
WBC # FLD AUTO: 11.51 K/UL

## 2025-03-12 VITALS
SYSTOLIC BLOOD PRESSURE: 112 MMHG | HEART RATE: 78 BPM | OXYGEN SATURATION: 100 % | RESPIRATION RATE: 18 BRPM | DIASTOLIC BLOOD PRESSURE: 67 MMHG

## 2025-03-12 NOTE — H&P CARDIOLOGY - SPO2 (%)
BIBEMS for head injury mechanical fall from standing position. pt states he was walking too long went to lean against the wall missed it , fell to the floor hitting back of head, skin intact. PMH  DM , HTN, Incontinent of stool. 100

## 2025-03-12 NOTE — H&P CARDIOLOGY - HISTORY OF PRESENT ILLNESS
44-year-old male past medical history hypertension, hyperlipidemia, diabetes, active smoker presents with dyspnea and chest pain.  Patient with recent COVID infection.  Patient found to have an NSTEMI and echo with severely reduced LV systolic function.  WMA in significant LAD territory. LHC/RHC 1/10/25 demonstrated severe stenosis of mid LAD, likely culprit and disease of OM1 but patient became hypotensive during procedure and patient had low filling pressures.  Patient presents today for [facility] for staged pCI      Pre cath note:  indication:  [ ] STEMI                [x ] NSTEMI                 [ ] Acute coronary syndrome                   [ ]Unstable Angina   [ ] high risk  [ ] intermediate risk  [ ] low risk                   [ ] Stable Angina     non-invasive testing:                          Date:                     result: [ ] high risk  [ ] intermediate risk  [ ] low risk    Anti- Anginal medications:                    [ ] not used d/t                     [ ] used   ( ) BB     ( ) CCB      ( ) Nitrate   (  ) Ranexa          [ ] not used but strong indication not to use    Ejection Fraction                   [ ] <29            [ ] 30-39%   [ ] 40-49%     [ ]>50%    CHF          [ ] active (within last 14 days on meds   [ ] Chronic (on meds but no exacerbation)  NYHA Functional Class:  (  ) Class I (no limitations)  (  ) Class II (slight limitation)  (  ) Class III (marked limitation)  (  ) Class IV (symptoms at rest)    COPD                   [ ] mild (on chronic bronchodilators)  [ ] moderate (on chronic steroid therapy)      [ ] severe (indication for home O2 or PACO2 >50)    Other risk factors:                     [ ] Previous MI                     [ ] CVA/ stroke                    [ ] carotid stent/ CEA                    [ ] PVD/PAD- (arterial aneurysm, non-palpable pulses, tortuous vessel with inability to insert catheter, infra-renal dissection, renal or subclavian artery stenosis)                    [ ] previous CABG                    [ ] Renal Failure:  on HD  (  ) yes  (  ) no                    [x ] Diabetic  (  ) Type 1  (x  ) Type 2                                         (x  ) Insulin dependent  (  ) non-insulin dependent                                         (x  ) Metformin  (  ) Januvia  (  ) Glimepiride  (  ) Glipizide  (  ) Glyburide  (  ) Actos                                         (  ) GLP-1 receptor agonists (Ozempic, Mounjaro, Wegovy, Zepbound, Trulicity, Byetta, Victoza)                                         (x  ) SGLT2 Inhibitors (Farxiga, Jardiance, Invokana)                                         (  ) Other                Bleeding Risk:     Pre-cath Hydration: (  )  cc IV bolus x 1 over 1 hr followed by:    (  ) NS @ 75cc/hr until procedure (up to 2 hrs) if EF> 50%                                                                                                                             (  ) NS @ 50cc/hr until procedure (up to 2 hrs) if EF< 50%                                        (  ) No precath hydration d/t      RIGHT RADIAL ARTERY EVALUATION:  GARRETT TEST: [] Negative          [] Positive    EF:   Date:    EKG:   Date: < from: TTE Echo Complete w/ Contrast w/ Doppler (01.14.25 @ 15:26) >    Summary:   1. Left ventricular ejection fraction, by visual estimation, is 35 to   40%.   2. Moderately decreased global left ventricular systolic function.   3. Multiple left ventricular regional wall motion abnormalitiesexist.   See wall motion findings.   4. Mildly increased LV wall thickness.   5. Spectral Doppler shows impaired relaxation pattern of left   ventricular myocardial filling (Grade I diastolic dysfunction).   6. Compared to the TTE on 12/27/24, there is significant improvement of   left ventricular wall motion and systolic function, near complete   resolution of the apical thrombus.   7. Normal left atrial size.   8. Normal right atrial size.   9. Trace mitral valve regurgitation.  10. There is noevidence of pericardial effusio       44-year-old male past medical history hypertension, hyperlipidemia, diabetes, active smoker presents with dyspnea and chest pain.  Patient with recent COVID infection.  Patient found to have an NSTEMI and echo with severely reduced LV systolic function.  WMA in significant LAD territory. LHC/RHC 1/10/25 demonstrated severe stenosis of mid LAD, likely culprit and disease of OM1 but patient became hypotensive during procedure and patient had low filling pressures. Pt on Eliquis and aspirin but took last dose on Wednesday  Patient presents today for [facility] for staged pCI      Pre cath note:  indication:  [ ] STEMI                [x ] NSTEMI                 [ ] Acute coronary syndrome                   [ ]Unstable Angina   [ ] high risk  [ ] intermediate risk  [ ] low risk                   [ ] Stable Angina     non-invasive testing:                          Date:                     result: [ ] high risk  [ ] intermediate risk  [ ] low risk    Anti- Anginal medications: Held d/t low blood pressure                   [ ] not used d/t                     [ ] used   ( ) BB     ( ) CCB      ( ) Nitrate   (  ) Ranexa          [ ] not used but strong indication not to use    Ejection Fraction                   [ ] <29            [ ] 30-39%   [ ] 40-49%     [ ]>50%    CHF          [ ] active (within last 14 days on meds   [ ] Chronic (on meds but no exacerbation)  NYHA Functional Class:  (  ) Class I (no limitations)  (  ) Class II (slight limitation)  (  ) Class III (marked limitation)  (  ) Class IV (symptoms at rest)    COPD                   [ ] mild (on chronic bronchodilators)  [ ] moderate (on chronic steroid therapy)      [ ] severe (indication for home O2 or PACO2 >50)    Other risk factors:                     [ ] Previous MI                     [ ] CVA/ stroke                    [ ] carotid stent/ CEA                    [ ] PVD/PAD- (arterial aneurysm, non-palpable pulses, tortuous vessel with inability to insert catheter, infra-renal dissection, renal or subclavian artery stenosis)                    [ ] previous CABG                    [ ] Renal Failure:  on HD  (  ) yes  (  ) no                    [x ] Diabetic  (  ) Type 1  (x  ) Type 2                                         (x  ) Insulin dependent  (  ) non-insulin dependent                                         (x  ) Metformin  (  ) Januvia  (  ) Glimepiride  (  ) Glipizide  (  ) Glyburide  (  ) Actos                                         (  ) GLP-1 receptor agonists (Ozempic, Mounjaro, Wegovy, Zepbound, Trulicity, Byetta, Victoza)                                         (x  ) SGLT2 Inhibitors (Farxiga, Jardiance, Invokana)                                         (  ) Other                Bleeding Risk:     Pre-cath Hydration: (  )  cc IV bolus x 1 over 1 hr followed by:    ( x ) NS @ 75cc/hr until procedure (up to 2 hrs) if EF> 50%                                                                                                                             (  ) NS @ 50cc/hr until procedure (up to 2 hrs) if EF< 50%                                        (  ) No precath hydration d/t      RIGHT RADIAL ARTERY EVALUATION:  GARRETT TEST: [] Negative          [x] Positive    EF: 35-40  Date:    EKG:   Date: < from: TTE Echo Complete w/ Contrast w/ Doppler (01.14.25 @ 15:26) >    Summary:   1. Left ventricular ejection fraction, by visual estimation, is 35 to   40%.   2. Moderately decreased global left ventricular systolic function.   3. Multiple left ventricular regional wall motion abnormalitiesexist.   See wall motion findings.   4. Mildly increased LV wall thickness.   5. Spectral Doppler shows impaired relaxation pattern of left   ventricular myocardial filling (Grade I diastolic dysfunction).   6. Compared to the TTE on 12/27/24, there is significant improvement of   left ventricular wall motion and systolic function, near complete   resolution of the apical thrombus.   7. Normal left atrial size.   8. Normal right atrial size.   9. Trace mitral valve regurgitation.  10. There is noevidence of pericardial effusio       44-year-old male past medical history hypertension, hyperlipidemia, diabetes, active smoker presents with dyspnea and chest pain.  Patient with recent COVID infection.  Patient found to have an NSTEMI and echo with severely reduced LV systolic function.  WMA in significant LAD territory. LHC/RHC 1/10/25 demonstrated severe stenosis of mid LAD, likely culprit and disease of OM1 but patient became hypotensive during procedure and patient had low filling pressures. Pt on Eliquis and aspirin but took last dose on Wednesday  Patient presents today for [facility] for staged pCI      Pre cath note:  indication:  [ ] STEMI                [x ] NSTEMI                 [ ] Acute coronary syndrome                   [ ]Unstable Angina   [ ] high risk  [ ] intermediate risk  [ ] low risk                   [ ] Stable Angina     non-invasive testing:                          Date:                     result: [ ] high risk  [ ] intermediate risk  [ ] low risk    Anti- Anginal medications: Held d/t low blood pressure                   [ ] not used d/t                     [ ] used   ( ) BB     ( ) CCB      ( ) Nitrate   (  ) Ranexa          [ ] not used but strong indication not to use    Ejection Fraction                   [ ] <29            [ ] 30-39%   [ ] 40-49%     [ ]>50%    CHF          [ ] active (within last 14 days on meds   [ ] Chronic (on meds but no exacerbation)  NYHA Functional Class:  (  ) Class I (no limitations)  (  ) Class II (slight limitation)  (  ) Class III (marked limitation)  (  ) Class IV (symptoms at rest)    COPD                   [ ] mild (on chronic bronchodilators)  [ ] moderate (on chronic steroid therapy)      [ ] severe (indication for home O2 or PACO2 >50)    Other risk factors:                     [ ] Previous MI                     [ ] CVA/ stroke                    [ ] carotid stent/ CEA                    [ ] PVD/PAD- (arterial aneurysm, non-palpable pulses, tortuous vessel with inability to insert catheter, infra-renal dissection, renal or subclavian artery stenosis)                    [ ] previous CABG                    [ ] Renal Failure:  on HD  (  ) yes  (  ) no                    [x ] Diabetic  (  ) Type 1  (x  ) Type 2                                         (x  ) Insulin dependent  (  ) non-insulin dependent                                         (x  ) Metformin  (  ) Januvia  (  ) Glimepiride  (  ) Glipizide  (  ) Glyburide  (  ) Actos                                         (  ) GLP-1 receptor agonists (Ozempic, Mounjaro, Wegovy, Zepbound, Trulicity, Byetta, Victoza)                                         (x  ) SGLT2 Inhibitors (Farxiga, Jardiance, Invokana)                                         (  ) Other                Bleeding Risk: 2.1%    Pre-cath Hydration: (  )  cc IV bolus x 1 over 1 hr followed by:    ( x ) NS @ 75cc/hr until procedure (up to 2 hrs) if EF> 50%                                                                                                                             (  ) NS @ 50cc/hr until procedure (up to 2 hrs) if EF< 50%                                        (  ) No precath hydration d/t      RIGHT RADIAL ARTERY EVALUATION:  GARRETT TEST: [] Negative          [x] Positive    EF: 35-40  Date:    EKG:   Date: < from: TTE Echo Complete w/ Contrast w/ Doppler (01.14.25 @ 15:26) >    Summary:   1. Left ventricular ejection fraction, by visual estimation, is 35 to   40%.   2. Moderately decreased global left ventricular systolic function.   3. Multiple left ventricular regional wall motion abnormalitiesexist.   See wall motion findings.   4. Mildly increased LV wall thickness.   5. Spectral Doppler shows impaired relaxation pattern of left   ventricular myocardial filling (Grade I diastolic dysfunction).   6. Compared to the TTE on 12/27/24, there is significant improvement of   left ventricular wall motion and systolic function, near complete   resolution of the apical thrombus.   7. Normal left atrial size.   8. Normal right atrial size.   9. Trace mitral valve regurgitation.  10. There is noevidence of pericardial effusio

## 2025-03-14 ENCOUNTER — OUTPATIENT (OUTPATIENT)
Dept: OUTPATIENT SERVICES | Facility: HOSPITAL | Age: 45
LOS: 1 days | Discharge: HOME | End: 2025-03-14
Payer: MEDICAID

## 2025-03-14 ENCOUNTER — TRANSCRIPTION ENCOUNTER (OUTPATIENT)
Age: 45
End: 2025-03-14

## 2025-03-14 VITALS
SYSTOLIC BLOOD PRESSURE: 125 MMHG | RESPIRATION RATE: 14 BRPM | HEART RATE: 80 BPM | OXYGEN SATURATION: 97 % | DIASTOLIC BLOOD PRESSURE: 75 MMHG

## 2025-03-14 DIAGNOSIS — I25.10 ATHEROSCLEROTIC HEART DISEASE OF NATIVE CORONARY ARTERY WITHOUT ANGINA PECTORIS: ICD-10-CM

## 2025-03-14 LAB
ANION GAP SERPL CALC-SCNC: 12 MMOL/L — SIGNIFICANT CHANGE UP (ref 7–14)
ANION GAP SERPL CALC-SCNC: 14 MMOL/L — SIGNIFICANT CHANGE UP (ref 7–14)
BASOPHILS # BLD AUTO: 0.04 K/UL — SIGNIFICANT CHANGE UP (ref 0–0.2)
BASOPHILS NFR BLD AUTO: 0.5 % — SIGNIFICANT CHANGE UP (ref 0–1)
BUN SERPL-MCNC: 23 MG/DL — HIGH (ref 10–20)
BUN SERPL-MCNC: 25 MG/DL — HIGH (ref 10–20)
CALCIUM SERPL-MCNC: 8.1 MG/DL — LOW (ref 8.4–10.5)
CALCIUM SERPL-MCNC: 9.2 MG/DL — SIGNIFICANT CHANGE UP (ref 8.4–10.5)
CHLORIDE SERPL-SCNC: 103 MMOL/L — SIGNIFICANT CHANGE UP (ref 98–110)
CHLORIDE SERPL-SCNC: 105 MMOL/L — SIGNIFICANT CHANGE UP (ref 98–110)
CO2 SERPL-SCNC: 19 MMOL/L — SIGNIFICANT CHANGE UP (ref 17–32)
CO2 SERPL-SCNC: 23 MMOL/L — SIGNIFICANT CHANGE UP (ref 17–32)
CREAT SERPL-MCNC: 0.9 MG/DL — SIGNIFICANT CHANGE UP (ref 0.7–1.5)
CREAT SERPL-MCNC: 1.2 MG/DL — SIGNIFICANT CHANGE UP (ref 0.7–1.5)
EGFR: 108 ML/MIN/1.73M2 — SIGNIFICANT CHANGE UP
EGFR: 108 ML/MIN/1.73M2 — SIGNIFICANT CHANGE UP
EGFR: 76 ML/MIN/1.73M2 — SIGNIFICANT CHANGE UP
EGFR: 76 ML/MIN/1.73M2 — SIGNIFICANT CHANGE UP
EOSINOPHIL # BLD AUTO: 0.23 K/UL — SIGNIFICANT CHANGE UP (ref 0–0.7)
EOSINOPHIL NFR BLD AUTO: 3 % — SIGNIFICANT CHANGE UP (ref 0–8)
GLUCOSE SERPL-MCNC: 150 MG/DL — HIGH (ref 70–99)
GLUCOSE SERPL-MCNC: 175 MG/DL — HIGH (ref 70–99)
HCT VFR BLD CALC: 35.9 % — LOW (ref 42–52)
HCT VFR BLD CALC: 38.4 % — LOW (ref 42–52)
HGB BLD-MCNC: 12.3 G/DL — LOW (ref 14–18)
HGB BLD-MCNC: 13 G/DL — LOW (ref 14–18)
IMM GRANULOCYTES NFR BLD AUTO: 0.5 % — HIGH (ref 0.1–0.3)
LYMPHOCYTES # BLD AUTO: 2.36 K/UL — SIGNIFICANT CHANGE UP (ref 1.2–3.4)
LYMPHOCYTES # BLD AUTO: 30.5 % — SIGNIFICANT CHANGE UP (ref 20.5–51.1)
MCHC RBC-ENTMCNC: 32.7 PG — HIGH (ref 27–31)
MCHC RBC-ENTMCNC: 33.1 PG — HIGH (ref 27–31)
MCHC RBC-ENTMCNC: 33.9 G/DL — SIGNIFICANT CHANGE UP (ref 32–37)
MCHC RBC-ENTMCNC: 34.3 G/DL — SIGNIFICANT CHANGE UP (ref 32–37)
MCV RBC AUTO: 96.5 FL — HIGH (ref 80–94)
MCV RBC AUTO: 96.5 FL — HIGH (ref 80–94)
MONOCYTES # BLD AUTO: 0.59 K/UL — SIGNIFICANT CHANGE UP (ref 0.1–0.6)
MONOCYTES NFR BLD AUTO: 7.6 % — SIGNIFICANT CHANGE UP (ref 1.7–9.3)
NEUTROPHILS # BLD AUTO: 4.47 K/UL — SIGNIFICANT CHANGE UP (ref 1.4–6.5)
NEUTROPHILS NFR BLD AUTO: 57.9 % — SIGNIFICANT CHANGE UP (ref 42.2–75.2)
NRBC BLD AUTO-RTO: 0 /100 WBCS — SIGNIFICANT CHANGE UP (ref 0–0)
NRBC BLD AUTO-RTO: 0 /100 WBCS — SIGNIFICANT CHANGE UP (ref 0–0)
PLATELET # BLD AUTO: 291 K/UL — SIGNIFICANT CHANGE UP (ref 130–400)
PLATELET # BLD AUTO: 311 K/UL — SIGNIFICANT CHANGE UP (ref 130–400)
PMV BLD: 9.6 FL — SIGNIFICANT CHANGE UP (ref 7.4–10.4)
PMV BLD: 9.8 FL — SIGNIFICANT CHANGE UP (ref 7.4–10.4)
POTASSIUM SERPL-MCNC: 3.6 MMOL/L — SIGNIFICANT CHANGE UP (ref 3.5–5)
POTASSIUM SERPL-MCNC: 3.9 MMOL/L — SIGNIFICANT CHANGE UP (ref 3.5–5)
POTASSIUM SERPL-SCNC: 3.6 MMOL/L — SIGNIFICANT CHANGE UP (ref 3.5–5)
POTASSIUM SERPL-SCNC: 3.9 MMOL/L — SIGNIFICANT CHANGE UP (ref 3.5–5)
RBC # BLD: 3.72 M/UL — LOW (ref 4.7–6.1)
RBC # BLD: 3.98 M/UL — LOW (ref 4.7–6.1)
RBC # FLD: 14.6 % — HIGH (ref 11.5–14.5)
RBC # FLD: 14.8 % — HIGH (ref 11.5–14.5)
SODIUM SERPL-SCNC: 136 MMOL/L — SIGNIFICANT CHANGE UP (ref 135–146)
SODIUM SERPL-SCNC: 140 MMOL/L — SIGNIFICANT CHANGE UP (ref 135–146)
WBC # BLD: 6.71 K/UL — SIGNIFICANT CHANGE UP (ref 4.8–10.8)
WBC # BLD: 7.73 K/UL — SIGNIFICANT CHANGE UP (ref 4.8–10.8)
WBC # FLD AUTO: 6.71 K/UL — SIGNIFICANT CHANGE UP (ref 4.8–10.8)
WBC # FLD AUTO: 7.73 K/UL — SIGNIFICANT CHANGE UP (ref 4.8–10.8)

## 2025-03-14 PROCEDURE — 85025 COMPLETE CBC W/AUTO DIFF WBC: CPT

## 2025-03-14 PROCEDURE — 82962 GLUCOSE BLOOD TEST: CPT

## 2025-03-14 PROCEDURE — C1753: CPT

## 2025-03-14 PROCEDURE — 92928 PRQ TCAT PLMT NTRAC ST 1 LES: CPT | Mod: LC

## 2025-03-14 PROCEDURE — C1887: CPT

## 2025-03-14 PROCEDURE — 92978 ENDOLUMINL IVUS OCT C 1ST: CPT | Mod: LC

## 2025-03-14 PROCEDURE — 85027 COMPLETE CBC AUTOMATED: CPT

## 2025-03-14 PROCEDURE — 92978 ENDOLUMINL IVUS OCT C 1ST: CPT | Mod: 26,LC

## 2025-03-14 PROCEDURE — C1874: CPT

## 2025-03-14 PROCEDURE — C1894: CPT

## 2025-03-14 PROCEDURE — C9600: CPT | Mod: LC

## 2025-03-14 PROCEDURE — C1725: CPT

## 2025-03-14 PROCEDURE — 36415 COLL VENOUS BLD VENIPUNCTURE: CPT

## 2025-03-14 PROCEDURE — C1769: CPT

## 2025-03-14 PROCEDURE — 80048 BASIC METABOLIC PNL TOTAL CA: CPT

## 2025-03-14 RX ORDER — DIPHENHYDRAMINE HCL 12.5MG/5ML
50 ELIXIR ORAL ONCE
Refills: 0 | Status: DISCONTINUED | OUTPATIENT
Start: 2025-03-14 | End: 2025-03-14

## 2025-03-14 RX ORDER — ASPIRIN 325 MG
325 TABLET ORAL ONCE
Refills: 0 | Status: COMPLETED | OUTPATIENT
Start: 2025-03-14 | End: 2025-03-14

## 2025-03-14 RX ORDER — METOPROLOL SUCCINATE 50 MG/1
1 TABLET, EXTENDED RELEASE ORAL
Refills: 0 | DISCHARGE

## 2025-03-14 RX ORDER — CLOPIDOGREL BISULFATE 75 MG/1
75 TABLET, FILM COATED ORAL ONCE
Refills: 0 | Status: COMPLETED | OUTPATIENT
Start: 2025-03-14 | End: 2025-03-14

## 2025-03-14 RX ORDER — APIXABAN 2.5 MG/1
1 TABLET, FILM COATED ORAL
Qty: 60 | Refills: 2
Start: 2025-03-14 | End: 2025-06-11

## 2025-03-14 RX ORDER — HYDROCORTISONE 20 MG
200 TABLET ORAL ONCE
Refills: 0 | Status: COMPLETED | OUTPATIENT
Start: 2025-03-14 | End: 2025-03-14

## 2025-03-14 RX ADMIN — Medication 100 MILLILITER(S): at 08:15

## 2025-03-14 RX ADMIN — CLOPIDOGREL BISULFATE 75 MILLIGRAM(S): 75 TABLET, FILM COATED ORAL at 08:32

## 2025-03-14 RX ADMIN — Medication 200 MILLIGRAM(S): at 08:33

## 2025-03-14 RX ADMIN — Medication 150 MILLILITER(S): at 10:00

## 2025-03-14 RX ADMIN — Medication 325 MILLIGRAM(S): at 08:32

## 2025-03-14 RX ADMIN — Medication 75 MILLILITER(S): at 08:33

## 2025-03-14 NOTE — ASU DISCHARGE PLAN (ADULT/PEDIATRIC) - FINANCIAL ASSISTANCE
[facility] provides services at a reduced cost to those who are determined to be eligible through [facility]’s financial assistance program. Information regarding [facility]’s financial assistance program can be found by going to [email or web address] or by calling [phone].

## 2025-03-14 NOTE — CHART NOTE - NSCHARTNOTEFT_GEN_A_CORE
PRE-OP DIAGNOSIS:  Staged PCI to Lcx      PROCEDURE:     [X] Coronary Angiogram     [] LHC     [] LVG     [] RHC     [X] Intervention (see below)         PHYSICIAN:  Dr. Jewell    ASSISTANT:  Dr. Del Cid       PROCEDURE DESCRIPTION:     Consent:      [X] Patient     [] Family Member     []  Used        Anesthesia:     [X] General     [] Sedation     [X] Local        Access & Closure:     [X] 6Fr R Radial Artery     [] Fr Femoral Artery     [] Fr Femoral Vein     [] Fr Brachial Vein       IV Contrast: 150 mL        Intervention:   ISABELA PCI to OM1  IVUS    Implants:    Synergy 2.75 x 38mm to OM1 (AUC 7)    FINDINGS:     Coronary Dominance: Right dominate      LM: No significant disease    LAD: 99% stenosis Mid LAD, severely diseased distal LAD    CX: 80% diffused lesion OM1 s/p PCI    RCA: Large dominate vessel. 40% lesion RPL    ESTIMATED BLOOD LOSS: < 10 mL        CONDITION:     [X] Good     [] Fair     [] Critical        SPECIMEN REMOVED: N/A       POST-OP DIAGNOSIS:      [] Normal Coronary Angiogram     [] Mild Coronary Artery Disease (< 50% stenosis)     [X] 2 Vessel Coronary Artery Disease LAD, Lcx. S/P PCI of Lcx       PLAN OF CARE:     [X] D/C Home Today     [] Return to In-patient bed     [] Admit for observation     [] Return for Staged Procedure     [] CT Surgery Consult     [X] Medications: GDMT for HFrEF, DAPT, statin    [X] IV Fluids: NS 75cc/hr x 6hrs PRE-OP DIAGNOSIS:  Staged PCI to Lcx      PROCEDURE:     [X] Coronary Angiogram     [] LHC     [] LVG     [] RHC     [X] Intervention (see below)         PHYSICIAN:  Dr. Jewell    ASSISTANT:  Dr. Del Cid       PROCEDURE DESCRIPTION:     Consent:      [X] Patient     [] Family Member     []  Used        Anesthesia:     [X] General     [] Sedation     [X] Local        Access & Closure:     [X] 6Fr R Radial Artery     [] Fr Femoral Artery     [] Fr Femoral Vein     [] Fr Brachial Vein       IV Contrast: 150 mL        Intervention:   ISABELA PCI to OM1  IVUS    Implants:    Synergy 2.75 x 38mm to OM1 (AUC 7)    FINDINGS:     Coronary Dominance: Right dominate      LM: No significant disease    LAD: 90% stenosis Mid LAD, severely diseased distal LAD    CX: 80% diffused lesion OM1 s/p PCI    RCA: Large dominate vessel. 60% distal RCA    ESTIMATED BLOOD LOSS: < 10 mL        CONDITION:     [X] Good     [] Fair     [] Critical        SPECIMEN REMOVED: N/A       POST-OP DIAGNOSIS:      [] Normal Coronary Angiogram     [] Mild Coronary Artery Disease (< 50% stenosis)     [X] 2 Vessel Coronary Artery Disease LAD, Lcx. S/P PCI of Lcx       PLAN OF CARE:     [X] D/C Home Today     [] Return to In-patient bed     [] Admit for observation     [] Return for Staged Procedure     [] CT Surgery Consult     [X] Medications: GDMT for HFrEF, Eliquis and Plavix, statin    [X] IV Fluids: NS 75cc/hr x 6hrs

## 2025-03-14 NOTE — ASU PATIENT PROFILE, ADULT - PACKS PER DAY
0.5 Partial Purse String (Intermediate) Text: Given the location of the defect and the characteristics of the surrounding skin an intermediate purse string closure was deemed most appropriate.  Undermining was performed circumferentially around the surgical defect.  A purse string suture was then placed and tightened. Wound tension only allowed a partial closure of the circular defect.

## 2025-03-14 NOTE — ASU PATIENT PROFILE, ADULT - NSICDXPASTMEDICALHX_GEN_ALL_CORE_FT
PAST MEDICAL HISTORY:  DM (diabetes mellitus)     Hepatitis B     HTN (hypertension)     Myocardial infarct

## 2025-03-14 NOTE — ASU DISCHARGE PLAN (ADULT/PEDIATRIC) - CARE PROVIDER_API CALL
Eduardo Jewell  Interventional Cardiology  [address] [place] [address], [address]  [place] [address]  Phone: [phone]  Fax: [phone]  Follow Up Time: 2 weeks

## 2025-03-14 NOTE — PROGRESS NOTE ADULT - SUBJECTIVE AND OBJECTIVE BOX
Cardiology Follow up s/p PCI    SARAH FARMER   44y Male  PAST MEDICAL & SURGICAL HISTORY:  HTN (hypertension)  Hepatitis B  DM (diabetes mellitus)  Myocardial infarct           HPI:  44-year-old male past medical history hypertension, hyperlipidemia, diabetes, active smoker presents with dyspnea and chest pain.  Patient with recent COVID infection.  Patient found to have an NSTEMI and echo with severely reduced LV systolic function.  WMA in significant LAD territory. LHC/RHC 1/10/25 demonstrated severe stenosis of mid LAD, likely culprit and disease of OM1 but patient became hypotensive during procedure and patient had low filling pressures. Pt on Eliquis and aspirin but took last dose on Wednesday  Patient presents today for [facility] for staged pCI      Pre cath note:  indication:  [ ] STEMI                [x ] NSTEMI                 [ ] Acute coronary syndrome                   [ ]Unstable Angina   [ ] high risk  [ ] intermediate risk  [ ] low risk                   [ ] Stable Angina     non-invasive testing:                          Date:                     result: [ ] high risk  [ ] intermediate risk  [ ] low risk    Anti- Anginal medications: Held d/t low blood pressure                   [ ] not used d/t                     [ ] used   ( ) BB     ( ) CCB      ( ) Nitrate   (  ) Ranexa          [ ] not used but strong indication not to use    Ejection Fraction                   [ ] <29            [ ] 30-39%   [ ] 40-49%     [ ]>50%    CHF          [ ] active (within last 14 days on meds   [ ] Chronic (on meds but no exacerbation)  NYHA Functional Class:  (  ) Class I (no limitations)  (  ) Class II (slight limitation)  (  ) Class III (marked limitation)  (  ) Class IV (symptoms at rest)    COPD                   [ ] mild (on chronic bronchodilators)  [ ] moderate (on chronic steroid therapy)      [ ] severe (indication for home O2 or PACO2 >50)    Other risk factors:                     [ ] Previous MI                     [ ] CVA/ stroke                    [ ] carotid stent/ CEA                    [ ] PVD/PAD- (arterial aneurysm, non-palpable pulses, tortuous vessel with inability to insert catheter, infra-renal dissection, renal or subclavian artery stenosis)                    [ ] previous CABG                    [ ] Renal Failure:  on HD  (  ) yes  (  ) no                    [x ] Diabetic  (  ) Type 1  (x  ) Type 2                                         (x  ) Insulin dependent  (  ) non-insulin dependent                                         (x  ) Metformin  (  ) Januvia  (  ) Glimepiride  (  ) Glipizide  (  ) Glyburide  (  ) Actos                                         (  ) GLP-1 receptor agonists (Ozempic, Mounjaro, Wegovy, Zepbound, Trulicity, Byetta, Victoza)                                         (x  ) SGLT2 Inhibitors (Farxiga, Jardiance, Invokana)                                         (  ) Other                Bleeding Risk: 2.1%    Pre-cath Hydration: (  )  cc IV bolus x 1 over 1 hr followed by:    ( x ) NS @ 75cc/hr until procedure (up to 2 hrs) if EF> 50%                                                                                                                             (  ) NS @ 50cc/hr until procedure (up to 2 hrs) if EF< 50%                                        (  ) No precath hydration d/t      RIGHT RADIAL ARTERY EVALUATION:  GARRETT TEST: [] Negative          [x] Positive    EF: 35-40  Date:    EKG:   Date: < from: TTE Echo Complete w/ Contrast w/ Doppler (01.14.25 @ 15:26) >    Summary:   1. Left ventricular ejection fraction, by visual estimation, is 35 to   40%.   2. Moderately decreased global left ventricular systolic function.   3. Multiple left ventricular regional wall motion abnormalitiesexist.   See wall motion findings.   4. Mildly increased LV wall thickness.   5. Spectral Doppler shows impaired relaxation pattern of left   ventricular myocardial filling (Grade I diastolic dysfunction).   6. Compared to the TTE on 12/27/24, there is significant improvement of   left ventricular wall motion and systolic function, near complete   resolution of the apical thrombus.   7. Normal left atrial size.   8. Normal right atrial size.   9. Trace mitral valve regurgitation.  10. There is noevidence of pericardial effusio       (12 Mar 2025 12:05)    Allergies    shellfish (Urticaria; Rash; Short breath)  No Known Drug Allergies  Seafood (Urticaria)    Intolerances      Patient seen and examined at bedside.   Patient without complaints.   Denies CP, SOB, palpitations, or dizziness  No events on telemetry     Vital Signs Last 24 Hrs  T(C): 36.7 (14 Mar 2025 08:13), Max: 36.7 (14 Mar 2025 08:13)  T(F): 98.1 (14 Mar 2025 08:13), Max: 98.1 (14 Mar 2025 08:13)  HR: 68   BP: 120/88   BP(mean): --  RR: 15   SpO2: 98% room air        MEDICATIONS  (STANDING):  chlorhexidine 4% Liquid 1 Application(s) Topical once  diphenhydrAMINE Injectable 50 milliGRAM(s) IV Push once  sodium chloride 0.9%. 1000 milliLiter(s) (150 mL/Hr) IV Continuous <Continuous>  sodium chloride 0.9%. 1000 milliLiter(s) (100 mL/Hr) IV Continuous <Continuous>  sodium chloride 0.9%. 1000 milliLiter(s) (75 mL/Hr) IV Continuous <Continuous>    MEDICATIONS  (PRN):      REVIEW OF SYSTEMS:          All negative except as mentioned in HPI    PHYSICAL EXAM:           CONSTITUTIONAL: Well-developed; well-nourished; in no acute distress  	SKIN: warm, dry  	HEAD: Normocephalic; atraumatic  	EYES: PERRL.  	ENT: No nasal discharge, airway clear, mucous membranes moist  	NECK: Supple; non tender.  	CARD: +S1, +S2, no murmurs, gallops, or rubs. Regular rate and rhythm    	RESP: No wheezes, rales or rhonchi. CTA B/L  	ABD: soft ntnd, + BS x 4 quadrants  	EXT: moves all extremities,  no clubbing, cyanosis or edema  	NEURO: Alert and oriented x3, no focal deficits          PSYCH: Cooperative, appropriate          VASCULAR:  + Rad / + PTs / +  DPs          EXTREMITY:              	   Right Radial: Dressing D/C/I, dstat, access site soft, no hematoma, no pain, + pulses, no sign of infection, no numbness            post PCI BMP:  post pCI EKG:  post pCI CBC:                                                                                                                2D ECHO:    LABS:                        13.0   7.73  )-----------( 311      ( 14 Mar 2025 08:00 )             38.4     03-14    140  |  103  |  25[H]  ----------------------------<  175[H]  3.9   |  23  |  1.2    Ca    9.2      14 Mar 2025 08:00              A/P:  I discussed the case with Cardiologist Dr. Jewell  and recommend the following:    S/P PCI:    Intervention:   ISABELA PCI to OM1  IVUS    Implants:    Synergy 2.75 x 38mm to OM1 (AUC 7)    FINDINGS:     Coronary Dominance: Right dominate    LM: No significant disease    LAD: 99% stenosis Mid LAD, severely diseased distal LAD    CX: 80% diffused lesion OM1 s/p PCI    RCA: Large dominate vessel. 40% lesion RPL                     CBC/ECG at 1345                   NS  _150 cc/hr x _2 hr  	    Continue DAPT ( Aspirin 81 mg PO Daily and   Plavix 75mg PO daily),  Cozaar, Amio, Statin Therapy, PPI, BB                     Patient to be on TRiple therapy: ASA 81mg pO daily, Plavix 75mg PO daily, and Eliquis 5mg pO for today only                    Stop ASA on discharge.   Continue Plavix and Eliquis only due to increased risk of bleeding                                  Patient given 30 day supply of ( Aspirin 81 mg daily and Plavix 75 mg daily ) to take at home                   Patient agreeing to take DAPT for at least one year or as directed by cardiologist                    Pt given instructions on importance of taking antiplatelet medication or risk acute stent thrombosis/death                   Post cath instructions, access site care and activity restrictions reviewed with patient                     Discussed with patient to return to hospital if experience chest pain, shortness breath, dizziness and site bleeding                   Aggressive risk factor modification, diet counseling, smoking cessation discussed with patient                       Can discharge patient from cardiac standpoint at 1545  after ambulating without symptoms and access site wnl, ECG and blood work reviewed   Cardiac rehab information provided/ referral and communication for cardiac rehab completed                   Benefits of Cardiac Rehab discussed with patient,  Patient instructed to call and make first                               appointment after first f/u visit with Cardiologist                    Follow up with Cardiology Dr. Jewell    in  two weeks.  Instructed to call and make an appointment                      Discharge instructions as follows, when ready to d/c:                   - Continue medical regimen as prescribed to prevent chest pain                   - Continue anti-platelet therapy, statin, Amio, Cozaar, BB, PPI                    - If you are diabetic and taking medication containing Metformin, do not take them for 48 hours after the procedure                   - Instructed to call 911 if chest pain, shortness of breath or bleeding from access site                   - No heavy lifting >10lbs x 1 week                   - No driving x 24 hours                   - No baths, swimming pools x 1 week, may shower                   - Low sodium low fat low cholesterol diet                   - Follow-up with Cardiologist in 2 weeks after discharge                                        Cardiology Follow up s/p PCI    SARAH FARMER   44y Male  PAST MEDICAL & SURGICAL HISTORY:  HTN (hypertension)  Hepatitis B  DM (diabetes mellitus)  Myocardial infarct           HPI:  44-year-old male past medical history hypertension, hyperlipidemia, diabetes, active smoker presents with dyspnea and chest pain.  Patient with recent COVID infection.  Patient found to have an NSTEMI and echo with severely reduced LV systolic function.  WMA in significant LAD territory. LHC/RHC 1/10/25 demonstrated severe stenosis of mid LAD, likely culprit and disease of OM1 but patient became hypotensive during procedure and patient had low filling pressures. Pt on Eliquis and aspirin but took last dose on Wednesday  Patient presents today for [facility] for staged pCI      Pre cath note:  indication:  [ ] STEMI                [x ] NSTEMI                 [ ] Acute coronary syndrome                   [ ]Unstable Angina   [ ] high risk  [ ] intermediate risk  [ ] low risk                   [ ] Stable Angina     non-invasive testing:                          Date:                     result: [ ] high risk  [ ] intermediate risk  [ ] low risk    Anti- Anginal medications: Held d/t low blood pressure                   [ ] not used d/t                     [ ] used   ( ) BB     ( ) CCB      ( ) Nitrate   (  ) Ranexa          [ ] not used but strong indication not to use    Ejection Fraction                   [ ] <29            [ ] 30-39%   [ ] 40-49%     [ ]>50%    CHF          [ ] active (within last 14 days on meds   [ ] Chronic (on meds but no exacerbation)  NYHA Functional Class:  (  ) Class I (no limitations)  (  ) Class II (slight limitation)  (  ) Class III (marked limitation)  (  ) Class IV (symptoms at rest)    COPD                   [ ] mild (on chronic bronchodilators)  [ ] moderate (on chronic steroid therapy)      [ ] severe (indication for home O2 or PACO2 >50)    Other risk factors:                     [ ] Previous MI                     [ ] CVA/ stroke                    [ ] carotid stent/ CEA                    [ ] PVD/PAD- (arterial aneurysm, non-palpable pulses, tortuous vessel with inability to insert catheter, infra-renal dissection, renal or subclavian artery stenosis)                    [ ] previous CABG                    [ ] Renal Failure:  on HD  (  ) yes  (  ) no                    [x ] Diabetic  (  ) Type 1  (x  ) Type 2                                         (x  ) Insulin dependent  (  ) non-insulin dependent                                         (x  ) Metformin  (  ) Januvia  (  ) Glimepiride  (  ) Glipizide  (  ) Glyburide  (  ) Actos                                         (  ) GLP-1 receptor agonists (Ozempic, Mounjaro, Wegovy, Zepbound, Trulicity, Byetta, Victoza)                                         (x  ) SGLT2 Inhibitors (Farxiga, Jardiance, Invokana)                                         (  ) Other                Bleeding Risk: 2.1%    Pre-cath Hydration: (  )  cc IV bolus x 1 over 1 hr followed by:    ( x ) NS @ 75cc/hr until procedure (up to 2 hrs) if EF> 50%                                                                                                                             (  ) NS @ 50cc/hr until procedure (up to 2 hrs) if EF< 50%                                        (  ) No precath hydration d/t      RIGHT RADIAL ARTERY EVALUATION:  GARRETT TEST: [] Negative          [x] Positive    EF: 35-40  Date:    EKG:   Date: < from: TTE Echo Complete w/ Contrast w/ Doppler (01.14.25 @ 15:26) >    Summary:   1. Left ventricular ejection fraction, by visual estimation, is 35 to   40%.   2. Moderately decreased global left ventricular systolic function.   3. Multiple left ventricular regional wall motion abnormalitiesexist.   See wall motion findings.   4. Mildly increased LV wall thickness.   5. Spectral Doppler shows impaired relaxation pattern of left   ventricular myocardial filling (Grade I diastolic dysfunction).   6. Compared to the TTE on 12/27/24, there is significant improvement of   left ventricular wall motion and systolic function, near complete   resolution of the apical thrombus.   7. Normal left atrial size.   8. Normal right atrial size.   9. Trace mitral valve regurgitation.  10. There is noevidence of pericardial effusio       (12 Mar 2025 12:05)    Allergies    shellfish (Urticaria; Rash; Short breath)  No Known Drug Allergies  Seafood (Urticaria)    Intolerances      Patient seen and examined at bedside.   Patient without complaints.   Denies CP, SOB, palpitations, or dizziness  No events on telemetry     Vital Signs Last 24 Hrs  T(C): 36.7 (14 Mar 2025 08:13), Max: 36.7 (14 Mar 2025 08:13)  T(F): 98.1 (14 Mar 2025 08:13), Max: 98.1 (14 Mar 2025 08:13)  HR: 68   BP: 120/88   BP(mean): --  RR: 15   SpO2: 98% room air        MEDICATIONS  (STANDING):  chlorhexidine 4% Liquid 1 Application(s) Topical once  diphenhydrAMINE Injectable 50 milliGRAM(s) IV Push once  sodium chloride 0.9%. 1000 milliLiter(s) (150 mL/Hr) IV Continuous <Continuous>  sodium chloride 0.9%. 1000 milliLiter(s) (100 mL/Hr) IV Continuous <Continuous>  sodium chloride 0.9%. 1000 milliLiter(s) (75 mL/Hr) IV Continuous <Continuous>    MEDICATIONS  (PRN):      REVIEW OF SYSTEMS:          All negative except as mentioned in HPI    PHYSICAL EXAM:           CONSTITUTIONAL: Well-developed; well-nourished; in no acute distress  	SKIN: warm, dry  	HEAD: Normocephalic; atraumatic  	EYES: PERRL.  	ENT: No nasal discharge, airway clear, mucous membranes moist  	NECK: Supple; non tender.  	CARD: +S1, +S2, no murmurs, gallops, or rubs. Regular rate and rhythm    	RESP: No wheezes, rales or rhonchi. CTA B/L  	ABD: soft ntnd, + BS x 4 quadrants  	EXT: moves all extremities,  no clubbing, cyanosis or edema  	NEURO: Alert and oriented x3, no focal deficits          PSYCH: Cooperative, appropriate          VASCULAR:  + Rad / + PTs / +  DPs          EXTREMITY:              	   Right Radial: Dressing D/C/I, dstat, access site soft, no hematoma, no pain, + pulses, no sign of infection, no numbness            post PCI BMP: 0.9 (pre 1.2)  post pCI EKG:SR  post pCI CBC: 12.3 (pre 13)                                                                                                                2D ECHO:    LABS:                        13.0   7.73  )-----------( 311      ( 14 Mar 2025 08:00 )             38.4     03-14    140  |  103  |  25[H]  ----------------------------<  175[H]  3.9   |  23  |  1.2    Ca    9.2      14 Mar 2025 08:00              A/P:  I discussed the case with Cardiologist Dr. Jewell  and recommend the following:    S/P PCI:    Intervention:   ISABELA PCI to OM1  IVUS    Implants:    Synergy 2.75 x 38mm to OM1 (AUC 7)    FINDINGS:     Coronary Dominance: Right dominate    LM: No significant disease    LAD: 99% stenosis Mid LAD, severely diseased distal LAD    CX: 80% diffused lesion OM1 s/p PCI    RCA: Large dominate vessel. 40% lesion RPL                     CBC/ECG at 1345                   NS  _150 cc/hr x _2 hr  	    Continue DAPT ( Aspirin 81 mg PO Daily and   Plavix 75mg PO daily),  Cozaar, Amio, Statin Therapy, PPI, BB                     Patient to be on TRiple therapy: ASA 81mg pO daily, Plavix 75mg PO daily, and Eliquis 5mg pO for today only                    Stop ASA on discharge.   Continue Plavix and Eliquis only due to increased risk of bleeding                                  Patient given 30 day supply of ( Aspirin 81 mg daily and Plavix 75 mg daily ) to take at home                   Patient agreeing to take DAPT for at least one year or as directed by cardiologist                    Pt given instructions on importance of taking antiplatelet medication or risk acute stent thrombosis/death                   Post cath instructions, access site care and activity restrictions reviewed with patient                     Discussed with patient to return to hospital if experience chest pain, shortness breath, dizziness and site bleeding                   Aggressive risk factor modification, diet counseling, smoking cessation discussed with patient                       Can discharge patient from cardiac standpoint at 1545  after ambulating without symptoms and access site wnl, ECG and blood work reviewed   Cardiac rehab information provided/ referral and communication for cardiac rehab completed                   Benefits of Cardiac Rehab discussed with patient,  Patient instructed to call and make first                               appointment after first f/u visit with Cardiologist                    Follow up with Cardiology Dr. Jewell    in  two weeks.  Instructed to call and make an appointment                      Discharge instructions as follows, when ready to d/c:                   - Continue medical regimen as prescribed to prevent chest pain                   - Continue anti-platelet therapy, statin, Amio, Cozaar, BB, PPI                    - If you are diabetic and taking medication containing Metformin, do not take them for 48 hours after the procedure                   - Instructed to call 911 if chest pain, shortness of breath or bleeding from access site                   - No heavy lifting >10lbs x 1 week                   - No driving x 24 hours                   - No baths, swimming pools x 1 week, may shower                   - Low sodium low fat low cholesterol diet                   - Follow-up with Cardiologist in 2 weeks after discharge

## 2025-03-14 NOTE — ASU DISCHARGE PLAN (ADULT/PEDIATRIC) - NS MD DC FALL RISK RISK
For information on Fall & Injury Prevention, visit: [email or web address]  [email or web address] OR  https://www.cdc.gov/steadi/patient.html

## 2025-03-17 DIAGNOSIS — I25.118 ATHEROSCLEROTIC HEART DISEASE OF NATIVE CORONARY ARTERY WITH OTHER FORMS OF ANGINA PECTORIS: ICD-10-CM

## 2025-04-14 ENCOUNTER — APPOINTMENT (OUTPATIENT)
Dept: HEART FAILURE | Facility: CLINIC | Age: 45
End: 2025-04-14
Payer: MEDICAID

## 2025-04-14 ENCOUNTER — APPOINTMENT (OUTPATIENT)
Dept: CARDIOLOGY | Facility: CLINIC | Age: 45
End: 2025-04-14
Payer: MEDICAID

## 2025-04-14 ENCOUNTER — NON-APPOINTMENT (OUTPATIENT)
Age: 45
End: 2025-04-14

## 2025-04-14 VITALS
BODY MASS INDEX: 32.58 KG/M2 | DIASTOLIC BLOOD PRESSURE: 90 MMHG | HEART RATE: 59 BPM | SYSTOLIC BLOOD PRESSURE: 146 MMHG | WEIGHT: 215 LBS | OXYGEN SATURATION: 97 % | HEIGHT: 68 IN

## 2025-04-14 DIAGNOSIS — I25.5 UNSPECIFIED SYSTOLIC (CONGESTIVE) HEART FAILURE: ICD-10-CM

## 2025-04-14 DIAGNOSIS — I51.3 INTRACARDIAC THROMBOSIS, NOT ELSEWHERE CLASSIFIED: ICD-10-CM

## 2025-04-14 DIAGNOSIS — I50.20 UNSPECIFIED SYSTOLIC (CONGESTIVE) HEART FAILURE: ICD-10-CM

## 2025-04-14 DIAGNOSIS — E78.5 HYPERLIPIDEMIA, UNSPECIFIED: ICD-10-CM

## 2025-04-14 DIAGNOSIS — I10 ESSENTIAL (PRIMARY) HYPERTENSION: ICD-10-CM

## 2025-04-14 DIAGNOSIS — I25.118 ATHEROSCLEROTIC HEART DISEASE OF NATIVE CORONARY ARTERY WITH OTHER FORMS OF ANGINA PECTORIS: ICD-10-CM

## 2025-04-14 DIAGNOSIS — E11.59 TYPE 2 DIABETES MELLITUS WITH OTHER CIRCULATORY COMPLICATIONS: ICD-10-CM

## 2025-04-14 PROCEDURE — 93000 ELECTROCARDIOGRAM COMPLETE: CPT

## 2025-04-14 PROCEDURE — 99215 OFFICE O/P EST HI 40 MIN: CPT | Mod: 25

## 2025-04-14 PROCEDURE — 93306 TTE W/DOPPLER COMPLETE: CPT

## 2025-04-14 RX ORDER — METOPROLOL SUCCINATE 100 MG/1
100 TABLET, EXTENDED RELEASE ORAL
Refills: 0 | Status: ACTIVE | COMMUNITY
Start: 2025-02-03

## 2025-04-14 RX ORDER — EMPAGLIFLOZIN 25 MG/1
25 TABLET, FILM COATED ORAL DAILY
Qty: 90 | Refills: 3 | Status: ACTIVE | COMMUNITY
Start: 2025-04-14 | End: 1900-01-01